# Patient Record
Sex: FEMALE | Race: WHITE | NOT HISPANIC OR LATINO | Employment: OTHER | URBAN - METROPOLITAN AREA
[De-identification: names, ages, dates, MRNs, and addresses within clinical notes are randomized per-mention and may not be internally consistent; named-entity substitution may affect disease eponyms.]

---

## 2017-01-12 DIAGNOSIS — F41.1 GAD (GENERALIZED ANXIETY DISORDER): Primary | ICD-10-CM

## 2017-01-12 NOTE — TELEPHONE ENCOUNTER
Xanax 1mg      Last Written Prescription Date:  07/22/16  Last Fill Quantity: 20,   # refills: 5  Last Office Visit with [facility] prescribing provider: 11/02/16  Future Office visit:       Routing refill request to provider for review/approval because:  Drug not on the [facility] refill protocol or controlled substance    Thank you -  Jairo Magallanes, Pharmacy Technician  [facility]  On Behalf Of [facility]

## 2017-01-13 RX ORDER — ALPRAZOLAM 1 MG
1 TABLET ORAL PRN
Qty: 20 TABLET | Refills: 5 | Status: SHIPPED | OUTPATIENT
Start: 2017-01-13 | End: 2017-06-29

## 2017-06-29 DIAGNOSIS — F41.1 GAD (GENERALIZED ANXIETY DISORDER): ICD-10-CM

## 2017-06-30 RX ORDER — ALPRAZOLAM 1 MG
1 TABLET ORAL PRN
Qty: 20 TABLET | Refills: 5 | Status: SHIPPED | OUTPATIENT
Start: 2017-06-30 | End: 2017-11-21

## 2017-06-30 NOTE — TELEPHONE ENCOUNTER
Script walked over to the [facility].    Christianne Kline, [facility]

## 2017-07-21 ENCOUNTER — OFFICE VISIT (OUTPATIENT)
Dept: FAMILY MEDICINE | Facility: CLINIC | Age: 47
End: 2017-07-21
Payer: COMMERCIAL

## 2017-07-21 VITALS
WEIGHT: 159.6 LBS | HEART RATE: 89 BPM | HEIGHT: 64 IN | SYSTOLIC BLOOD PRESSURE: 155 MMHG | DIASTOLIC BLOOD PRESSURE: 95 MMHG | TEMPERATURE: 98.6 F | BODY MASS INDEX: 27.25 KG/M2

## 2017-07-21 DIAGNOSIS — D50.0 IRON DEFICIENCY ANEMIA DUE TO CHRONIC BLOOD LOSS: ICD-10-CM

## 2017-07-21 DIAGNOSIS — F41.1 GENERALIZED ANXIETY DISORDER: Primary | ICD-10-CM

## 2017-07-21 DIAGNOSIS — R03.0 ELEVATED BLOOD PRESSURE READING WITHOUT DIAGNOSIS OF HYPERTENSION: ICD-10-CM

## 2017-07-21 DIAGNOSIS — L68.0 HIRSUTISM: ICD-10-CM

## 2017-07-21 LAB — HGB BLD-MCNC: 13.3 G/DL (ref 11.7–15.7)

## 2017-07-21 PROCEDURE — 85018 HEMOGLOBIN: CPT | Performed by: FAMILY MEDICINE

## 2017-07-21 PROCEDURE — 84443 ASSAY THYROID STIM HORMONE: CPT | Performed by: FAMILY MEDICINE

## 2017-07-21 PROCEDURE — 84403 ASSAY OF TOTAL TESTOSTERONE: CPT | Performed by: FAMILY MEDICINE

## 2017-07-21 PROCEDURE — 83498 ASY HYDROXYPROGESTERONE 17-D: CPT | Performed by: FAMILY MEDICINE

## 2017-07-21 PROCEDURE — 99214 OFFICE O/P EST MOD 30 MIN: CPT | Performed by: FAMILY MEDICINE

## 2017-07-21 PROCEDURE — 83001 ASSAY OF GONADOTROPIN (FSH): CPT | Performed by: FAMILY MEDICINE

## 2017-07-21 PROCEDURE — 36415 COLL VENOUS BLD VENIPUNCTURE: CPT | Performed by: FAMILY MEDICINE

## 2017-07-21 PROCEDURE — 82627 DEHYDROEPIANDROSTERONE: CPT | Performed by: FAMILY MEDICINE

## 2017-07-21 RX ORDER — DESVENLAFAXINE 25 MG/1
25 TABLET, EXTENDED RELEASE ORAL DAILY
Qty: 30 TABLET | Refills: 1 | Status: SHIPPED | OUTPATIENT
Start: 2017-07-21 | End: 2017-09-19

## 2017-07-21 ASSESSMENT — ANXIETY QUESTIONNAIRES
7. FEELING AFRAID AS IF SOMETHING AWFUL MIGHT HAPPEN: SEVERAL DAYS
1. FEELING NERVOUS, ANXIOUS, OR ON EDGE: SEVERAL DAYS
6. BECOMING EASILY ANNOYED OR IRRITABLE: SEVERAL DAYS
5. BEING SO RESTLESS THAT IT IS HARD TO SIT STILL: NOT AT ALL
3. WORRYING TOO MUCH ABOUT DIFFERENT THINGS: SEVERAL DAYS
GAD7 TOTAL SCORE: 6
2. NOT BEING ABLE TO STOP OR CONTROL WORRYING: SEVERAL DAYS

## 2017-07-21 ASSESSMENT — PATIENT HEALTH QUESTIONNAIRE - PHQ9: 5. POOR APPETITE OR OVEREATING: SEVERAL DAYS

## 2017-07-21 NOTE — NURSING NOTE
"Initial BP (!) 156/95  Pulse 97  Temp 98.6  F (37  C) (Tympanic)  Ht 5' 4.25\" (1.632 m)  Wt 159 lb 9.6 oz (72.4 kg)  Breastfeeding? No  BMI 27.18 kg/m2 Estimated body mass index is 27.18 kg/(m^2) as calculated from the following:    Height as of this encounter: 5' 4.25\" (1.632 m).    Weight as of this encounter: 159 lb 9.6 oz (72.4 kg). .      "

## 2017-07-21 NOTE — PROGRESS NOTES
SUBJECTIVE:                                                    Tracee Cooper is a 47 year old female who presents to clinic today for the following health issues:        Depression and Anxiety Follow-Up    Status since last visit: Worsened for the last 6 months, stopped effexor 1 year ago due to elevated BP    Other associated symptoms:None    Complicating factors:     Significant life event: No     Current substance abuse: None    Dizzy Spells    Lately occurs for a few minutes.  Comes out of the blue.  No syncope but will see spots. Not when stressed    Working out fine without issues.     1. Generalized anxiety disorder    2. Elevated blood pressure reading without diagnosis of hypertension - was better off of venlafaxine...she thought. No symptoms.    3. Iron deficiency anemia due to chronic blood loss - thinks she is better but never rechecked.    4. Hirsutism - ongoing. Frustrating.  Has had hormone testing in past.  Gets electrolysis regularlyy and therapist doesn't think this is normal.         PHQ-9 SCORE 8/21/2014 2/3/2015 7/21/2017   Total Score 2 2 -   Total Score MyChart 2 - -   Total Score - - 4     ORVILLE-7 SCORE 2/3/2015 3/1/2016 7/21/2017   Total Score 3 - -   Total Score - - -   Total Score - 4 6       PHQ-9  English  PHQ-9   Any Language  GAD7        Problem list and histories reviewed & adjusted, as indicated.  Additional history: as documented        Reviewed and updated as needed this visit by clinical staffTobacco  Allergies  Meds  Med Hx  Surg Hx  Fam Hx  Soc Hx      Reviewed and updated as needed this visit by Provider         1. Generalized anxiety disorder    2. Elevated blood pressure reading without diagnosis of hypertension    3. Iron deficiency anemia due to chronic blood loss    4. Hirsutism        PMH: Updated and/or reviewed in chart.    PSH: Updated and/or reviewed in chart.    Family History: Updated and/or reviewed in chart.     ROS:  Constitutional, neuro, endocrine,  "gastrointestinal, genitourinary and psychiatric systems are otherwise negative.       OBJECTIVE:                                                    BP (!) 156/95  Pulse 97  Temp 98.6  F (37  C) (Tympanic)  Ht 5' 4.25\" (1.632 m)  Wt 159 lb 9.6 oz (72.4 kg)  Breastfeeding? No  BMI 27.18 kg/m2  GENERAL: Pleasant and interactive.  Alert and oriented x 3.  No acute distress.  PSYCH: Alert and oriented times 3; coherent speech, normal rate and volume, able to articulate logical thoughts, able to abstract reason, no tangential thoughts, no hallucinations or delusions  Her affect is normal.      Results for orders placed or performed during the hospital encounter of 11/03/16   Hemoglobin   Result Value Ref Range    Hemoglobin 11.2 (L) 11.7 - 15.7 g/dL      Lab Results   Component Value Date    TSH 2.20 03/01/2016          ASSESSMENT/PLAN:                                                        ICD-10-CM    1. Generalized anxiety disorder F41.1 desvenlafaxine succinate (PRISTIQ) 25 MG 24 hr tablet   2. Elevated blood pressure reading without diagnosis of hypertension R03.0 TSH   3. Iron deficiency anemia due to chronic blood loss D50.0 Hemoglobin   4. Hirsutism L68.0 DHEA sulfate     Testosterone, total     TSH     Follicle stimulating hormone     17 OH progesterone       Care plan updated in chart for chronic problems.    Patient Instructions     Follow-up with me in 3-4 weeks after starting the Pristiq.    Labs today.    Consider oral contraceptive for hair growth if all labs normal but may not help and could be associated with slight increase cancer and clotting issues.      No orders of the defined types were placed in this encounter.         See Patient Instructions    Mukesh Regalado MD        "

## 2017-07-21 NOTE — MR AVS SNAPSHOT
After Visit Summary   7/21/2017    Tracee Cooper    MRN: [id number]           Patient Information     Date Of Birth          1970        Visit Information        Provider Department      7/21/2017 1:30 PM Mukesh Regalado MD [facility]        Today's Diagnoses     Generalized anxiety disorder    -  1    Elevated blood pressure reading without diagnosis of hypertension        Iron deficiency anemia due to chronic blood loss        Hirsutism          Care Instructions      Follow-up with me in 3-4 weeks after starting the Pristiq.    Labs today.    Consider oral contraceptive for hair growth if all labs normal but may not help and could be associated with slight increase cancer and clotting issues.           Follow-ups after your visit        Who to contact     Normal or non-critical lab and imaging results will be communicated to you by [employer], letter or phone within 4 business days after the clinic has received the results. If you do not hear from us within 7 days, please contact the clinic through [employer] or phone. If you have a critical or abnormal lab result, we will notify you by phone as soon as possible.  Submit refill requests through [employer] or call your pharmacy and they will forward the refill request to us. Please allow 3 business days for your refill to be completed.          If you need to speak with a  for additional information , please call: [phone]           Additional Information About Your Visit        [employer] Information     [employer] gives you secure access to your electronic health record. If you see a primary care provider, you can also send messages to your care team and make appointments. If you have questions, please call your primary care clinic.  If you do not have a primary care provider, please call [phone] and they will assist you.        Care EveryWhere ID     This is your Care EveryWhere ID. This could be used by  "other organizations to access your [place] medical records  [id number]        Your Vitals Were     Pulse Temperature Height Breastfeeding? BMI (Body Mass Index)       97 98.6  F (37  C) (Tympanic) 5' 4.25\" (1.632 m) No 27.18 kg/m2        Blood Pressure from Last 3 Encounters:   07/21/17 (!) 156/95   11/03/16 128/87   11/02/16 124/80    Weight from Last 3 Encounters:   07/21/17 159 lb 9.6 oz (72.4 kg)   11/03/16 160 lb (72.6 kg)   11/02/16 162 lb (73.5 kg)              We Performed the Following     17 OH progesterone     DHEA sulfate     Follicle stimulating hormone     Hemoglobin     Testosterone, total     TSH          Today's Medication Changes          These changes are accurate as of: 7/21/17  2:10 PM.  If you have any questions, ask your nurse or doctor.               Start taking these medicines.        Dose/Directions    desvenlafaxine succinate 25 MG 24 hr tablet   Commonly known as:  PRISTIQ   Used for:  Generalized anxiety disorder   Started by:  Mukesh Regalado MD        Dose:  25 mg   Take 1 tablet (25 mg) by mouth daily   Quantity:  30 tablet   Refills:  1            Where to get your medicines      These medications were sent to [facility] [address]  [address] [address]     Phone:  [phone]     desvenlafaxine succinate 25 MG 24 hr tablet                Primary Care Provider Office Phone # Fax #    Mukesh Regalado [phone]       [facility] [address] [facility] [address]        Equal Access to Services     [facility] AH: Hadii aad ku hadasho Soomaali, waaxda luqadaha, qaybta kaalmada adeegyada, waxay renitain hayaan moustapha steward. So [facility] [phone].    ATENCIÓN: Si habla español, tiene a sexton disposición servicios gratuitos de asistencia lingüística. Llame al [phone].    We comply with applicable federal civil rights laws and [place] laws. We do not discriminate on the " basis of race, color, national origin, age, disability sex, sexual orientation or gender identity.            Thank you!     Thank you for choosing [facility]  for your care. Our goal is always to provide you with excellent care. Hearing back from our patients is one way we can continue to improve our services. Please take a few minutes to complete the written survey that you may receive in the mail after your visit with us. Thank you!             Your Updated Medication List - Protect others around you: Learn how to safely use, store and throw away your medicines at www.disposemymeds.org.          This list is accurate as of: 7/21/17  2:10 PM.  Always use your most recent med list.                   Brand Name Dispense Instructions for use Diagnosis    ALPRAZolam 1 MG tablet    XANAX    20 tablet    Take 1 tablet (1 mg) by mouth as needed    ORVILLE (generalized anxiety disorder)       desvenlafaxine succinate 25 MG 24 hr tablet    PRISTIQ    30 tablet    Take 1 tablet (25 mg) by mouth daily    Generalized anxiety disorder

## 2017-07-21 NOTE — PATIENT INSTRUCTIONS
Follow-up with me in 3-4 weeks after starting the Pristiq.    Labs today.    Consider oral contraceptive for hair growth if all labs normal but may not help and could be associated with slight increase cancer and clotting issues.

## 2017-07-22 LAB
FSH SERPL-ACNC: 21.9 IU/L
TSH SERPL DL<=0.05 MIU/L-ACNC: 1.55 MU/L (ref 0.4–4)

## 2017-07-22 ASSESSMENT — ANXIETY QUESTIONNAIRES: GAD7 TOTAL SCORE: 6

## 2017-07-22 ASSESSMENT — PATIENT HEALTH QUESTIONNAIRE - PHQ9: SUM OF ALL RESPONSES TO PHQ QUESTIONS 1-9: 4

## 2017-07-24 LAB — DHEA-S SERPL-MCNC: 281 UG/DL (ref 35–430)

## 2017-07-25 LAB
17OHP SERPL-MCNC: 115 NG/DL
TESTOST SERPL-MCNC: 33 NG/DL (ref 8–60)

## 2017-07-31 NOTE — PROGRESS NOTES
MsRosi Cooper,    All of your results look great.  I can't come up with any specific medical causes for the hair growth. We may have to blame your parents?    Please contact the clinic if you have additional questions.  Thank you.    Sincerely,    Juve Regalado MD

## 2017-09-21 DIAGNOSIS — F41.1 GENERALIZED ANXIETY DISORDER: ICD-10-CM

## 2017-09-21 NOTE — TELEPHONE ENCOUNTER
MD sent RX for venlafaxine, but patient usually gets EFFEXOR (GERARDO 1) ..  Please okay new updated RX (to update EPIC) and pharmacy will override previous RX.

## 2017-11-21 ENCOUNTER — MYC MEDICAL ADVICE (OUTPATIENT)
Dept: FAMILY MEDICINE | Facility: CLINIC | Age: 47
End: 2017-11-21

## 2017-11-21 DIAGNOSIS — F41.1 GAD (GENERALIZED ANXIETY DISORDER): ICD-10-CM

## 2017-11-21 DIAGNOSIS — F41.1 GENERALIZED ANXIETY DISORDER: ICD-10-CM

## 2017-11-21 NOTE — TELEPHONE ENCOUNTER
Dr. Regalado:    Please see pt message below.  Refills pended.    PHQ-9 SCORE 8/21/2014 2/3/2015 7/21/2017   Total Score 2 2 -   Total Score MyChart 2 - -   Total Score - - 4     Sent new PHQ9 to pt via [employer].    Genevieve RIOS RN

## 2017-11-22 RX ORDER — ALPRAZOLAM 1 MG
1 TABLET ORAL PRN
Qty: 20 TABLET | Refills: 5 | Status: SHIPPED | OUTPATIENT
Start: 2017-11-22 | End: 2018-10-30

## 2017-11-22 NOTE — TELEPHONE ENCOUNTER
Script walked to [facility]. Patient notified via mychart.    Autumn [place]   [facility] [place]

## 2017-12-26 DIAGNOSIS — F41.1 GENERALIZED ANXIETY DISORDER: ICD-10-CM

## 2017-12-26 NOTE — TELEPHONE ENCOUNTER
Please send approval if filling a generic is okay, it will save patient a significant amount of money.       Venlafaxine      Last office visit:  07/21/17

## 2018-05-22 DIAGNOSIS — F41.1 GENERALIZED ANXIETY DISORDER: ICD-10-CM

## 2018-05-22 NOTE — TELEPHONE ENCOUNTER
"Requested Prescriptions   Pending Prescriptions Disp Refills     EFFEXOR XR 75 MG 24 hr capsule 90 capsule 1    Last Written Prescription Date:  01/09/2018 #90 x 1  Last filled 04/24/2018  Last office visit: 7/21/2017 OMAR Regalado   Future Office Visit: None    Note: Rx GERARDO, Requested GEQ     Sig: Take 1 capsule (75 mg) by mouth daily    Serotonin-Norepinephrine Reuptake Inhibitors  Failed    5/22/2018 10:58 AM       Failed - Blood pressure under 140/90 in past 12 months    BP Readings from Last 3 Encounters:   07/21/17 (!) 155/95   11/03/16 128/87   11/02/16 124/80                Failed - Normal serum creatinine on file in past 12 months    Recent Labs   Lab Test  06/29/16   1347   CR  0.67            Passed - Recent (12 mo) or future (30 days) visit within the authorizing provider's specialty    Patient had office visit in the last 12 months or has a visit in the next 30 days with authorizing provider or within the authorizing provider's specialty.  See \"Patient Info\" tab in inbasket, or \"Choose Columns\" in Meds & Orders section of the refill encounter.           Passed - Patient is age 18 or older       Passed - No active pregnancy on record       Passed - No positive pregnancy test in past 12 months          "

## 2018-05-23 RX ORDER — VENLAFAXINE HYDROCHLORIDE 75 MG/1
75 TABLET, EXTENDED RELEASE ORAL DAILY
Qty: 30 TABLET | Refills: 0 | Status: SHIPPED | OUTPATIENT
Start: 2018-05-23 | End: 2018-10-30

## 2018-05-23 NOTE — TELEPHONE ENCOUNTER
Routing refill request to provider for review/approval because:  Failed protocol also needs to get established with new provider. Candace Bernstein RN

## 2018-10-30 ENCOUNTER — OFFICE VISIT (OUTPATIENT)
Dept: FAMILY MEDICINE | Facility: CLINIC | Age: 48
End: 2018-10-30
Payer: COMMERCIAL

## 2018-10-30 VITALS
SYSTOLIC BLOOD PRESSURE: 160 MMHG | WEIGHT: 167.8 LBS | HEART RATE: 100 BPM | BODY MASS INDEX: 27.96 KG/M2 | TEMPERATURE: 98.5 F | DIASTOLIC BLOOD PRESSURE: 100 MMHG | HEIGHT: 65 IN

## 2018-10-30 DIAGNOSIS — F41.1 GENERALIZED ANXIETY DISORDER: ICD-10-CM

## 2018-10-30 DIAGNOSIS — I10 ESSENTIAL HYPERTENSION WITH GOAL BLOOD PRESSURE LESS THAN 140/90: Primary | ICD-10-CM

## 2018-10-30 DIAGNOSIS — Z12.31 ENCOUNTER FOR SCREENING MAMMOGRAM FOR BREAST CANCER: ICD-10-CM

## 2018-10-30 LAB
ANION GAP SERPL CALCULATED.3IONS-SCNC: 6 MMOL/L (ref 3–14)
BUN SERPL-MCNC: 11 MG/DL (ref 7–30)
CALCIUM SERPL-MCNC: 8.8 MG/DL (ref 8.5–10.1)
CHLORIDE SERPL-SCNC: 104 MMOL/L (ref 94–109)
CO2 SERPL-SCNC: 26 MMOL/L (ref 20–32)
CREAT SERPL-MCNC: 0.67 MG/DL (ref 0.52–1.04)
GFR SERPL CREATININE-BSD FRML MDRD: >90 ML/MIN/1.7M2
GLUCOSE SERPL-MCNC: 89 MG/DL (ref 70–99)
POTASSIUM SERPL-SCNC: 4.3 MMOL/L (ref 3.4–5.3)
SODIUM SERPL-SCNC: 136 MMOL/L (ref 133–144)

## 2018-10-30 PROCEDURE — 80048 BASIC METABOLIC PNL TOTAL CA: CPT | Performed by: FAMILY MEDICINE

## 2018-10-30 PROCEDURE — 99214 OFFICE O/P EST MOD 30 MIN: CPT | Performed by: FAMILY MEDICINE

## 2018-10-30 PROCEDURE — 36415 COLL VENOUS BLD VENIPUNCTURE: CPT | Performed by: FAMILY MEDICINE

## 2018-10-30 RX ORDER — ALPRAZOLAM 1 MG
1 TABLET ORAL 3 TIMES DAILY PRN
Qty: 20 TABLET | Refills: 0 | Status: SHIPPED | OUTPATIENT
Start: 2018-10-30 | End: 2019-01-03

## 2018-10-30 RX ORDER — HYDROCHLOROTHIAZIDE 12.5 MG/1
12.5 TABLET ORAL DAILY
Qty: 30 TABLET | Refills: 0 | Status: SHIPPED | OUTPATIENT
Start: 2018-10-30 | End: 2018-11-14 | Stop reason: DRUGHIGH

## 2018-10-30 RX ORDER — VENLAFAXINE HYDROCHLORIDE 75 MG/1
75 TABLET, EXTENDED RELEASE ORAL DAILY
Qty: 90 TABLET | Refills: 1 | Status: SHIPPED | OUTPATIENT
Start: 2018-10-30 | End: 2018-11-26

## 2018-10-30 ASSESSMENT — ANXIETY QUESTIONNAIRES
3. WORRYING TOO MUCH ABOUT DIFFERENT THINGS: SEVERAL DAYS
2. NOT BEING ABLE TO STOP OR CONTROL WORRYING: SEVERAL DAYS
5. BEING SO RESTLESS THAT IT IS HARD TO SIT STILL: NOT AT ALL
1. FEELING NERVOUS, ANXIOUS, OR ON EDGE: MORE THAN HALF THE DAYS
7. FEELING AFRAID AS IF SOMETHING AWFUL MIGHT HAPPEN: NOT AT ALL
6. BECOMING EASILY ANNOYED OR IRRITABLE: NOT AT ALL
GAD7 TOTAL SCORE: 5

## 2018-10-30 ASSESSMENT — PATIENT HEALTH QUESTIONNAIRE - PHQ9
5. POOR APPETITE OR OVEREATING: SEVERAL DAYS
SUM OF ALL RESPONSES TO PHQ QUESTIONS 1-9: 4

## 2018-10-30 NOTE — NURSING NOTE
"Initial BP (!) 160/100  Pulse 100  Temp 98.5  F (36.9  C) (Tympanic)  Ht 5' 4.75\" (1.645 m)  Wt 167 lb 12.8 oz (76.1 kg)  Breastfeeding? No  BMI 28.14 kg/m2 Estimated body mass index is 28.14 kg/(m^2) as calculated from the following:    Height as of this encounter: 5' 4.75\" (1.645 m).    Weight as of this encounter: 167 lb 12.8 oz (76.1 kg). .      "

## 2018-10-30 NOTE — PATIENT INSTRUCTIONS
I'll let you know the lab results from today when available.  We'll have you begin the hydrochlorothiazide for your blood pressure. This will be taken once daily in the morning.  We'll see you back for the blood pressure check and nonfasting blood work as scheduled    I refilled the venlafaxine for 6 months, we'll plan to update your questionnaires at that time.    I also refilled the alprazolam for 20 tablets.  Just let me know when you are due for a refill.    You can call [phone] to schedule your mammogram when you are ready.

## 2018-10-30 NOTE — PROGRESS NOTES
SUBJECTIVE:   Tracee Cooper is a 48 year old female who presents to clinic today for the following health issues:    Anxiety Follow-Up    Status since last visit: Worsened for the last 6 months    Other associated symptoms:None    Complicating factors:   Significant life event: Yes-  Work, family stress   Current substance abuse: None  Depression symptoms: Yes-    ORVILLE-7 SCORE 3/1/2016 7/21/2017 10/30/2018   Total Score - - -   Total Score - - -   Total Score 4 6 5       ORVILLE-7    Amount of exercise or physical activity: None    Problems taking medications regularly: No    Medication side effects: none    Diet: regular (no restrictions)    * bilateral ear pain, feels like there is fluid in ears    History of Present Illness:   Tracee has a long-standing history of generalized anxiety, managed with venlafaxine, alprazolam and psychotherapy in the past. She is here for medication refill and a 6 month history of worsening anxiety.    She has a long family history of anxiety. Recently, her daughter moved away for college. She feels a bit down about aging. Her anxiety makes it harder to accomplish daily tasks than it would be otherwise. She thinks she is handling it all well and that the medications allow her to cope.    She states that she would like to exercise more. She says exercise is 10/10 important to her because she knows it would help her health and anxiety. She says she is about 5/10 confident that she could begin exercising right away. The only obstacle she says is the motivation. Together, we set a goal of going to the gym 1 time per week to get started. She will use a girlfriend of hers for accountability.    She is concerned that she will develop hypertension again that will require anti-hypertensives. When she was off off of venlafaxine several years ago, her blood pressure was normal. She says staying on venlafaxine is important to her because she tried several other anxiety meds in the past and  it was the only one that worked well for her.    She uses alprazolam sparingly, perhaps a few times per month for increased anxiety.  Has now been out of that med for a number of weeks.    Problem list and histories reviewed & adjusted, as indicated.  Additional history: none    Patient Active Problem List   Diagnosis     Melanoma (H)     Anemia     Hirsutism     CARDIOVASCULAR SCREENING; LDL GOAL LESS THAN 160     Menorrhagia     Generalized anxiety disorder     Elevated blood pressure reading without diagnosis of hypertension     Chronic diarrhea     Excessive bleeding in premenopausal period     Past Surgical History:   Procedure Laterality Date     C/SECTION, LOW TRANSVERSE      , Low Transverse     D & C  3/8/06    Missed AB at 7 weeks     DILATION AND CURETTAGE, HYSTEROSCOPY, ABLATE ENDOMETRIUM, COMBINED N/A 11/3/2016    Procedure: COMBINED DILATION AND CURETTAGE, HYSTEROSCOPY, ABLATE ENDOMETRIUM;  Surgeon: Jane Marquez MD;  Location: [facility]     HYSTEROSCOPY  2006    F/U US Abnormalit s/p d&c     SURGICAL HISTORY OF -   2002    Malignant melanoma of the right arm.       Social History   Substance Use Topics     Smoking status: Former Smoker     Types: Cigarettes     Quit date: 2006     Smokeless tobacco: Never Used     Alcohol use Yes      Comment: occasionally     Family History   Problem Relation Age of Onset     Prostate Cancer Father      Thyroid Disease Mother      Hypothyroid     Hypertension Mother      C.A.D. Maternal Grandfather      Cerebrovascular Disease Maternal Grandmother      Breast Cancer Maternal Aunt      Diabetes Paternal Grandmother      adult onset     Cancer Paternal Grandfather      stomach     Cancer - colorectal No family hx of          Current Outpatient Prescriptions   Medication Sig Dispense Refill     ALPRAZolam (XANAX) 1 MG tablet Take 1 tablet (1 mg) by mouth 3 times daily as needed for anxiety 20 tablet 0     hydrochlorothiazide 12.5 MG  "TABS tablet Take 1 tablet (12.5 mg) by mouth daily 30 tablet 0     venlafaxine (EFFEXOR-ER) 75 MG TB24 24 hr tablet Take 1 tablet (75 mg) by mouth daily 90 tablet 1     No Known Allergies  Labs reviewed in EPIC    Reviewed and updated as needed this visit by clinical staff  Tobacco  Allergies  Meds  Med Hx  Surg Hx  Fam Hx  Soc Hx      Reviewed and updated as needed this visit by Provider  Tobacco  Med Hx  Surg Hx  Fam Hx  Soc Hx      ROS:  Constitutional, HEENT, cardiovascular, pulmonary, gi and gu systems are negative, except as otherwise noted.  Diarrhea 4x per week; intermittent bloating and cramping resolved by defecation    OBJECTIVE:     BP (!) 160/100  Pulse 100  Temp 98.5  F (36.9  C) (Tympanic)  Ht 5' 4.75\" (1.645 m)  Wt 167 lb 12.8 oz (76.1 kg)  Breastfeeding? No  BMI 28.14 kg/m2  Body mass index is 28.14 kg/(m^2).  GENERAL: healthy, alert and no distress  RESP: lungs clear to auscultation - no rales, rhonchi or wheezes  CV: regular rate and rhythm, normal S1 S2, no S3 or S4, no murmur, click or rub, no peripheral edema and peripheral pulses strong  MS: no gross musculoskeletal defects noted, no edema  Psych: Alert and oriented times 3; coherent speech, normal   rate and volume, able to articulate logical thoughts, able   to abstract reason, no tangential thoughts, no hallucinations   or delusions  Her affect is mildly anxious      Diagnostic Test Results:  none     ASSESSMENT/PLAN:     Tracee has hypertension, possibly related to venlafaxine or superimposed on essential hypertension.  Counseling was provided on the risks of alprazolam and to keep to less than 20 tablets every few months. Will check on use at next visit. Would also like to begin psychotherapy, but not open at this time    Reviewed options to switch to serotonin specific reuptake inhibitor but pt reports having failed all those meds in the past and is not interested in making a change.      ICD-10-CM    1. Essential " hypertension with goal blood pressure less than 140/90 I10 hydrochlorothiazide 12.5 MG TABS tablet     Basic metabolic panel     Basic metabolic panel   2. Generalized anxiety disorder F41.1 venlafaxine (EFFEXOR-ER) 75 MG TB24 24 hr tablet   3. Encounter for screening mammogram for breast cancer Z12.31 MA Screening Digital Bilateral       # Anxiety  - Continue venlafaxine ER 75mg PO q day  - Continue alprazolam 1 mg PRN  - Encouraged exercise    # Hypertension  - begin hydrochlorothiazide 12.5mg PO q day  - recheck 2 weeks    # Health maintenance  - Influenza vaccine declined this visit  - Ordered routine mammography, will schedule when able    Patient Instructions   I'll let you know the lab results from today when available.  We'll have you begin the hydrochlorothiazide for your blood pressure. This will be taken once daily in the morning.  We'll see you back for the blood pressure check and nonfasting blood work as scheduled    I refilled the venlafaxine for 6 months, we'll plan to update your questionnaires at that time.    I also refilled the alprazolam for 20 tablets.  Just let me know when you are due for a refill.    You can call [phone] to schedule your mammogram when you are ready.        Louise Agrawal, DO  [facility]

## 2018-10-30 NOTE — MR AVS SNAPSHOT
After Visit Summary   10/30/2018    Tracee Cooper    MRN: [id number]           Patient Information     Date Of Birth          1970        Visit Information        Provider Department      10/30/2018 11:40 AM Louise Agrawal DO [facility]        Today's Diagnoses     Essential hypertension with goal blood pressure less than 140/90    -  1    ORVILLE (generalized anxiety disorder)        Generalized anxiety disorder        Encounter for screening mammogram for breast cancer          Care Instructions    I'll let you know the lab results from today when available.  We'll have you begin the hydrochlorothiazide for your blood pressure. This will be taken once daily in the morning.  We'll see you back for the blood pressure check and nonfasting blood work as scheduled    I refilled the venlafaxine for 6 months, we'll plan to update your questionnaires at that time.    I also refilled the alprazolam for 20 tablets.  Just let me know when you are due for a refill.    You can call [phone] to schedule your mammogram when you are ready.          Follow-ups after your visit        Your next 10 appointments already scheduled     Nov 13, 2018 10:30 AM CST   Nurse Only with Fl Ll Cma/Lpn   [facility]    [address] [facility] [address] [phone]            Nov 13, 2018 10:45 AM CST   LAB with  LAB   [facility]    [address] [address] [phone]           Please do not eat 10-12 hours before your appointment if you are coming in fasting for labs on lipids, cholesterol, or glucose (sugar). This does not apply to pregnant women. Water, hot tea and black coffee (with nothing added) are okay. Do not drink other fluids, diet soda or chew gum.              Future tests that were ordered for you today     Open Future Orders        Priority Expected Expires  "Ordered    MA Screening Digital Bilateral Routine  10/30/2019 10/30/2018    Basic metabolic panel Routine 11/13/2018 10/30/2019 10/30/2018            Who to contact     Normal or non-critical lab and imaging results will be communicated to you by Chicoryhart, letter or phone within 4 business days after the clinic has received the results. If you do not hear from us within 7 days, please contact the clinic through SPD Control Systemst or phone. If you have a critical or abnormal lab result, we will notify you by phone as soon as possible.  Submit refill requests through Pockethernet or call your pharmacy and they will forward the refill request to us. Please allow 3 business days for your refill to be completed.          If you need to speak with a  for additional information , please call: [phone]           Additional Information About Your Visit        Pockethernet Information     Pockethernet gives you secure access to your electronic health record. If you see a primary care provider, you can also send messages to your care team and make appointments. If you have questions, please call your primary care clinic.  If you do not have a primary care provider, please call [phone] and they will assist you.        Care EveryWhere ID     This is your Care EveryWhere ID. This could be used by other organizations to access your [facility] medical records  [id number]        Your Vitals Were     Pulse Temperature Height Breastfeeding? BMI (Body Mass Index)       100 98.5  F (36.9  C) (Tympanic) 5' 4.75\" (1.645 m) No 28.14 kg/m2        Blood Pressure from Last 3 Encounters:   10/30/18 (!) 160/100   07/21/17 (!) 155/95   11/03/16 128/87    Weight from Last 3 Encounters:   10/30/18 167 lb 12.8 oz (76.1 kg)   07/21/17 159 lb 9.6 oz (72.4 kg)   11/03/16 160 lb (72.6 kg)              We Performed the Following     Basic metabolic panel          Today's Medication Changes          These changes are accurate as of 10/30/18 12:56 PM.  If " you have any questions, ask your nurse or doctor.               Start taking these medicines.        Dose/Directions    hydrochlorothiazide 12.5 MG Tabs tablet   Used for:  Essential hypertension with goal blood pressure less than 140/90   Started by:  Louise Agrawal DO        Dose:  12.5 mg   Take 1 tablet (12.5 mg) by mouth daily   Quantity:  30 tablet   Refills:  0         These medicines have changed or have updated prescriptions.        Dose/Directions    ALPRAZolam 1 MG tablet   Commonly known as:  XANAX   This may have changed:    - when to take this  - reasons to take this   Used for:  ORVILLE (generalized anxiety disorder)   Changed by:  Louise Agrawal DO        Dose:  1 mg   Take 1 tablet (1 mg) by mouth 3 times daily as needed for anxiety   Quantity:  20 tablet   Refills:  0            Where to get your medicines      These medications were sent to [facility] [address] [address]  AT [address], [facility] [address]     Phone:  [phone]     hydrochlorothiazide 12.5 MG Tabs tablet    venlafaxine 75 MG Tb24 24 hr tablet         Some of these will need a paper prescription and others can be bought over the counter.  Ask your nurse if you have questions.     Bring a paper prescription for each of these medications     ALPRAZolam 1 MG tablet                Primary Care Provider Office Phone # Fax #    [facility] [phone] [address] [address]        Equal Access to Services     SANDRA SALGADO AH: Hadii terrie mccarthyo Soleelee, waaxda luqadaha, qaybta kaalmada adeegyada, maria d steward. So [facility] [phone].    ATENCIÓN: Si habla español, tiene a sexton disposición servicios gratuitos de asistencia lingüística. Llame al [phone].    We comply with applicable federal civil rights laws and [place] laws. We do not discriminate on the basis of race, color,  national origin, age, disability, sex, sexual orientation, or gender identity.            Thank you!     Thank you for choosing [facility]  for your care. Our goal is always to provide you with excellent care. Hearing back from our patients is one way we can continue to improve our services. Please take a few minutes to complete the written survey that you may receive in the mail after your visit with us. Thank you!             Your Updated Medication List - Protect others around you: Learn how to safely use, store and throw away your medicines at www.disposemymeds.org.          This list is accurate as of 10/30/18 12:56 PM.  Always use your most recent med list.                   Brand Name Dispense Instructions for use Diagnosis    ALPRAZolam 1 MG tablet    XANAX    20 tablet    Take 1 tablet (1 mg) by mouth 3 times daily as needed for anxiety    ORVILLE (generalized anxiety disorder)       hydrochlorothiazide 12.5 MG Tabs tablet     30 tablet    Take 1 tablet (12.5 mg) by mouth daily    Essential hypertension with goal blood pressure less than 140/90       venlafaxine 75 MG Tb24 24 hr tablet    EFFEXOR-ER    90 tablet    Take 1 tablet (75 mg) by mouth daily    Generalized anxiety disorder

## 2018-10-31 ASSESSMENT — ANXIETY QUESTIONNAIRES: GAD7 TOTAL SCORE: 5

## 2018-11-13 ENCOUNTER — ALLIED HEALTH/NURSE VISIT (OUTPATIENT)
Dept: FAMILY MEDICINE | Facility: CLINIC | Age: 48
End: 2018-11-13
Payer: COMMERCIAL

## 2018-11-13 VITALS — DIASTOLIC BLOOD PRESSURE: 92 MMHG | HEART RATE: 80 BPM | SYSTOLIC BLOOD PRESSURE: 162 MMHG

## 2018-11-13 DIAGNOSIS — Z01.30 BP CHECK: Primary | ICD-10-CM

## 2018-11-13 DIAGNOSIS — I10 ESSENTIAL HYPERTENSION WITH GOAL BLOOD PRESSURE LESS THAN 140/90: ICD-10-CM

## 2018-11-13 LAB
ANION GAP SERPL CALCULATED.3IONS-SCNC: 6 MMOL/L (ref 3–14)
BUN SERPL-MCNC: 12 MG/DL (ref 7–30)
CALCIUM SERPL-MCNC: 9.2 MG/DL (ref 8.5–10.1)
CHLORIDE SERPL-SCNC: 100 MMOL/L (ref 94–109)
CO2 SERPL-SCNC: 28 MMOL/L (ref 20–32)
CREAT SERPL-MCNC: 0.73 MG/DL (ref 0.52–1.04)
GFR SERPL CREATININE-BSD FRML MDRD: 84 ML/MIN/1.7M2
GLUCOSE SERPL-MCNC: 94 MG/DL (ref 70–99)
POTASSIUM SERPL-SCNC: 3.8 MMOL/L (ref 3.4–5.3)
SODIUM SERPL-SCNC: 134 MMOL/L (ref 133–144)

## 2018-11-13 PROCEDURE — 99207 ZZC NO CHARGE NURSE ONLY: CPT

## 2018-11-13 PROCEDURE — 36415 COLL VENOUS BLD VENIPUNCTURE: CPT | Performed by: FAMILY MEDICINE

## 2018-11-13 PROCEDURE — 80048 BASIC METABOLIC PNL TOTAL CA: CPT | Performed by: FAMILY MEDICINE

## 2018-11-13 NOTE — PROGRESS NOTES
SUBJECTIVE:  Tracee Cooper is a 48 year old female who presents for a follow up evaluation of her hypertension.    The reason for the visit is:  a recent medication change    Patient is taking medication as prescribed  Patient is tolerating medications well.  Patient is not monitoring Blood Pressure at home.      Current complaints: none      Current Outpatient Prescriptions   Medication     ALPRAZolam (XANAX) 1 MG tablet     hydrochlorothiazide 12.5 MG TABS tablet     venlafaxine (EFFEXOR-ER) 75 MG TB24 24 hr tablet     No current facility-administered medications for this visit.        No Known Allergies      OBJECTIVE:  Please get a blood pressure AND a pulse.  A height is also needed if has not been done in the past year.    BP (!) 162/92 (BP Location: Left arm, Patient Position: Sitting, Cuff Size: Adult Regular)  Pulse 80     Initial blood pressure before resting approximately 10 minutes was 166/100.    Vitals as recorded, a regular cuff was used.    ASSESSMENT:    Is the HYPERTENSION goal on the problem list? No  Patient Active Problem List   Diagnosis     Melanoma (H)     Anemia     Hirsutism     CARDIOVASCULAR SCREENING; LDL GOAL LESS THAN 160     Menorrhagia     Generalized anxiety disorder     Elevated blood pressure reading without diagnosis of hypertension     Chronic diarrhea     Excessive bleeding in premenopausal period       Plan:  The patient's blood pressure is less than documented goal. The patient will be discharged home. CC: this note to the patient's primary provider.     The patient s blood pressure is higher than goal but is less than 180 systolically AND less that 110 diastolically. The patient will be discharged home.  A telephone encounter will be created with this note and sent to the patient's primary provider for action.     The patient's blood pressure is above 180 systolically OR is above 110 diastolically. The primary provider, RN, or an available provider will be consulted for  immediate action. Keep the patient here for appropriate urgent action.

## 2018-11-13 NOTE — MR AVS SNAPSHOT
After Visit Summary   11/13/2018    Tracee Cooper    MRN: [id number]           Patient Information     Date Of Birth          1970        Visit Information        Provider Department      11/13/2018 10:30 AM Mayra/Lpn, Fl [facility]        Today's Diagnoses     BP check    -  1       Follow-ups after your visit        Your next 10 appointments already scheduled     Nov 13, 2018 10:45 AM CST   LAB with LL LAB   [facility]    [address] [address]   [phone]           Please do not eat 10-12 hours before your appointment if you are coming in fasting for labs on lipids, cholesterol, or glucose (sugar). This does not apply to pregnant women. Water, hot tea and black coffee (with nothing added) are okay. Do not drink other fluids, diet soda or chew gum.            Nov 15, 2018 10:30 AM CST   MA SCREENING DIGITAL BILATERAL with LLMA1   [facility]    [address] [address]   [phone]           How do I prepare for my exam? (Food and drink instructions) No Food and Drink Restrictions.  How do I prepare for my exam? (Other instructions) Do not use any powder, lotion or deodorant under your arms or on your breast. If you do, we will ask you to remove it before your exam.  What should I wear: Wear comfortable, two-piece clothing.  How long does the exam take: Most scans will take 15 minutes.  What should I bring: Bring any previous mammograms from other facilities or have them mailed to the breast center.  Do I need a :  No  is needed.  What do I need to tell my doctor: If you have any allergies, tell your care team.  What should I do after the exam: No restrictions, You may resume normal activities.  What is this test: This test is an x-ray of the breast to look for breast disease. The breast is pressed between two plates to  "flatten and spread the tissue. An X-ray is taken of the breast from different angles.  Who should I call with questions: If you have any questions, please call the Imaging Department where you will have your exam. Directions, parking instructions, and other information is available on our website, [email or web address].  Other information about my exam Three-dimensional (3D) mammograms are available at [place] locations in [facility], [place], [place], [facility], [place], [facility] and [place]. [facility] locations include [facility] and [facility] in [place].  Benefits of 3D mammograms include * Improved rate of cancer detection * Decreases your chance of having to go back for more tests, which means fewer: * \"False-positive\" results (This means that there is an abnormal area but it isn't cancer.) * Invasive testing procedures, such as a biopsy or surgery * Can provide clearer images of the breast if you have dense breast tissue.  *3D mammography is an optional exam that anyone can have with a 2D mammogram. It doesn't replace or take the place of a 2D mammogram. 2D mammograms remain an effective screening test for all women.  Not all insurance companies cover the cost of a 3D mammogram. Check with your insurance.              Who to contact     Normal or non-critical lab and imaging results will be communicated to you by MyChart, letter or phone within 4 business days after the clinic has received the results. If you do not hear from us within 7 days, please contact the clinic through MyChart or phone. If you have a critical or abnormal lab result, we will notify you by phone as soon as possible.  Submit refill requests through Plastyc or call your pharmacy and they will forward the refill request to us. Please allow 3 business days for your refill to be completed.          If you need to speak with a  for additional information , please call: " [phone]           Additional Information About Your Visit        ThetaRayhart Information     [employer] gives you secure access to your electronic health record. If you see a primary care provider, you can also send messages to your care team and make appointments. If you have questions, please call your primary care clinic.  If you do not have a primary care provider, please call [phone] and they will assist you.        Care EveryWhere ID     This is your Care EveryWhere ID. This could be used by other organizations to access your [facility] medical records  [id number]        Your Vitals Were     Pulse                   80            Blood Pressure from Last 3 Encounters:   11/13/18 (!) 162/92   10/30/18 (!) 160/100   07/21/17 (!) 155/95    Weight from Last 3 Encounters:   10/30/18 167 lb 12.8 oz (76.1 kg)   07/21/17 159 lb 9.6 oz (72.4 kg)   11/03/16 160 lb (72.6 kg)              Today, you had the following     No orders found for display       Primary Care Provider Office Phone # Fax #    [facility] at [phone] [address] [address]        Equal Access to Services     SANDRA SALGADO AH: Hadii terrie grijalva hadjacko Soomaali, waaxda luqadaha, qaybta kaalmada adeegyada, maria d steward. So [facility] [phone].    ATENCIÓN: Si habla español, tiene a sexton disposición servicios gratuitos de asistencia lingüística. Llame al [phone].    We comply with applicable federal civil rights laws and [place] laws. We do not discriminate on the basis of race, color, national origin, age, disability, sex, sexual orientation, or gender identity.            Thank you!     Thank you for choosing [facility]  for your care. Our goal is always to provide you with excellent care. Hearing back from our patients is one way we can continue to improve our services. Please take a few minutes to complete the written survey that you may receive in the  mail after your visit with us. Thank you!             Your Updated Medication List - Protect others around you: Learn how to safely use, store and throw away your medicines at www.disposemymeds.org.          This list is accurate as of 11/13/18 10:43 AM.  Always use your most recent med list.                   Brand Name Dispense Instructions for use Diagnosis    ALPRAZolam 1 MG tablet    XANAX    20 tablet    Take 1 tablet (1 mg) by mouth 3 times daily as needed for anxiety        hydrochlorothiazide 12.5 MG Tabs tablet     30 tablet    Take 1 tablet (12.5 mg) by mouth daily    Essential hypertension with goal blood pressure less than 140/90       venlafaxine 75 MG Tb24 24 hr tablet    EFFEXOR-ER    90 tablet    Take 1 tablet (75 mg) by mouth daily    Generalized anxiety disorder

## 2018-11-14 ENCOUNTER — MYC MEDICAL ADVICE (OUTPATIENT)
Dept: FAMILY MEDICINE | Facility: CLINIC | Age: 48
End: 2018-11-14

## 2018-11-14 DIAGNOSIS — I10 ESSENTIAL HYPERTENSION WITH GOAL BLOOD PRESSURE LESS THAN 140/90: Primary | ICD-10-CM

## 2018-11-14 RX ORDER — HYDROCHLOROTHIAZIDE 25 MG/1
25 TABLET ORAL DAILY
Qty: 30 TABLET | Refills: 0 | Status: SHIPPED | OUTPATIENT
Start: 2018-11-14 | End: 2018-12-14

## 2018-11-15 ENCOUNTER — RADIANT APPOINTMENT (OUTPATIENT)
Dept: MAMMOGRAPHY | Facility: CLINIC | Age: 48
End: 2018-11-15
Attending: FAMILY MEDICINE
Payer: COMMERCIAL

## 2018-11-15 DIAGNOSIS — Z12.31 ENCOUNTER FOR SCREENING MAMMOGRAM FOR BREAST CANCER: ICD-10-CM

## 2018-11-15 PROCEDURE — 77067 SCR MAMMO BI INCL CAD: CPT | Mod: TC

## 2018-11-26 DIAGNOSIS — F41.1 GENERALIZED ANXIETY DISORDER: ICD-10-CM

## 2018-11-26 NOTE — TELEPHONE ENCOUNTER
"Requested Prescriptions   Pending Prescriptions Disp Refills     venlafaxine (EFFEXOR-ER) 75 MG 24 hr tablet 90 tablet 1    Last Written Prescription Date:  10/30/2018 #90 x 1  Last filled 08/24/2018  Last office visit: 10/30/2018 OMAR Agrawal   Future Office Visit: None     Sig: Take 1 tablet (75 mg) by mouth daily    Serotonin-Norepinephrine Reuptake Inhibitors  Failed    11/26/2018  3:23 PM       Failed - Blood pressure under 140/90 in past 12 months    BP Readings from Last 3 Encounters:   11/13/18 (!) 162/92   10/30/18 (!) 160/100   07/21/17 (!) 155/95                Passed - Recent (12 mo) or future (30 days) visit within the authorizing provider's specialty    Patient had office visit in the last 12 months or has a visit in the next 30 days with authorizing provider or within the authorizing provider's specialty.  See \"Patient Info\" tab in inbasket, or \"Choose Columns\" in Meds & Orders section of the refill encounter.             Passed - Patient is age 18 or older       Passed - No active pregnancy on record       Passed - Normal serum creatinine on file in past 12 months    Recent Labs   Lab Test  11/13/18   1027   CR  0.73            Passed - No positive pregnancy test in past 12 months          "

## 2018-11-27 RX ORDER — VENLAFAXINE HYDROCHLORIDE 75 MG/1
75 TABLET, EXTENDED RELEASE ORAL DAILY
Qty: 90 TABLET | Refills: 1 | Status: SHIPPED | OUTPATIENT
Start: 2018-11-27 | End: 2019-01-14 | Stop reason: DRUGHIGH

## 2018-11-27 NOTE — TELEPHONE ENCOUNTER
Routing refill request to provider for review/approval because:  Blood pressures out of protocol. Candace Bernstein RN

## 2018-12-13 ENCOUNTER — TELEPHONE (OUTPATIENT)
Dept: FAMILY MEDICINE | Facility: CLINIC | Age: 48
End: 2018-12-13

## 2018-12-13 ENCOUNTER — ALLIED HEALTH/NURSE VISIT (OUTPATIENT)
Dept: FAMILY MEDICINE | Facility: CLINIC | Age: 48
End: 2018-12-13
Payer: COMMERCIAL

## 2018-12-13 VITALS — HEART RATE: 98 BPM | DIASTOLIC BLOOD PRESSURE: 100 MMHG | SYSTOLIC BLOOD PRESSURE: 154 MMHG

## 2018-12-13 DIAGNOSIS — Z01.30 BP CHECK: Primary | ICD-10-CM

## 2018-12-13 DIAGNOSIS — I10 ESSENTIAL HYPERTENSION WITH GOAL BLOOD PRESSURE LESS THAN 140/90: ICD-10-CM

## 2018-12-13 LAB
ANION GAP SERPL CALCULATED.3IONS-SCNC: 8 MMOL/L (ref 3–14)
BUN SERPL-MCNC: 14 MG/DL (ref 7–30)
CALCIUM SERPL-MCNC: 9.8 MG/DL (ref 8.5–10.1)
CHLORIDE SERPL-SCNC: 101 MMOL/L (ref 94–109)
CO2 SERPL-SCNC: 28 MMOL/L (ref 20–32)
CREAT SERPL-MCNC: 0.73 MG/DL (ref 0.52–1.04)
GFR SERPL CREATININE-BSD FRML MDRD: 84 ML/MIN/1.7M2
GLUCOSE SERPL-MCNC: 97 MG/DL (ref 70–99)
POTASSIUM SERPL-SCNC: 3.8 MMOL/L (ref 3.4–5.3)
SODIUM SERPL-SCNC: 137 MMOL/L (ref 133–144)

## 2018-12-13 PROCEDURE — 36415 COLL VENOUS BLD VENIPUNCTURE: CPT | Performed by: FAMILY MEDICINE

## 2018-12-13 PROCEDURE — 80048 BASIC METABOLIC PNL TOTAL CA: CPT | Performed by: FAMILY MEDICINE

## 2018-12-13 PROCEDURE — 99207 ZZC NO CHARGE NURSE ONLY: CPT

## 2018-12-13 NOTE — NURSING NOTE
"Initial BP (!) 154/100 (BP Location: Left arm, Cuff Size: Adult Regular)   Pulse 98  Estimated body mass index is 28.14 kg/m  as calculated from the following:    Height as of 10/30/18: 1.645 m (5' 4.75\").    Weight as of 10/30/18: 76.1 kg (167 lb 12.8 oz). .    "

## 2018-12-13 NOTE — TELEPHONE ENCOUNTER
Patient in clinic for BP check.154/100.  requesting a refill of Hydrochlorothiazide 25mg.  She wanted to inform you that since starting the new dose she has not had any diarrhea.  She would like to continue with this dosage.  She also states that she is willing to stop Effexor and try a new medication if this is causing her hypertension.

## 2018-12-13 NOTE — PROGRESS NOTES
SUBJECTIVE:  Tracee Cooper is a 48 year old female who presents for a follow up evaluation of her hypertension.    The reason for the visit is:  a recent medication change    Patient is taking medication as prescribed  Patient is tolerating medications well.  Patient is monitoring Blood Pressure at home.  Average readings if yes are 140's-150's/90's    Current complaints: none      Current Outpatient Medications   Medication     ALPRAZolam (XANAX) 1 MG tablet     hydrochlorothiazide (HYDRODIURIL) 25 MG tablet     venlafaxine (EFFEXOR-ER) 75 MG 24 hr tablet     No current facility-administered medications for this visit.        No Known Allergies      OBJECTIVE:  Please get a blood pressure AND a pulse.  A height is also needed if has not been done in the past year.    There were no vitals taken for this visit.    Vitals as recorded, a regular cuff was used.    ASSESSMENT:    Is the HYPERTENSION goal on the problem list? no  Patient Active Problem List   Diagnosis     Melanoma (H)     Anemia     Hirsutism     CARDIOVASCULAR SCREENING; LDL GOAL LESS THAN 160     Menorrhagia     Generalized anxiety disorder     Elevated blood pressure reading without diagnosis of hypertension     Chronic diarrhea     Excessive bleeding in premenopausal period       Plan:      The patient s blood pressure is higher than goal but is less than 180 systolically AND less that 110 diastolically. The patient will be discharged home.  A telephone encounter will be created with this note and sent to the patient's primary provider for action. Gloria Solomon CMA on 12/13/2018 at 10:23 AM

## 2018-12-14 ENCOUNTER — MYC MEDICAL ADVICE (OUTPATIENT)
Dept: FAMILY MEDICINE | Facility: CLINIC | Age: 48
End: 2018-12-14

## 2018-12-14 DIAGNOSIS — I10 ESSENTIAL HYPERTENSION WITH GOAL BLOOD PRESSURE LESS THAN 140/90: Primary | ICD-10-CM

## 2018-12-14 RX ORDER — HYDROCHLOROTHIAZIDE 25 MG/1
25 TABLET ORAL DAILY
Qty: 30 TABLET | Refills: 0 | Status: SHIPPED | OUTPATIENT
Start: 2018-12-14 | End: 2019-01-14

## 2018-12-14 NOTE — TELEPHONE ENCOUNTER
Med filled.  Attempted to reach pt to discuss.  Left message to call back.  Will send MyChart with options.    Louise Agrawal

## 2018-12-19 PROBLEM — I10 ESSENTIAL HYPERTENSION WITH GOAL BLOOD PRESSURE LESS THAN 140/90: Status: ACTIVE | Noted: 2018-12-19

## 2018-12-19 RX ORDER — LOSARTAN POTASSIUM 25 MG/1
25 TABLET ORAL DAILY
Qty: 30 TABLET | Refills: 0 | Status: SHIPPED | OUTPATIENT
Start: 2018-12-19 | End: 2019-01-14 | Stop reason: SINTOL

## 2019-01-03 ENCOUNTER — MYC REFILL (OUTPATIENT)
Dept: FAMILY MEDICINE | Facility: CLINIC | Age: 49
End: 2019-01-03

## 2019-01-03 DIAGNOSIS — F41.1 GENERALIZED ANXIETY DISORDER: Primary | ICD-10-CM

## 2019-01-07 ENCOUNTER — MYC REFILL (OUTPATIENT)
Dept: FAMILY MEDICINE | Facility: CLINIC | Age: 49
End: 2019-01-07

## 2019-01-07 RX ORDER — ALPRAZOLAM 1 MG
1 TABLET ORAL 3 TIMES DAILY PRN
Qty: 20 TABLET | Refills: 0 | Status: SHIPPED | OUTPATIENT
Start: 2019-01-07 | End: 2019-04-08

## 2019-01-09 RX ORDER — ALPRAZOLAM 1 MG
1 TABLET ORAL 3 TIMES DAILY PRN
Qty: 20 TABLET | Refills: 0 | OUTPATIENT
Start: 2019-01-09

## 2019-01-14 ENCOUNTER — OFFICE VISIT (OUTPATIENT)
Dept: FAMILY MEDICINE | Facility: CLINIC | Age: 49
End: 2019-01-14
Payer: COMMERCIAL

## 2019-01-14 VITALS
WEIGHT: 167.8 LBS | HEIGHT: 65 IN | BODY MASS INDEX: 27.96 KG/M2 | SYSTOLIC BLOOD PRESSURE: 148 MMHG | TEMPERATURE: 97 F | HEART RATE: 96 BPM | DIASTOLIC BLOOD PRESSURE: 100 MMHG

## 2019-01-14 DIAGNOSIS — F41.1 GENERALIZED ANXIETY DISORDER: Primary | ICD-10-CM

## 2019-01-14 DIAGNOSIS — I10 ESSENTIAL HYPERTENSION WITH GOAL BLOOD PRESSURE LESS THAN 140/90: ICD-10-CM

## 2019-01-14 DIAGNOSIS — Z23 NEED FOR PROPHYLACTIC VACCINATION AND INOCULATION AGAINST INFLUENZA: ICD-10-CM

## 2019-01-14 PROCEDURE — 90682 RIV4 VACC RECOMBINANT DNA IM: CPT | Performed by: FAMILY MEDICINE

## 2019-01-14 PROCEDURE — 99214 OFFICE O/P EST MOD 30 MIN: CPT | Mod: 25 | Performed by: FAMILY MEDICINE

## 2019-01-14 PROCEDURE — 90471 IMMUNIZATION ADMIN: CPT | Performed by: FAMILY MEDICINE

## 2019-01-14 RX ORDER — HYDROCHLOROTHIAZIDE 25 MG/1
25 TABLET ORAL DAILY
Qty: 90 TABLET | Refills: 0 | Status: SHIPPED | OUTPATIENT
Start: 2019-01-14 | End: 2019-04-12

## 2019-01-14 RX ORDER — ESCITALOPRAM OXALATE 10 MG/1
5 TABLET ORAL DAILY
Qty: 30 TABLET | Refills: 1 | Status: SHIPPED | OUTPATIENT
Start: 2019-01-14 | End: 2019-04-08

## 2019-01-14 RX ORDER — VENLAFAXINE HYDROCHLORIDE 37.5 MG/1
37.5 TABLET, EXTENDED RELEASE ORAL DAILY
Qty: 9 TABLET | Refills: 0 | Status: SHIPPED | OUTPATIENT
Start: 2019-01-14 | End: 2019-05-02 | Stop reason: ALTCHOICE

## 2019-01-14 ASSESSMENT — ANXIETY QUESTIONNAIRES
2. NOT BEING ABLE TO STOP OR CONTROL WORRYING: SEVERAL DAYS
1. FEELING NERVOUS, ANXIOUS, OR ON EDGE: SEVERAL DAYS
6. BECOMING EASILY ANNOYED OR IRRITABLE: SEVERAL DAYS
5. BEING SO RESTLESS THAT IT IS HARD TO SIT STILL: NOT AT ALL
GAD7 TOTAL SCORE: 4
7. FEELING AFRAID AS IF SOMETHING AWFUL MIGHT HAPPEN: NOT AT ALL
3. WORRYING TOO MUCH ABOUT DIFFERENT THINGS: SEVERAL DAYS

## 2019-01-14 ASSESSMENT — PATIENT HEALTH QUESTIONNAIRE - PHQ9
SUM OF ALL RESPONSES TO PHQ QUESTIONS 1-9: 2
5. POOR APPETITE OR OVEREATING: NOT AT ALL

## 2019-01-14 ASSESSMENT — MIFFLIN-ST. JEOR: SCORE: 1378.84

## 2019-01-14 NOTE — NURSING NOTE
"Initial BP (!) 148/100   Pulse 96   Temp 97  F (36.1  C) (Tympanic)   Ht 1.646 m (5' 4.8\")   Wt 76.1 kg (167 lb 12.8 oz)   Breastfeeding? No   BMI 28.10 kg/m   Estimated body mass index is 28.1 kg/m  as calculated from the following:    Height as of this encounter: 1.646 m (5' 4.8\").    Weight as of this encounter: 76.1 kg (167 lb 12.8 oz). .      "

## 2019-01-14 NOTE — PROGRESS NOTES
"  SUBJECTIVE:   Tracee Cooper is a 48 year old female who presents to clinic today for the following health issues:    Anxiety Follow-Up    Status since last visit: No change    Other associated symptoms:None    Complicating factors:   Significant life event: No   Current substance abuse: None  Depression symptoms: No  ORVILLE-7 SCORE 7/21/2017 10/30/2018 1/14/2019   Total Score - - -   Total Score - - -   Total Score 6 5 4       ORVILLE-7    Amount of exercise or physical activity: 2-3 days/week for an average of 30-45 minutes    Problems taking medications regularly: No    Medication side effects: none    Diet: regular (no restrictions)    States she has tried a number of medicines in the past.  Recalls she did not like zoloft, felt \"weird\" on it.  Tried others she did not feel were effective.  Has a aunt on lexapro for anxiety which is helpful for her.    Pt states that when she was off of venlafaxine in the past BP returned to normal.  She is hoping a change in medicine will improve her BP.    Feels anxiety has improved somewhat since last visit.  Less stress with her children.        * stopped losartan after 3 days due to diarrhea.  Has only been on HCTZ        Problem list and histories reviewed & adjusted, as indicated.  Additional history: as documented      Reviewed and updated as needed this visit by clinical staff  Tobacco  Allergies  Meds  Problems  Med Hx  Surg Hx  Fam Hx  Soc Hx        Reviewed and updated as needed this visit by Provider  Tobacco  Allergies  Meds  Problems  Med Hx  Surg Hx  Fam Hx         ROS: Remainder of Constitutional, CV, Respiratory, GI,  negative with exception of that mentioned above    PE:  VS as above   Gen:  WN/WD/WH female in NAD   Heart:  RRR without murmur, nl S1, S2, no rubs or gallops   Lungs CTA irish without rales/ronchi/wheezes   Psych: Alert and oriented times 3; coherent speech, normal   rate and volume, able to articulate logical thoughts, able   to " abstract reason, no tangential thoughts, no hallucinations   or delusions  Her affect is bright and appropriate    A/P:      ICD-10-CM    1. Generalized anxiety disorder F41.1 venlafaxine (EFFEXOR-ER) 37.5 MG 24 hr tablet     escitalopram (LEXAPRO) 10 MG tablet   2. Essential hypertension with goal blood pressure less than 140/90 I10 hydrochlorothiazide (HYDRODIURIL) 25 MG tablet   3. Need for prophylactic vaccination and inoculation against influenza Z23 FLU VACCINE, (RIV4) RECOMBINANT HA  , IM (FluBlok, egg free) [05871]- >18 YRS ([facility] recommended  50-64 YRS)     Vaccine Administration, Initial [19430]     Patient Instructions   We'll work on weaning the venlafaxine and beginning the lexapro.  I sent a new prescription for venlafaxine 37.5mg to your pharmacy.  You'll take 1 tablet daily for 1 week.  After that you can begin 1 tablet every other day for 2 doses.    When you cut back to every other day on the venlafaxine you can begin lexapro 5mg daily.  Once you have stopped the venlafaxine you will increase the lexapro to 10mg daily.    We should follow up in about 1 month to see how your blood pressures are doing off the venlafaxine.

## 2019-01-14 NOTE — PATIENT INSTRUCTIONS
We'll work on weaning the venlafaxine and beginning the lexapro.  I sent a new prescription for venlafaxine 37.5mg to your pharmacy.  You'll take 1 tablet daily for 1 week.  After that you can begin 1 tablet every other day for 2 doses.    When you cut back to every other day on the venlafaxine you can begin lexapro 5mg daily.  Once you have stopped the venlafaxine you will increase the lexapro to 10mg daily.    We should follow up in about 1 month to see how your blood pressures are doing off the venlafaxine.

## 2019-01-14 NOTE — PROGRESS NOTES

## 2019-01-15 ASSESSMENT — ANXIETY QUESTIONNAIRES: GAD7 TOTAL SCORE: 4

## 2019-01-25 ENCOUNTER — TELEPHONE (OUTPATIENT)
Dept: FAMILY MEDICINE | Facility: CLINIC | Age: 49
End: 2019-01-25

## 2019-01-25 NOTE — TELEPHONE ENCOUNTER
Panel Management Review      Patient has the following on her problem list:     Hypertension   Last three blood pressure readings:  BP Readings from Last 3 Encounters:   01/14/19 (!) 148/100   12/13/18 (!) 154/100   11/13/18 (!) 162/92     Blood pressure: FAILED    HTN Guidelines:  Age 18-59 BP range:  Less than 140/90  Age 60-85 with Diabetes:  Less than 140/90  Age 60-85 without Diabetes:  less than 150/90      Composite cancer screening  Chart review shows that this patient is due/due soon for the following None  Summary:    Patient is due/failing the following:   BP CHECK    Action needed:   Patient needs office visit for blood pressure in February.    Type of outreach:    reminder set for mid Feb    Questions for provider review:    None                                                                                                                                    Lin Grossman CMA       Chart routed to none .

## 2019-04-08 ENCOUNTER — MYC REFILL (OUTPATIENT)
Dept: FAMILY MEDICINE | Facility: CLINIC | Age: 49
End: 2019-04-08

## 2019-04-08 DIAGNOSIS — F41.1 GENERALIZED ANXIETY DISORDER: ICD-10-CM

## 2019-04-09 RX ORDER — ESCITALOPRAM OXALATE 10 MG/1
5 TABLET ORAL DAILY
Qty: 30 TABLET | Refills: 1 | Status: SHIPPED | OUTPATIENT
Start: 2019-04-09 | End: 2019-06-06 | Stop reason: SINTOL

## 2019-04-09 RX ORDER — ALPRAZOLAM 1 MG
1 TABLET ORAL 3 TIMES DAILY PRN
Qty: 20 TABLET | Refills: 0 | Status: SHIPPED | OUTPATIENT
Start: 2019-04-09 | End: 2019-05-21

## 2019-04-09 NOTE — TELEPHONE ENCOUNTER
Routing refill request to provider for review/approval because:  Drug not on the [facility] refill protocol     Rula Rodrigues RN

## 2019-04-17 DIAGNOSIS — F41.1 GENERALIZED ANXIETY DISORDER: ICD-10-CM

## 2019-04-17 RX ORDER — ESCITALOPRAM OXALATE 10 MG/1
TABLET ORAL
Qty: 30 TABLET | Refills: 0 | OUTPATIENT
Start: 2019-04-17

## 2019-04-17 NOTE — TELEPHONE ENCOUNTER
Medication was already approved on 4/9/2019 by Dr Agrawal with one additional refill. Candace Bernstein RN

## 2019-04-17 NOTE — TELEPHONE ENCOUNTER
"Requested Prescriptions   Pending Prescriptions Disp Refills     escitalopram (LEXAPRO) 10 MG tablet [Pharmacy Med Name: ESCITALOPRAM 10MG TABLETS]  Last Written Prescription Date:  4/9/9  Last Fill Quantity: 30,  # refills: 1   Last office visit: 1/14/2019 with prescribing provider:  holley   Future Office Visit:     30 tablet 0     Sig: TAKE 1/2 TABLET(5 MG) BY MOUTH DAILY FOR 4 DAYS. INCREASE TO 1 TABLET DAILY       SSRIs Protocol Passed - 4/17/2019  8:06 AM        Passed - Recent (12 mo) or future (30 days) visit within the authorizing provider's specialty     Patient had office visit in the last 12 months or has a visit in the next 30 days with authorizing provider or within the authorizing provider's specialty.  See \"Patient Info\" tab in inbasket, or \"Choose Columns\" in Meds & Orders section of the refill encounter.              Passed - Medication is active on med list        Passed - Patient is age 18 or older        Passed - No active pregnancy on record        Passed - No positive pregnancy test in last 12 months          "

## 2019-04-22 ENCOUNTER — MYC REFILL (OUTPATIENT)
Dept: FAMILY MEDICINE | Facility: CLINIC | Age: 49
End: 2019-04-22

## 2019-04-22 DIAGNOSIS — F41.1 GENERALIZED ANXIETY DISORDER: ICD-10-CM

## 2019-04-23 RX ORDER — ESCITALOPRAM OXALATE 10 MG/1
5 TABLET ORAL DAILY
Qty: 30 TABLET | Refills: 0 | OUTPATIENT
Start: 2019-04-23

## 2019-04-23 NOTE — TELEPHONE ENCOUNTER
"Pt should not be out. #30 with 1 refill authorized 4/9/19. Called [facility] in [place], spoke with pharmacy who said pt picked up this Rx yesterday.    Pt Jerilynt comment: Patient comment: I have no pills left I need ASAP please. I made appointment to see Dr. Agrawal  Requested Prescriptions   Pending Prescriptions Disp Refills     escitalopram (LEXAPRO) 10 MG tablet 30 tablet 1     Sig: Take 0.5 tablets (5 mg) by mouth daily For 4 days then increase to 1 tablet daily       SSRIs Protocol Passed - 4/22/2019 11:31 AM        Passed - Recent (12 mo) or future (30 days) visit within the authorizing provider's specialty     Patient had office visit in the last 12 months or has a visit in the next 30 days with authorizing provider or within the authorizing provider's specialty.  See \"Patient Info\" tab in inbasket, or \"Choose Columns\" in Meds & Orders section of the refill encounter.              Passed - Medication is active on med list        Passed - Patient is age 18 or older        Passed - No active pregnancy on record        Passed - No positive pregnancy test in last 12 months        Last Written Prescription Date:  4/9/19  Last Fill Quantity: 30,  # refills: 1   Last office visit: 1/14/2019 with prescribing provider:  Dr. Agrawal   Future Office Visit:   Next 5 appointments (look out 90 days)    May 02, 2019 10:40 AM CDT  Rigo Millan with Louise Agrawal,   [facility] ([facility]) [address] [address] [phone]         ORVILLE-7 SCORE 7/21/2017 10/30/2018 1/14/2019   Total Score - - -   Total Score - - -   Total Score 6 5 4       PHQ-9 SCORE 7/21/2017 10/30/2018 1/14/2019   PHQ-9 Total Score - - -   PHQ-9 Total Score MyChart - - -   PHQ-9 Total Score 4 4 2       Stacie ARAIZA RN      "

## 2019-05-02 ENCOUNTER — OFFICE VISIT (OUTPATIENT)
Dept: FAMILY MEDICINE | Facility: CLINIC | Age: 49
End: 2019-05-02
Payer: COMMERCIAL

## 2019-05-02 VITALS
BODY MASS INDEX: 27.99 KG/M2 | WEIGHT: 168 LBS | DIASTOLIC BLOOD PRESSURE: 108 MMHG | SYSTOLIC BLOOD PRESSURE: 150 MMHG | HEIGHT: 65 IN | HEART RATE: 88 BPM | TEMPERATURE: 97.9 F

## 2019-05-02 DIAGNOSIS — Z85.820 H/O MALIGNANT MELANOMA: ICD-10-CM

## 2019-05-02 DIAGNOSIS — M54.2 NECK PAIN: ICD-10-CM

## 2019-05-02 DIAGNOSIS — Z13.6 CARDIOVASCULAR SCREENING; LDL GOAL LESS THAN 160: ICD-10-CM

## 2019-05-02 DIAGNOSIS — I10 ESSENTIAL HYPERTENSION WITH GOAL BLOOD PRESSURE LESS THAN 140/90: ICD-10-CM

## 2019-05-02 DIAGNOSIS — E04.1 THYROID NODULE: ICD-10-CM

## 2019-05-02 DIAGNOSIS — F41.1 GENERALIZED ANXIETY DISORDER: Primary | ICD-10-CM

## 2019-05-02 PROCEDURE — 99214 OFFICE O/P EST MOD 30 MIN: CPT | Performed by: FAMILY MEDICINE

## 2019-05-02 RX ORDER — ESCITALOPRAM OXALATE 20 MG/1
20 TABLET ORAL DAILY
Qty: 30 TABLET | Refills: 0 | Status: SHIPPED | OUTPATIENT
Start: 2019-05-02 | End: 2019-06-06 | Stop reason: SINTOL

## 2019-05-02 RX ORDER — LISINOPRIL 10 MG/1
10 TABLET ORAL DAILY
Qty: 30 TABLET | Refills: 0 | Status: SHIPPED | OUTPATIENT
Start: 2019-05-02 | End: 2019-05-21 | Stop reason: SINTOL

## 2019-05-02 ASSESSMENT — PATIENT HEALTH QUESTIONNAIRE - PHQ9: 5. POOR APPETITE OR OVEREATING: NOT AT ALL

## 2019-05-02 ASSESSMENT — ANXIETY QUESTIONNAIRES
3. WORRYING TOO MUCH ABOUT DIFFERENT THINGS: SEVERAL DAYS
GAD7 TOTAL SCORE: 3
1. FEELING NERVOUS, ANXIOUS, OR ON EDGE: SEVERAL DAYS
2. NOT BEING ABLE TO STOP OR CONTROL WORRYING: NOT AT ALL
7. FEELING AFRAID AS IF SOMETHING AWFUL MIGHT HAPPEN: NOT AT ALL
6. BECOMING EASILY ANNOYED OR IRRITABLE: SEVERAL DAYS
5. BEING SO RESTLESS THAT IT IS HARD TO SIT STILL: NOT AT ALL

## 2019-05-02 ASSESSMENT — MIFFLIN-ST. JEOR: SCORE: 1374.98

## 2019-05-02 NOTE — PROGRESS NOTES
"  SUBJECTIVE:   Tracee Cooper is a 48 year old female who presents to clinic today for the following   health issues:    Hypertension Follow-up      Outpatient blood pressures are being checked at home.  Results are 140's/100.    Low Salt Diet: no added salt        Anxiety Follow-Up    Status since last visit: Worsened for the last month    Other associated symptoms:None    Complicating factors:   Significant life event: No   Current substance abuse: None  Depression symptoms: No  ORVILLE-7 SCORE 10/30/2018 1/14/2019 5/2/2019   Total Score - - -   Total Score - - -   Total Score 5 4 3     Had a \"bad\" panic attack while driving about 9 months ago.  Has trouble when driving, feels \"scared\".  Feels most of her anxiety currently is centered around driving.  Does happen at other times as well. Has done therapy in the past but not in many years.   Would be interested in restarting.      ORVILLE-7    Amount of exercise or physical activity: 2-3 days/week for an average of 15-30 minutes    Problems taking medications regularly: No    Medication side effects: none    Diet: regular (no restrictions)    * left sided neck pain     Has a pain in the anterior L neck.  Happens maybe a couple of times per month for the last few months.  Has a very tender spot.  Lasts a few days then resolves.  Has not noted a mass.  No pain with swallowing.    Additional history: as documented    Reviewed  and updated as needed this visit by clinical staff  Tobacco  Allergies  Meds  Med Hx  Surg Hx  Fam Hx  Soc Hx        Reviewed and updated as needed this visit by Provider  Tobacco  Meds  Med Hx  Surg Hx  Fam Hx  Soc Hx      ROS: Remainder of Constitutional, CV, Respiratory, GI,  negative with exception of that mentioned above    PE:  VS as above   Gen:  WN/WD/WH female in NAD   Neck:  No adenopathy noted.  ? Very small L thyroid nodule at area of tenderness   Heart:  RRR without murmur, nl S1, S2, no rubs or gallops   Lungs CTA irish " without rales/ronchi/wheezes   Ext:  No pedal edema   Psych: Alert and oriented times 3; coherent speech, normal   rate and volume, able to articulate logical thoughts, able   to abstract reason, no tangential thoughts, no hallucinations   or delusions  Her affect is mildly anxious    A/p:      ICD-10-CM    1. Generalized anxiety disorder F41.1 escitalopram (LEXAPRO) 20 MG tablet     MENTAL HEALTH REFERRAL  - Adult; Outpatient Treatment; Individual/Couples/Family/Group Therapy/Health Psychology; [facility]: [facility] [phone]; We will contact you to schedule the appointment or please call with any questions   2. Essential hypertension with goal blood pressure less than 140/90 I10 lisinopril (PRINIVIL/ZESTRIL) 10 MG tablet     Basic metabolic panel   3. Thyroid nodule E04.1 TSH with free T4 reflex     US Thyroid     Thyroid peroxidase antibody   4. Neck pain M54.2 Thyroid peroxidase antibody   5. H/O Malignant melanoma Z85.820 DERMATOLOGY REFERRAL   6. CARDIOVASCULAR SCREENING; LDL GOAL LESS THAN 160 Z13.6 Lipid panel reflex to direct LDL Fasting     Patient Instructions   Anxiety:  Let's increase the lexapro to 20mg daily.  You can take 2 of your 10mg tablets until you run out.  The new prescription is for 20mg.    Please also schedule to see the counselor when you are able.  You should get a call to schedule in 1-2 days.    We should follow up on this medicine in 4-6 weeks.    Blood pressure:  Please continue your hydrochlorothiazide.  We'll add lisinopril 10mg daily.  Please return in 2 weeks for a blood pressure check with the medical assistant and fasting blood work.  You will need to be fasting for 12 hours (nothing but water) prior to your blood work.    Neck pain:  Perhaps related to your thyroid.  We'll do the blood work in 2 weeks when you return.  Please call [phone] to schedule the ultrasound when you are able.

## 2019-05-02 NOTE — PATIENT INSTRUCTIONS
Anxiety:  Let's increase the lexapro to 20mg daily.  You can take 2 of your 10mg tablets until you run out.  The new prescription is for 20mg.    Please also schedule to see the counselor when you are able.  You should get a call to schedule in 1-2 days.    We should follow up on this medicine in 4-6 weeks.    Blood pressure:  Please continue your hydrochlorothiazide.  We'll add lisinopril 10mg daily.  Please return in 2 weeks for a blood pressure check with the medical assistant and fasting blood work.  You will need to be fasting for 12 hours (nothing but water) prior to your blood work.    Neck pain:  Perhaps related to your thyroid.  We'll do the blood work in 2 weeks when you return.  Please call [phone] to schedule the ultrasound when you are able.

## 2019-05-03 ASSESSMENT — ANXIETY QUESTIONNAIRES: GAD7 TOTAL SCORE: 3

## 2019-05-06 ENCOUNTER — ANCILLARY PROCEDURE (OUTPATIENT)
Dept: ULTRASOUND IMAGING | Facility: CLINIC | Age: 49
End: 2019-05-06
Attending: FAMILY MEDICINE
Payer: COMMERCIAL

## 2019-05-06 DIAGNOSIS — E04.1 THYROID NODULE: ICD-10-CM

## 2019-05-06 PROCEDURE — 76536 US EXAM OF HEAD AND NECK: CPT

## 2019-05-08 ENCOUNTER — TELEPHONE (OUTPATIENT)
Dept: FAMILY MEDICINE | Facility: CLINIC | Age: 49
End: 2019-05-08

## 2019-05-08 DIAGNOSIS — E04.1 THYROID NODULE: Primary | ICD-10-CM

## 2019-05-08 NOTE — TELEPHONE ENCOUNTER
Please call pt.  Thyroid ultrasound did not show any masses in the area of her pain.  It did show 3 nodules on the R side of the thyroid.  Thyroid nodules are very common and usually benign.  We recommend fine needle biopsy when the nodule is 1cm or greater just to make sure.  She has 1 nodule of this size.   I would recommend she schedule an FNA of that nodule    Order is placed.  Would be done in [place].    Louise Agrawal

## 2019-05-15 DIAGNOSIS — I10 ESSENTIAL HYPERTENSION WITH GOAL BLOOD PRESSURE LESS THAN 140/90: ICD-10-CM

## 2019-05-15 RX ORDER — HYDROCHLOROTHIAZIDE 25 MG/1
TABLET ORAL
Qty: 30 TABLET | Refills: 0 | Status: SHIPPED | OUTPATIENT
Start: 2019-05-15 | End: 2019-06-04 | Stop reason: SINTOL

## 2019-05-15 NOTE — TELEPHONE ENCOUNTER
"Requested Prescriptions   Pending Prescriptions Disp Refills     hydrochlorothiazide (HYDRODIURIL) 25 MG tablet [Pharmacy Med Name: HYDROCHLOROTHIAZIDE 25MG TABLETS]  Last Written Prescription Date:  4/12/19  Last Fill Quantity: 30,  # refills: 0   Last office visit: 5/2/2019 with prescribing provider:  holley   Future Office Visit:   Next 5 appointments (look out 90 days)    Jul 22, 2019  5:00 PM CDT  Return Visit with Eunice Mixon [facility] ([facility]) [address] [address] [phone]          30 tablet 0     Sig: TAKE 1 TABLET(25 MG) BY MOUTH DAILY       Diuretics (Including Combos) Protocol Failed - 5/15/2019 10:06 AM        Failed - Blood pressure under 140/90 in past 12 months     BP Readings from Last 3 Encounters:   05/02/19 (!) 150/108   01/14/19 (!) 148/100   12/13/18 (!) 154/100                 Passed - Recent (12 mo) or future (30 days) visit within the authorizing provider's specialty     Patient had office visit in the last 12 months or has a visit in the next 30 days with authorizing provider or within the authorizing provider's specialty.  See \"Patient Info\" tab in inbasket, or \"Choose Columns\" in Meds & Orders section of the refill encounter.              Passed - Medication is active on med list        Passed - Patient is age 18 or older        Passed - No active pregancy on record        Passed - Normal serum creatinine on file in past 12 months     Recent Labs   Lab Test 12/13/18  1003   CR 0.73              Passed - Normal serum potassium on file in past 12 months     Recent Labs   Lab Test 12/13/18  1003   POTASSIUM 3.8                    Passed - Normal serum sodium on file in past 12 months     Recent Labs   Lab Test 12/13/18  1003                 Passed - No positive pregnancy test in past 12 months          "

## 2019-05-15 NOTE — TELEPHONE ENCOUNTER
Routing refill request to provider for review/approval because:  Blood pressure not in range.    Rula Rodrigues RN

## 2019-05-15 NOTE — TELEPHONE ENCOUNTER
Filled for 1 additional month.  Pt needs f/u BP and nonfasting blood work prior to additional refills.  Please call    Louise Agrawal

## 2019-05-16 ENCOUNTER — HOSPITAL ENCOUNTER (OUTPATIENT)
Dept: ULTRASOUND IMAGING | Facility: CLINIC | Age: 49
Discharge: HOME | End: 2019-05-16
Attending: FAMILY MEDICINE | Admitting: FAMILY MEDICINE
Payer: COMMERCIAL

## 2019-05-16 VITALS — SYSTOLIC BLOOD PRESSURE: 142 MMHG | DIASTOLIC BLOOD PRESSURE: 97 MMHG

## 2019-05-16 DIAGNOSIS — E04.1 THYROID NODULE: ICD-10-CM

## 2019-05-16 PROCEDURE — 88173 CYTOPATH EVAL FNA REPORT: CPT | Performed by: RADIOLOGY

## 2019-05-16 PROCEDURE — 88173 CYTOPATH EVAL FNA REPORT: CPT | Mod: 26 | Performed by: RADIOLOGY

## 2019-05-16 PROCEDURE — 10005 FNA BX W/US GDN 1ST LES: CPT

## 2019-05-16 PROCEDURE — 25000125 ZZHC RX 250: Performed by: RADIOLOGY

## 2019-05-16 RX ADMIN — LIDOCAINE HYDROCHLORIDE 3 ML: 10 INJECTION, SOLUTION EPIDURAL; INFILTRATION; INTRACAUDAL; PERINEURAL at 14:00

## 2019-05-16 NOTE — PROGRESS NOTES
RADIOLOGY PROCEDURE NOTE  Patient name: Tracee Cooper  MRN: [id number]  : 1970    Pre-procedure diagnosis: Dominant  Right thyroid nodule.  Post-procedure diagnosis: Same    Procedure Date/Time: May 16, 2019  2:16 PM  Procedure: US guided FNA.  Estimated blood loss: None  Specimen(s) collected with description: Eight FNA samples.  The patient tolerated the procedure well with no immediate complications.    See imaging dictation for procedural details.    Provider name: Teto Riggs  Assistant(s):None

## 2019-05-16 NOTE — IP AVS SNAPSHOT
[facility]  [address] [place]  [place] [address]  Phone:  [phone]                                    After Visit Summary   5/16/2019    Tracee Cooper    MRN: [id number]           After Visit Summary Signature Page    I have received my discharge instructions, and my questions have been answered. I have discussed any challenges I see with this plan with the nurse or doctor.    ..........................................................................................................................................  Patient/Patient Representative Signature      ..........................................................................................................................................  Patient Representative Print Name and Relationship to Patient    ..................................................               ................................................  Date                                   Time    ..........................................................................................................................................  Reviewed by Signature/Title    ...................................................              ..............................................  Date                                               Time          22EPIC Rev 08/18

## 2019-05-16 NOTE — LETTER
May 21, 2019      Daniela Barillas  [address] [address]          Jennie,       The biopsy of your thyroid nodule was benign.  This is great news!  We should simply repeat the ultrasound in about 1 year.      Resulted Orders   Fine needle aspiration   Result Value Ref Range    Copath Report       Patient Name: DANIELA BARILLAS  MR#: [id number]  Specimen #: [id number]  Collected: 5/16/2019  Received: 5/17/2019  Reported: 5/17/2019 19:04  Ordering Phy(s): RAMILA PINK  Additional Phy(s): ARRON LUND    For improved result formatting, select 'View Enhanced Report Format' under   Linked Documents section.    SPECIMEN/STAIN PROCESS:  Thyroid, right , ultrasound guided fine needle aspiration       Pap-Cyto x 16    ----------------------------------------------------------------  CYTOLOGIC INTERPRETATION:    Thyroid gland, right lobe nodule, ultrasound guided fine needle   aspiration:   Benign  Consistent with a benign nodule (includes adenomatoid nodule, colloid   nodule, etc.)    The [place] implied risk of malignancy and recommended clinical   management:  Benign has a 0-3% risk of malignancy, recommended management is clinical   follow-up    Specimen Adequacy: Satisfactory for evaluation.    I have personally reviewed all specimens and/or slides, including the   listed  special stains, and used them  with my medical judgement to determine or confirm the final diagnosis.    Electronically signed out by:    Alexa Roger M.D.    CLINICAL HISTORY:  Right thyroid nodule.    ,    GROSS:  Neck, right thyroid , ultrasound guided fine needle aspiration:  Received   are 16 fixed slides, all Pap  stained.    MICROSCOPIC:  Microscopic examination is performed.    CPT Codes:  A: 00942-AVPI    COLLECTION SITE:  Client:  [facility]  Location:  [facility] ([facility])    The technical component of this testing was completed at the [facility]  [facility], with the professional component performed   at the [facility], [address] [place] [phone] ([phone])           If you have any questions or concerns, please call the clinic at the number listed above.       Sincerely,        Louise Agrawal DO/ag

## 2019-05-16 NOTE — DISCHARGE INSTRUCTIONS
Thyroid Biopsy Discharge Instructions  _____________________________________    Patient Name: Tracee Cooper  Today's Date: May 16, 2019    If you have not received your results after 5 days, please call the doctor who ordered your test.    Diet and medicines    You may go back to your regular diet and medicines.    You may take pain relievers such as Advil (ibuprofen) or Tylenol (acetaminophen).    Activity    You may go back to your normal routine.    No heavy exercise for 24 hours.    Site care    The needle site may have mild bruising, soreness and swelling. This will go away in a few days.     For swelling and bruising, place an ice pack on the site. Never use ice directly on your skin. Use the pack for 20 minutes. Remove it for at least 30 minutes before re-using.    Call your doctor if you have:    Severe pain at the needle site.     Fever over 101  F (38.3  C), taken under the tongue.    Increased redness or swelling.    Fluid oozing or draining from the site.    Go to the emergency room or call 911 if:     You have bleeding that cannot be stopped with direct pressure.     You have trouble breathing.    Your neck swells.    If you have questions, call your hospital:      [facility] at [phone]

## 2019-05-17 LAB — COPATH REPORT: NORMAL

## 2019-05-21 ENCOUNTER — MYC REFILL (OUTPATIENT)
Dept: FAMILY MEDICINE | Facility: CLINIC | Age: 49
End: 2019-05-21

## 2019-05-21 ENCOUNTER — MYC MEDICAL ADVICE (OUTPATIENT)
Dept: FAMILY MEDICINE | Facility: CLINIC | Age: 49
End: 2019-05-21

## 2019-05-21 DIAGNOSIS — I10 ESSENTIAL HYPERTENSION WITH GOAL BLOOD PRESSURE LESS THAN 140/90: Primary | ICD-10-CM

## 2019-05-21 DIAGNOSIS — F41.1 GENERALIZED ANXIETY DISORDER: ICD-10-CM

## 2019-05-22 RX ORDER — ALPRAZOLAM 1 MG
1 TABLET ORAL 3 TIMES DAILY PRN
Qty: 20 TABLET | Refills: 0 | Status: SHIPPED | OUTPATIENT
Start: 2019-05-22 | End: 2019-06-21

## 2019-05-22 RX ORDER — LOSARTAN POTASSIUM 25 MG/1
25 TABLET ORAL DAILY
Qty: 30 TABLET | Refills: 0 | Status: SHIPPED | OUTPATIENT
Start: 2019-05-22 | End: 2019-06-04 | Stop reason: DRUGHIGH

## 2019-06-03 ENCOUNTER — ALLIED HEALTH/NURSE VISIT (OUTPATIENT)
Dept: FAMILY MEDICINE | Facility: CLINIC | Age: 49
End: 2019-06-03
Payer: COMMERCIAL

## 2019-06-03 ENCOUNTER — E-VISIT (OUTPATIENT)
Dept: FAMILY MEDICINE | Facility: CLINIC | Age: 49
End: 2019-06-03
Payer: COMMERCIAL

## 2019-06-03 ENCOUNTER — MYC MEDICAL ADVICE (OUTPATIENT)
Dept: FAMILY MEDICINE | Facility: CLINIC | Age: 49
End: 2019-06-03

## 2019-06-03 VITALS — SYSTOLIC BLOOD PRESSURE: 140 MMHG | DIASTOLIC BLOOD PRESSURE: 89 MMHG | HEART RATE: 116 BPM

## 2019-06-03 DIAGNOSIS — E04.1 THYROID NODULE: ICD-10-CM

## 2019-06-03 DIAGNOSIS — I10 ESSENTIAL HYPERTENSION WITH GOAL BLOOD PRESSURE LESS THAN 140/90: Primary | ICD-10-CM

## 2019-06-03 DIAGNOSIS — F41.1 GENERALIZED ANXIETY DISORDER: Primary | ICD-10-CM

## 2019-06-03 DIAGNOSIS — I10 ESSENTIAL HYPERTENSION WITH GOAL BLOOD PRESSURE LESS THAN 140/90: ICD-10-CM

## 2019-06-03 DIAGNOSIS — M54.2 NECK PAIN: ICD-10-CM

## 2019-06-03 LAB
ANION GAP SERPL CALCULATED.3IONS-SCNC: 4 MMOL/L (ref 3–14)
BUN SERPL-MCNC: 16 MG/DL (ref 7–30)
CALCIUM SERPL-MCNC: 10.6 MG/DL (ref 8.5–10.1)
CHLORIDE SERPL-SCNC: 98 MMOL/L (ref 94–109)
CO2 SERPL-SCNC: 30 MMOL/L (ref 20–32)
CREAT SERPL-MCNC: 0.68 MG/DL (ref 0.52–1.04)
GFR SERPL CREATININE-BSD FRML MDRD: >90 ML/MIN/{1.73_M2}
GLUCOSE SERPL-MCNC: 123 MG/DL (ref 70–99)
POTASSIUM SERPL-SCNC: 3.4 MMOL/L (ref 3.4–5.3)
SODIUM SERPL-SCNC: 132 MMOL/L (ref 133–144)
TSH SERPL DL<=0.005 MIU/L-ACNC: 1.79 MU/L (ref 0.4–4)

## 2019-06-03 PROCEDURE — 84443 ASSAY THYROID STIM HORMONE: CPT | Performed by: FAMILY MEDICINE

## 2019-06-03 PROCEDURE — 36415 COLL VENOUS BLD VENIPUNCTURE: CPT | Performed by: FAMILY MEDICINE

## 2019-06-03 PROCEDURE — 80048 BASIC METABOLIC PNL TOTAL CA: CPT | Performed by: FAMILY MEDICINE

## 2019-06-03 PROCEDURE — 99207 ZZC NO CHARGE NURSE ONLY: CPT

## 2019-06-03 PROCEDURE — 86376 MICROSOMAL ANTIBODY EACH: CPT | Performed by: FAMILY MEDICINE

## 2019-06-03 PROCEDURE — 99444 ZZC PHYSICIAN ONLINE EVALUATION & MANAGEMENT SERVICE: CPT | Performed by: FAMILY MEDICINE

## 2019-06-03 ASSESSMENT — ANXIETY QUESTIONNAIRES
1. FEELING NERVOUS, ANXIOUS, OR ON EDGE: MORE THAN HALF THE DAYS
GAD7 TOTAL SCORE: 8
GAD7 TOTAL SCORE: 8
7. FEELING AFRAID AS IF SOMETHING AWFUL MIGHT HAPPEN: NOT AT ALL
GAD7 TOTAL SCORE: 8
3. WORRYING TOO MUCH ABOUT DIFFERENT THINGS: MORE THAN HALF THE DAYS
4. TROUBLE RELAXING: SEVERAL DAYS
2. NOT BEING ABLE TO STOP OR CONTROL WORRYING: MORE THAN HALF THE DAYS
7. FEELING AFRAID AS IF SOMETHING AWFUL MIGHT HAPPEN: NOT AT ALL
5. BEING SO RESTLESS THAT IT IS HARD TO SIT STILL: NOT AT ALL
6. BECOMING EASILY ANNOYED OR IRRITABLE: SEVERAL DAYS

## 2019-06-03 ASSESSMENT — PATIENT HEALTH QUESTIONNAIRE - PHQ9
10. IF YOU CHECKED OFF ANY PROBLEMS, HOW DIFFICULT HAVE THESE PROBLEMS MADE IT FOR YOU TO DO YOUR WORK, TAKE CARE OF THINGS AT HOME, OR GET ALONG WITH OTHER PEOPLE: SOMEWHAT DIFFICULT
SUM OF ALL RESPONSES TO PHQ QUESTIONS 1-9: 4
SUM OF ALL RESPONSES TO PHQ QUESTIONS 1-9: 4

## 2019-06-03 NOTE — PROGRESS NOTES
SUBJECTIVE:  Tracee Cooper is a 48 year old female who presents for a follow up evaluation of her hypertension.    The reason for the visit is:  a recent medication change    Patient is taking medication as prescribed  Patient is tolerating medications well.  Patient is not monitoring Blood Pressure at home.  Average readings if yes are 120's/90's    Current complaints: been having high pulse, feeling like hyper      Current Outpatient Medications   Medication     ALPRAZolam (XANAX) 1 MG tablet     escitalopram (LEXAPRO) 10 MG tablet     escitalopram (LEXAPRO) 20 MG tablet     hydrochlorothiazide (HYDRODIURIL) 25 MG tablet     losartan (COZAAR) 25 MG tablet     No current facility-administered medications for this visit.        No Known Allergies      OBJECTIVE:  Please get a blood pressure AND a pulse.  A height is also needed if has not been done in the past year.    /89 (BP Location: Left arm, Patient Position: Chair, Cuff Size: Adult Regular)   Pulse 116     Vitals as recorded, a regular cuff was used.    ASSESSMENT:    Is the HYPERTENSION goal on the problem list? Yes  Patient Active Problem List   Diagnosis     H/O Malignant melanoma     Anemia     Hirsutism     CARDIOVASCULAR SCREENING; LDL GOAL LESS THAN 160     Menorrhagia     Generalized anxiety disorder     Chronic diarrhea     Excessive bleeding in premenopausal period     Essential hypertension with goal blood pressure less than 140/90       Plan:  The patient's blood pressure is less than documented goal. The patient will be discharged home. CC: this note to the patient's primary provider.     The patient s blood pressure is higher than goal but is less than 180 systolically AND less that 110 diastolically. The patient will be discharged home.  A telephone encounter will be created with this note and sent to the patient's primary provider for action. yes    The patient's blood pressure is above 180 systolically OR is above 110 diastolically.  The primary provider, RN, or an available provider will be consulted for immediate action. Keep the patient here for appropriate urgent action.

## 2019-06-03 NOTE — TELEPHONE ENCOUNTER
She is due for a blood pressure check in clinic and nonfasting blood work.  She should schedule that now.    We can address the anxiety through an Evisit.    Louise Agrawal

## 2019-06-04 ENCOUNTER — TELEPHONE (OUTPATIENT)
Dept: FAMILY MEDICINE | Facility: CLINIC | Age: 49
End: 2019-06-04

## 2019-06-04 DIAGNOSIS — I10 ESSENTIAL HYPERTENSION WITH GOAL BLOOD PRESSURE LESS THAN 140/90: Primary | ICD-10-CM

## 2019-06-04 LAB — THYROPEROXIDASE AB SERPL-ACNC: <10 IU/ML

## 2019-06-04 RX ORDER — LOSARTAN POTASSIUM 50 MG/1
50 TABLET ORAL DAILY
Qty: 30 TABLET | Refills: 0 | Status: SHIPPED | OUTPATIENT
Start: 2019-06-04 | End: 2019-08-21

## 2019-06-04 ASSESSMENT — ANXIETY QUESTIONNAIRES: GAD7 TOTAL SCORE: 8

## 2019-06-04 ASSESSMENT — PATIENT HEALTH QUESTIONNAIRE - PHQ9: SUM OF ALL RESPONSES TO PHQ QUESTIONS 1-9: 4

## 2019-06-05 ENCOUNTER — TELEPHONE (OUTPATIENT)
Dept: FAMILY MEDICINE | Facility: CLINIC | Age: 49
End: 2019-06-05

## 2019-06-05 NOTE — TELEPHONE ENCOUNTER
Responded to pt regarding this issue in her Evisit.  Awaiting response back there.    Louise Agrawal

## 2019-06-05 NOTE — TELEPHONE ENCOUNTER
Please call pt.  She is currently in the middle of an Evisit and might have another change made to her blood pressure medicines.  I wish to make sure that she stopped the hydrochlorothiazide and knows she needs to come back to recheck the sodium as ordered.    Louise Agrawal

## 2019-06-06 RX ORDER — FLUOXETINE 20 MG/1
20 TABLET, FILM COATED ORAL DAILY
Qty: 30 TABLET | Refills: 0 | Status: SHIPPED | OUTPATIENT
Start: 2019-06-06 | End: 2019-08-21

## 2019-06-06 NOTE — TELEPHONE ENCOUNTER
"I spoke with Jennie. She said she stopped the hydrochlorothiazide today and will come back in for labs and a B/P check in 2 weeks.     Also said the Zoloft just made her feel \"weird\" \"Like I just felt strange on it.\"    She is going to repsond back to the evisit. Candace Bernstein RN    "

## 2019-06-17 ENCOUNTER — TELEPHONE (OUTPATIENT)
Dept: FAMILY MEDICINE | Facility: CLINIC | Age: 49
End: 2019-06-17

## 2019-06-17 NOTE — TELEPHONE ENCOUNTER
Panel Management Review      Patient has the following on her problem list:     Hypertension   Last three blood pressure readings:  BP Readings from Last 3 Encounters:   06/03/19 140/89   05/16/19 (!) 142/97   05/02/19 (!) 150/108     Blood pressure: FAILED    HTN Guidelines:  Less than 140/90      Composite cancer screening  Chart review shows that this patient is due/due soon for the following None  Summary:    Patient is due/failing the following:   shingrix and BP CHECK    Action needed:   Patient needs office visit for f/u on blood pressure and anxiety in one month.    Type of outreach:    reminder set for one month    Questions for provider review:    None                                                                                                                                    Lin Grossman CMA       Chart routed to none.

## 2019-06-17 NOTE — TELEPHONE ENCOUNTER
Received a Prior Authorization (PA) request from [facility] for Venlafaxine ER 75mg    Routed to the [employer] epa team.        Steven Marsh RT (r)  [facility]

## 2019-06-18 ENCOUNTER — TELEPHONE (OUTPATIENT)
Dept: FAMILY MEDICINE | Facility: CLINIC | Age: 49
End: 2019-06-18

## 2019-06-18 DIAGNOSIS — F41.1 GENERALIZED ANXIETY DISORDER: Primary | ICD-10-CM

## 2019-06-18 RX ORDER — VENLAFAXINE HYDROCHLORIDE 150 MG/1
150 TABLET, EXTENDED RELEASE ORAL DAILY
Qty: 30 TABLET | Refills: 0 | Status: SHIPPED | OUTPATIENT
Start: 2019-06-18 | End: 2019-08-21

## 2019-06-18 NOTE — TELEPHONE ENCOUNTER
Received a fax from [facility] Re: Venlafaxine ER 75mg        Steven Marsh RT (r)  [facility]

## 2019-06-19 NOTE — TELEPHONE ENCOUNTER
Prior Authorization Not Needed per Insurance    Medication: venlafaxine- NOT NEEDED  Insurance Company: [employer] - Phone [phone] Fax [phone]  Expected CoPay:      Pharmacy Filling the Rx: [facility] [address] [place] [address] [address] [facility]  Pharmacy Notified: Yes  Patient Notified: Yes    See telephone encounter from 6/18/19.

## 2019-06-21 ENCOUNTER — MYC REFILL (OUTPATIENT)
Dept: FAMILY MEDICINE | Facility: CLINIC | Age: 49
End: 2019-06-21

## 2019-06-21 DIAGNOSIS — F41.1 GENERALIZED ANXIETY DISORDER: ICD-10-CM

## 2019-06-21 RX ORDER — ALPRAZOLAM 1 MG
1 TABLET ORAL 3 TIMES DAILY PRN
Qty: 20 TABLET | Refills: 0 | Status: SHIPPED | OUTPATIENT
Start: 2019-06-21 | End: 2019-08-05

## 2019-06-21 NOTE — TELEPHONE ENCOUNTER
Routing refill request to provider for review/approval because:  Drug not on the [facility] refill protocol     Requested Prescriptions   Pending Prescriptions Disp Refills     ALPRAZolam (XANAX) 1 MG tablet 20 tablet 0     Sig: Take 1 tablet (1 mg) by mouth 3 times daily as needed for anxiety       There is no refill protocol information for this order

## 2019-07-15 ENCOUNTER — OFFICE VISIT (OUTPATIENT)
Dept: PSYCHOLOGY | Facility: CLINIC | Age: 49
End: 2019-07-15
Attending: FAMILY MEDICINE
Payer: COMMERCIAL

## 2019-07-15 DIAGNOSIS — F41.0 PANIC DISORDER WITHOUT AGORAPHOBIA: Primary | ICD-10-CM

## 2019-07-15 PROCEDURE — 90834 PSYTX W PT 45 MINUTES: CPT | Performed by: COUNSELOR

## 2019-07-15 ASSESSMENT — ANXIETY QUESTIONNAIRES
IF YOU CHECKED OFF ANY PROBLEMS ON THIS QUESTIONNAIRE, HOW DIFFICULT HAVE THESE PROBLEMS MADE IT FOR YOU TO DO YOUR WORK, TAKE CARE OF THINGS AT HOME, OR GET ALONG WITH OTHER PEOPLE: VERY DIFFICULT
GAD7 TOTAL SCORE: 8
5. BEING SO RESTLESS THAT IT IS HARD TO SIT STILL: NOT AT ALL
3. WORRYING TOO MUCH ABOUT DIFFERENT THINGS: SEVERAL DAYS
1. FEELING NERVOUS, ANXIOUS, OR ON EDGE: MORE THAN HALF THE DAYS
7. FEELING AFRAID AS IF SOMETHING AWFUL MIGHT HAPPEN: NOT AT ALL
6. BECOMING EASILY ANNOYED OR IRRITABLE: SEVERAL DAYS
2. NOT BEING ABLE TO STOP OR CONTROL WORRYING: NEARLY EVERY DAY

## 2019-07-15 ASSESSMENT — PATIENT HEALTH QUESTIONNAIRE - PHQ9: 5. POOR APPETITE OR OVEREATING: SEVERAL DAYS

## 2019-07-15 NOTE — PROGRESS NOTES
Progress Note - Initial Session    Client Name:  Tracee Cooper Date: 7/15/19         Service Type: Individual  Video Visit: No     Session Start Time: 12:00 pm  Session End Time: 12:45     Session Length: 45 min    Session #: 1    Attendees: Client attended alone     DATA:  Diagnostic Assessment in progress.  Unable to complete documentation at the conclusion of the first session due to time dedicated to explaining limits of confidentiality and rapport building.     Client attends session to address panic attacks that have interfered with driving in the past year. Client notes that she has a history of panic attacks since her 20's.      Interactive Complexity: No  Crisis: No    Intervention:  CBT: Coached on grounding techniques to assist with panic.    ASSESSMENT:  Mental Status Assessment:  Appearance:   Appropriate   Eye Contact:   Good   Psychomotor Behavior: Normal   Attitude:   Cooperative   Orientation:   All  Speech   Rate / Production: Normal    Volume:  Normal   Mood:    Anxious   Affect:    Appropriate   Thought Content:  Clear   Thought Form:  Coherent  Logical   Insight:    Good       Safety Issues and Plan for Safety and Risk Management:  Client denies current fears or concerns for personal safety.  Client denies current or recent suicidal ideation or behaviors.  Client denies current or recent homicidal ideation or behaviors.  Client denies current or recent self injurious behavior or ideation.  Client denies other safety concerns.  Recommended that patient call 911 or go to the local ED should there be a change in any of these risk factors.  Client reports there are no firearms in the house.      Diagnostic Criteria:  1. Recurrent unexpected panic attacks and meets criteria 2, 3, and 4 (below)  2. At least one of the attacks has been followed by 1 month (or more) of one (or more) of the following:     (a) persistent concern about having additional attacks     (b) worry about the  implications of the attack or its consequences  3. Absence of agoraphobia  4. The panic attacks are not to the the direct physiological effects of a substance or general medical condition  5. The panic attacks are not better accounted for by another mental disorder, such as social phobia, specific phobia, OCD, PTSD, or separation anxiety disorder    - The aformentioned symptoms began 1 year ago   ago and occurs 1 days per week and is experienced as moderate.      DSM5 Diagnoses: (Sustained by DSM5 Criteria Listed Above)  Diagnoses: 300.01 (F41.0) Panic Disorder  Psychosocial & Contextual Factors: Mental health concerns for children, past trauma  WHODAS 2.0 (12 item)            This questionnaire asks about difficulties due to health conditions. Health conditions  include  disease or illnesses, other health problems that may be short or long lasting,  injuries, mental health or emotional problems, and problems with alcohol or drugs.                     Think back over the past 30 days and answer these questions, thinking about how much  difficulty you had doing the following activities. For each question, please [place] only  one response.  Completed and to be entered in Diagnostic Assessment.      Collateral Reports Completed:  Completed Diagnostic Assessment to be routed to PCP.       PLAN: (Homework, other):  Follow-up appointment scheduled to complete Diagnostic Interview.     Eunice Mixon MA, [facility]

## 2019-07-15 NOTE — PATIENT INSTRUCTIONS
- 5 things you see, 4 things you feel, 3 things you hear, 2 things you smell, 1 thing you taste    -Square breathing    -Pick a color and count it in the room

## 2019-07-22 ENCOUNTER — OFFICE VISIT (OUTPATIENT)
Dept: PSYCHOLOGY | Facility: CLINIC | Age: 49
End: 2019-07-22
Payer: COMMERCIAL

## 2019-07-22 DIAGNOSIS — F41.1 GENERALIZED ANXIETY DISORDER: Primary | ICD-10-CM

## 2019-07-22 PROCEDURE — 90791 PSYCH DIAGNOSTIC EVALUATION: CPT | Performed by: COUNSELOR

## 2019-07-22 ASSESSMENT — COLUMBIA-SUICIDE SEVERITY RATING SCALE - C-SSRS
1. IN THE PAST MONTH, HAVE YOU WISHED YOU WERE DEAD OR WISHED YOU COULD GO TO SLEEP AND NOT WAKE UP?: NO
4. HAVE YOU HAD THESE THOUGHTS AND HAD SOME INTENTION OF ACTING ON THEM?: NO
3. HAVE YOU BEEN THINKING ABOUT HOW YOU MIGHT KILL YOURSELF?: NO
5. HAVE YOU STARTED TO WORK OUT OR WORKED OUT THE DETAILS OF HOW TO KILL YOURSELF? DO YOU INTEND TO CARRY OUT THIS PLAN?: NO
2. HAVE YOU ACTUALLY HAD ANY THOUGHTS OF KILLING YOURSELF LIFETIME?: NO

## 2019-07-22 ASSESSMENT — PATIENT HEALTH QUESTIONNAIRE - PHQ9: SUM OF ALL RESPONSES TO PHQ QUESTIONS 1-9: 11

## 2019-07-22 NOTE — Clinical Note
Hi Dr. Agrawal,Please see attached for Jennie's completed DA. We will be continuing to assess for PTSD diagnosis, as it appears some current anxiety and panic may be related to past events but insight around this is limited.Let me know if you have any questions, concerns, or insights. Thanks!
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
## 2019-07-23 ASSESSMENT — ANXIETY QUESTIONNAIRES: GAD7 TOTAL SCORE: 8

## 2019-07-30 ENCOUNTER — OFFICE VISIT (OUTPATIENT)
Dept: PSYCHOLOGY | Facility: CLINIC | Age: 49
End: 2019-07-30
Payer: COMMERCIAL

## 2019-07-30 DIAGNOSIS — F41.1 GENERALIZED ANXIETY DISORDER: Primary | ICD-10-CM

## 2019-07-30 PROCEDURE — 90834 PSYTX W PT 45 MINUTES: CPT | Performed by: COUNSELOR

## 2019-07-30 NOTE — PROGRESS NOTES
Progress Note    Patient Name: Tracee Cooper  Date: 7/30/19         Service Type: Individual  Video Visit: No     Session Start Time: 2:00 pm  Session End Time: 2:45     Session Length: 45    Session #: 2    Attendees: Client attended alone     Treatment Plan Last Reviewed: 7/30/19 (Review bu 10/30/19)  CGI: Initial completed  PHQ-9 / ORVILLE-7 : 11/8    DATA  Interactive Complexity: No  Crisis: No       Progress Since Last Session (Related to Symptoms / Goals / Homework):   Symptoms: No change      Homework: n/a      Episode of Care Goals: No improvement - PREPARATION (Decided to change - considering how); Intervened by negotiating a change plan and determining options / strategies for behavior change, identifying triggers, exploring social supports, and working towards setting a date to begin behavior change     Current / Ongoing Stressors and Concerns:   Ongoing: Anxiety and panic attacks while driving     Treatment Objective(s) Addressed in This Session:   Treatment goals developed in today's session.     Intervention:   CBT: Assisted with treatment goal development.        ASSESSMENT: Current Emotional / Mental Status (status of significant symptoms):   Risk status (Self / Other harm or suicidal ideation)   Patient denies current fears or concerns for personal safety.   Patient denies current or recent suicidal ideation or behaviors.   Patientdenies current or recent homicidal ideation or behaviors.   Patient denies current or recent self injurious behavior or ideation.   Patient denies other safety concerns.   Patient Patient reports there has been no change in risk factors since their last session.     PatientPatient reports there has been no change in protective factors since their last session.     Recommended that patient call 911 or go to the local ED should there be a change in any of these risk factors.     Appearance:   Appropriate    Eye Contact:   Good     Psychomotor Behavior: Normal    Attitude:   Cooperative    Orientation:   All   Speech    Rate / Production: Normal     Volume:  Normal    Mood:    Anxious  Depressed    Affect:    Appropriate    Thought Content:  Clear    Thought Form:  Coherent  Logical    Insight:    Good      Medication Review:   No changes to current psychiatric medication(s)     Medication Compliance:   Yes     Changes in Health Issues:   None reported     Chemical Use Review:   Substance Use: Chemical use reviewed, no active concerns identified      Tobacco Use: No current tobacco use.      Diagnosis:  1. Generalized anxiety disorder         Collateral Reports Completed:   Not Applicable    PLAN: (Patient Tasks / Therapist Tasks / Other)  HOMEWORK: Make upbeat playlist for driving. Continue practicing grounding techniques and encouragement while driving.        Eunice Mixon MA, [facility]                                                          ______________________________________________________________________    Treatment Plan    Patient's Name: Tracee Cooper  YOB: 1970    Date: 7/30/19    Diagnoses:  300.02 (F41.1) Generalized Anxiety Disorder   Rule out Posttraumatic Stress Disorder  Psychosocial & Contextual Factors: Past trauma, children's mental health concerns, work stressorsWHODAS: Completed    Referral / Collaboration:  Referral to another professional/service is not indicated at this time.    Anticipated number of session or this episode of care: 20      MeasurableTreatment Goal(s) related to diagnosis / functional impairment(s)  Goal 1: Patient will decrease anxiety levels as evidence by ORVILLE-7 scores, and client report.    I will know I've met my goal when I have less panic attacks, when I can drive to a new place without feeling anxious or panicked.      Objective #A (Patient Action)    Patient will identify 3 initial signs or symptoms of anxiety.  Status: New - Date: 7/30/19     Intervention(s)  Therapist  will assign homework    teach emotional recognition/identification.      Objective #B  Patient will use cognitive strategies identified in therapy to challenge anxious thoughts.  Status: New - Date: 7/30/19     Intervention(s)  Therapist will assign homework    teach CBT techniques to identify and challenge cognitive distortions.    Objective #C  Patient will identify three distraction and diversion activities and use those activities to decrease level of anxiety  .  Status: New - Date: 7/30/19     Intervention(s)  Therapist will assign homework    teach DBT techniques including Mindfulness and Distress Tolerance.        Patient has reviewed and agreed to the above plan.      Eunice Mixon MA, [facility]  July 30, 2019

## 2019-08-05 ENCOUNTER — MYC REFILL (OUTPATIENT)
Dept: FAMILY MEDICINE | Facility: CLINIC | Age: 49
End: 2019-08-05

## 2019-08-05 ENCOUNTER — TELEPHONE (OUTPATIENT)
Dept: PSYCHOLOGY | Facility: CLINIC | Age: 49
End: 2019-08-05

## 2019-08-05 DIAGNOSIS — F41.1 GENERALIZED ANXIETY DISORDER: ICD-10-CM

## 2019-08-05 NOTE — TELEPHONE ENCOUNTER
Left voicemail acknowledging today's No Show and requested a return call within 24 hours to hold future appointments.

## 2019-08-05 NOTE — TELEPHONE ENCOUNTER
Client returned call stating she forgot to cancel due to her son being sick. Confirmed follow-up appointments.

## 2019-08-06 RX ORDER — ALPRAZOLAM 1 MG
1 TABLET ORAL 3 TIMES DAILY PRN
Qty: 20 TABLET | Refills: 0 | Status: SHIPPED | OUTPATIENT
Start: 2019-08-06 | End: 2019-09-04

## 2019-08-21 ENCOUNTER — OFFICE VISIT (OUTPATIENT)
Dept: PSYCHOLOGY | Facility: CLINIC | Age: 49
End: 2019-08-21
Payer: COMMERCIAL

## 2019-08-21 ENCOUNTER — ALLIED HEALTH/NURSE VISIT (OUTPATIENT)
Dept: NURSING | Facility: CLINIC | Age: 49
End: 2019-08-21
Payer: COMMERCIAL

## 2019-08-21 VITALS — OXYGEN SATURATION: 97 % | SYSTOLIC BLOOD PRESSURE: 164 MMHG | DIASTOLIC BLOOD PRESSURE: 100 MMHG | HEART RATE: 88 BPM

## 2019-08-21 DIAGNOSIS — F41.1 GENERALIZED ANXIETY DISORDER: Primary | ICD-10-CM

## 2019-08-21 DIAGNOSIS — I10 HTN (HYPERTENSION): Primary | ICD-10-CM

## 2019-08-21 PROCEDURE — 99207 ZZC NO CHARGE NURSE ONLY: CPT

## 2019-08-21 PROCEDURE — 90834 PSYTX W PT 45 MINUTES: CPT | Performed by: COUNSELOR

## 2019-08-21 PROCEDURE — 93000 ELECTROCARDIOGRAM COMPLETE: CPT

## 2019-08-21 ASSESSMENT — ANXIETY QUESTIONNAIRES
6. BECOMING EASILY ANNOYED OR IRRITABLE: MORE THAN HALF THE DAYS
2. NOT BEING ABLE TO STOP OR CONTROL WORRYING: MORE THAN HALF THE DAYS
IF YOU CHECKED OFF ANY PROBLEMS ON THIS QUESTIONNAIRE, HOW DIFFICULT HAVE THESE PROBLEMS MADE IT FOR YOU TO DO YOUR WORK, TAKE CARE OF THINGS AT HOME, OR GET ALONG WITH OTHER PEOPLE: SOMEWHAT DIFFICULT
5. BEING SO RESTLESS THAT IT IS HARD TO SIT STILL: NOT AT ALL
1. FEELING NERVOUS, ANXIOUS, OR ON EDGE: SEVERAL DAYS
3. WORRYING TOO MUCH ABOUT DIFFERENT THINGS: SEVERAL DAYS
GAD7 TOTAL SCORE: 8
7. FEELING AFRAID AS IF SOMETHING AWFUL MIGHT HAPPEN: SEVERAL DAYS

## 2019-08-21 ASSESSMENT — PATIENT HEALTH QUESTIONNAIRE - PHQ9
SUM OF ALL RESPONSES TO PHQ QUESTIONS 1-9: 9
5. POOR APPETITE OR OVEREATING: SEVERAL DAYS

## 2019-08-21 ASSESSMENT — PAIN SCALES - GENERAL: PAINLEVEL: EXTREME PAIN (8)

## 2019-08-21 NOTE — PROGRESS NOTES
Patient was here for Counseling   Today  Upon leaving  Shared with  she was having some shoulder back pain and pain radiating down her right arm pt does NOT appear in distress      Pain between her shoulder blades  And neck   Pain radiated down her right arm  To fingers   Two fingers  tips are numb     Vital signs  /100 pulse 88 O2 sat 97%    Pt has not taken her BP medication since second week of July  , feels it makes her heart race     Pt does  on computer a lot       Onset:started Sunday  Day 3     sudden onset woke up within       Description (location/character/radiation/duration):  pain feels like a pressure between should blades     Pain down n right arm  burning      Intensity:  8/10  Took IBUP last about 8 AM 400mg     Accompanying signs and symptoms:        Shortness of breath: no        Sweating: no        Nausea/vomitting: YES  Before shoulder pain  Eating and drinking ok        Palpitations: no       Pt states LAST Xanax couple days ago          Other (fevers/chills/cough/heartburn/lightheadedness): YES--Has know heart burn taken Zantac 3 x weeks     History (similar episodes/previous evaluation): had shoulder er pain couple  Years a go     Precipitating or alleviating factors:       Worse with exertion: YES       Worse with breathing: no        Related to eating: no        Better with burping: no     Therapies tried and outcome: pt has been taking IBUP 400mg  Every 4-6  Hours since should pain started Sunday    Not helping      Cold made pain worse  Nothing making pain better     RAH Jordan RN/Phil Celeste    EKG done normal sinus rhythm  Result in chart   PRosi Jordan RN/Phil NAVAS    BP Readings from Last 6 Encounters:   06/03/19 140/89   05/16/19 (!) 142/97   05/02/19 (!) 150/108   01/14/19 (!) 148/100   12/13/18 (!) 154/100   11/13/18 (!) 162/92     Consult Kate,  Not able to see pt today pt does have appt tomorrow , to advised pt she may restart her Losartan  if wants ,ok for pt to leave   Pt advised , educated in S&S of cardiac event and to call 911 or seek ED if need prior to appt tomororw   Strongly encouraged to keep schedluled appt tomorrow  Chart routed to [facility]  RN/Phil Celeste

## 2019-08-21 NOTE — PROGRESS NOTES
Progress Note    Patient Name: Tracee Cooper  Date: 8/21/19         Service Type: Individual  Video Visit: No     Session Start Time: 9am  Session End Time: 9:45     Session Length: 45    Session #: 3    Attendees: Client attended alone     Treatment Plan Last Reviewed: 7/30/19 (Review by 10/30/19)  CGI: Initial completed  PHQ-9 / ORVILLE-7 : 9/8    --Addressed attendance and previous No Show.      DATA  Interactive Complexity: No  Crisis: No       Progress Since Last Session (Related to Symptoms / Goals / Homework):   Symptoms: No change      Homework: n/a      Episode of Care Goals: Minimal progress - ACTION (Actively working towards change); Intervened by reinforcing change plan / affirming steps taken     Current / Ongoing Stressors and Concerns:   Ongoing: Anxiety and panic attacks while driving   Current: Discussed stressors with family, including conflict with sister-in-law and worry over her daughter's health. Also notes continued driving anxiety, although feels this has improved somewhat in the past couple days.     Treatment Objective(s) Addressed in This Session:   Patient will use cognitive strategies identified in therapy to challenge anxious thoughts.     Intervention:   CBT: Identified and challenged worse case scenerio thinking. Coached on grounding techniques to assist with anxiety while driving.        ASSESSMENT: Current Emotional / Mental Status (status of significant symptoms):   Risk status (Self / Other harm or suicidal ideation)   Patient denies current fears or concerns for personal safety.   Patient denies current or recent suicidal ideation or behaviors.   Patientdenies current or recent homicidal ideation or behaviors.   Patient denies current or recent self injurious behavior or ideation.   Patient denies other safety concerns.   Patient Patient reports there has been no change in risk factors since their last session.     PatientPatient reports  there has been no change in protective factors since their last session.     Recommended that patient call 911 or go to the local ED should there be a change in any of these risk factors.     Appearance:   Appropriate    Eye Contact:   Good    Psychomotor Behavior: Normal    Attitude:   Cooperative    Orientation:   All   Speech    Rate / Production: Normal     Volume:  Normal    Mood:    Anxious  Depressed    Affect:    Appropriate    Thought Content:  Clear    Thought Form:  Coherent  Logical    Insight:    Good      Medication Review:   No changes to current psychiatric medication(s)     Medication Compliance:   Yes     Changes in Health Issues:   None reported     Chemical Use Review:   Substance Use: Chemical use reviewed, no active concerns identified      Tobacco Use: No current tobacco use.      Diagnosis:  1. Generalized anxiety disorder         Collateral Reports Completed:   Not Applicable    PLAN: (Patient Tasks / Therapist Tasks / Other)  HOMEWORK: Make upbeat playlist for driving. Continue practicing grounding techniques and encouragement while driving.        Eunice Mixon MA, [facility]                                                          ______________________________________________________________________    Treatment Plan    Patient's Name: Tracee Cooper  YOB: 1970    Date: 7/30/19    Diagnoses:  300.02 (F41.1) Generalized Anxiety Disorder   Rule out Posttraumatic Stress Disorder  Psychosocial & Contextual Factors: Past trauma, children's mental health concerns, work stressors  WHODAS: Completed    Referral / Collaboration:  Referral to another professional/service is not indicated at this time.    Anticipated number of session or this episode of care: 20      MeasurableTreatment Goal(s) related to diagnosis / functional impairment(s)  Goal 1: Patient will decrease anxiety levels as evidence by ORVILLE-7 scores, and client report.    I will know I've met my goal when I have less  panic attacks, when I can drive to a new place without feeling anxious or panicked.      Objective #A (Patient Action)    Patient will identify 3 initial signs or symptoms of anxiety.  Status: New - Date: 7/30/19     Intervention(s)  Therapist will assign homework    teach emotional recognition/identification.      Objective #B  Patient will use cognitive strategies identified in therapy to challenge anxious thoughts.  Status: New - Date: 7/30/19     Intervention(s)  Therapist will assign homework    teach CBT techniques to identify and challenge cognitive distortions.    Objective #C  Patient will identify three distraction and diversion activities and use those activities to decrease level of anxiety  .  Status: New - Date: 7/30/19     Intervention(s)  Therapist will assign homework    teach DBT techniques including Mindfulness and Distress Tolerance.        Patient has reviewed and agreed to the above plan.      Eunice Mixon MA, [facility]  8/21/19

## 2019-08-21 NOTE — PATIENT INSTRUCTIONS
Music - pick out a specific instrument or sound  Who to call, or topics to discuss  Breathing  Upbeat playlist  Self-talk, encouragement, give yourself an out

## 2019-08-22 ENCOUNTER — OFFICE VISIT (OUTPATIENT)
Dept: FAMILY MEDICINE | Facility: CLINIC | Age: 49
End: 2019-08-22
Payer: COMMERCIAL

## 2019-08-22 VITALS
TEMPERATURE: 98.3 F | RESPIRATION RATE: 14 BRPM | DIASTOLIC BLOOD PRESSURE: 92 MMHG | SYSTOLIC BLOOD PRESSURE: 164 MMHG | HEART RATE: 88 BPM | BODY MASS INDEX: 28.51 KG/M2 | WEIGHT: 171.13 LBS | HEIGHT: 65 IN

## 2019-08-22 DIAGNOSIS — F41.1 GENERALIZED ANXIETY DISORDER: ICD-10-CM

## 2019-08-22 DIAGNOSIS — I10 ESSENTIAL HYPERTENSION WITH GOAL BLOOD PRESSURE LESS THAN 140/90: ICD-10-CM

## 2019-08-22 DIAGNOSIS — S29.012A MUSCLE STRAIN OF RIGHT UPPER BACK, INITIAL ENCOUNTER: ICD-10-CM

## 2019-08-22 DIAGNOSIS — M75.41 IMPINGEMENT SYNDROME, SHOULDER, RIGHT: Primary | ICD-10-CM

## 2019-08-22 PROCEDURE — 99214 OFFICE O/P EST MOD 30 MIN: CPT | Performed by: NURSE PRACTITIONER

## 2019-08-22 RX ORDER — CYCLOBENZAPRINE HCL 10 MG
10 TABLET ORAL 3 TIMES DAILY PRN
Qty: 30 TABLET | Refills: 0 | Status: SHIPPED | OUTPATIENT
Start: 2019-08-22 | End: 2020-01-16

## 2019-08-22 RX ORDER — HYDROCODONE BITARTRATE AND ACETAMINOPHEN 5; 325 MG/1; MG/1
1 TABLET ORAL EVERY 6 HOURS PRN
Qty: 18 TABLET | Refills: 0 | Status: SHIPPED | OUTPATIENT
Start: 2019-08-22 | End: 2020-01-16

## 2019-08-22 RX ORDER — LOSARTAN POTASSIUM 25 MG/1
25 TABLET ORAL DAILY
Qty: 30 TABLET | Refills: 0 | Status: SHIPPED | OUTPATIENT
Start: 2019-08-22 | End: 2019-09-05 | Stop reason: SINTOL

## 2019-08-22 RX ORDER — PREDNISONE 20 MG/1
TABLET ORAL
Qty: 15 TABLET | Refills: 0 | Status: SHIPPED | OUTPATIENT
Start: 2019-08-22 | End: 2020-01-16

## 2019-08-22 ASSESSMENT — ENCOUNTER SYMPTOMS
DIAPHORESIS: 0
BACK PAIN: 1
SHORTNESS OF BREATH: 0
DIARRHEA: 0
CHILLS: 0
RHINORRHEA: 0
HEADACHES: 0
ARTHRALGIAS: 1
EYE ITCHING: 0
SORE THROAT: 0
DIZZINESS: 0
CONSTIPATION: 0
CHEST TIGHTNESS: 0
SINUS PRESSURE: 0
NAUSEA: 0
FEVER: 0
EYE DISCHARGE: 0
LIGHT-HEADEDNESS: 0
FATIGUE: 0
WHEEZING: 0
COUGH: 0

## 2019-08-22 ASSESSMENT — ANXIETY QUESTIONNAIRES: GAD7 TOTAL SCORE: 8

## 2019-08-22 ASSESSMENT — MIFFLIN-ST. JEOR: SCORE: 1384.16

## 2019-08-22 ASSESSMENT — PAIN SCALES - GENERAL: PAINLEVEL: EXTREME PAIN (8)

## 2019-08-22 NOTE — PROGRESS NOTES
Subjective     Tracee Cooper is a 49 year old female who presents to clinic today for the following health issues:    HPI     - See appointment from yesterday.    Below recap from nurse visit on 8/21/19    Pain between her shoulder blades  And neck   Pain radiated down her right arm  To fingers   Two fingers  tips are numb     Vital signs  /100 pulse 88 O2 sat 97%     Pt has not taken her BP medication since second week of July  , feels it makes her heart race      Pt does  on computer a lot        Onset:started Sunday  Day 3     sudden onset woke up within       Description (location/character/radiation/duration):  pain feels like a pressure between should blades     Pain down n right arm  burning      Intensity:  8/10  Took IBUP last about 8 AM 400mg     Accompanying signs and symptoms:        Shortness of breath: no        Sweating: no        Nausea/vomitting: YES  Before shoulder pain  Eating and drinking ok        Palpitations: no        Pt states LAST Xanax couple days ago           Other (fevers/chills/cough/heartburn/lightheadedness): YES--Has know heart burn taken Zantac 3 x weeks     History (similar episodes/previous evaluation): had shoulder er pain couple  Years a go     Precipitating or alleviating factors:       Worse with exertion: YES       Worse with breathing: no        Related to eating: no        Better with burping: no     Therapies tried and outcome: pt has been taking IBUP 400mg  Every 4-6  Hours since should pain started Sunday    Not helping       Cold made pain worse  Nothing making pain better      [facility]  CECE/Phil Celeste     EKG done normal sinus rhythm  Result in chart   [facility]  CECE/Phil NAVAS         BP Readings from Last 6 Encounters:   06/03/19 140/89   05/16/19 (!) 142/97   05/02/19 (!) 150/108   01/14/19 (!) 148/100   12/13/18 (!) 154/100   11/13/18 (!) 162/92          Pain to shoulder and back since Sunday. Is not getting any worse. Right  fingers have been numb the last couple of days. No injury that is aware of. No repetative motions or movements with that arm. Has had problems with this shoulder in the past. [place]. Got a cortisone shot at that time. Is currently ibuprofen 3 of them every 4 hours. Is not eating with them. Cold made it worse, heat helps some. No position that does not hurt. Numbness to fingers all the time. Had EKG yesterday that was normal.       Has been on losartan in the past for blood pressure. Felt like was making heart race. Last time that took it was a couple of months ago. Has been on a couple of other blood pressure meds in the past as well. Does feel swollen at times to fingers. Unsure why stopped taking the hydrochlorothiazide and lisinopril that was taking.     Continues to take Effexor for anxiety and does feel like that is helping a lot to control it. Has not taken alprazolam for the last couple of days       Current Outpatient Medications   Medication Sig Dispense Refill     cyclobenzaprine (FLEXERIL) 10 MG tablet Take 1 tablet (10 mg) by mouth 3 times daily as needed for muscle spasms 30 tablet 0     HYDROcodone-acetaminophen (NORCO) 5-325 MG tablet Take 1 tablet by mouth every 6 hours as needed for pain 18 tablet 0     losartan (COZAAR) 25 MG tablet Take 1 tablet (25 mg) by mouth daily 30 tablet 0     predniSONE (DELTASONE) 20 MG tablet Take 1 tab by mouth daily in the AM and PM for 5 days and then 1 tab daily for 3 days and then 1/2 tab daily for 3 days. 15 tablet 0     venlafaxine (EFFEXOR-XR) 150 MG 24 hr capsule Take 1 capsule (150 mg) by mouth daily 90 capsule 0     ALPRAZolam (XANAX) 1 MG tablet Take 1 tablet (1 mg) by mouth 3 times daily as needed for anxiety 20 tablet 0     No Known Allergies      Reviewed and updated as needed this visit by Provider         Review of Systems   Constitutional: Negative for chills, diaphoresis, fatigue and fever.   HENT: Negative for ear  "discharge, ear pain, hearing loss, rhinorrhea, sinus pressure and sore throat.    Eyes: Negative for discharge and itching.   Respiratory: Negative for cough, chest tightness, shortness of breath and wheezing.    Cardiovascular: Negative for chest pain.   Gastrointestinal: Negative for constipation, diarrhea and nausea.   Musculoskeletal: Positive for arthralgias (right shoulder) and back pain (right upper back).   Skin: Negative for rash.   Neurological: Negative for dizziness, light-headedness and headaches.           Objective    /74   Pulse 88   Temp 98.3  F (36.8  C) (Tympanic)   Resp 14   Ht 1.638 m (5' 4.5\")   Wt 77.6 kg (171 lb 2 oz)   BMI 28.92 kg/m    Body mass index is 28.92 kg/m .  Physical Exam   Constitutional: She appears well-developed and well-nourished.   HENT:   Head: Normocephalic and atraumatic.   Right Ear: Tympanic membrane and external ear normal. No middle ear effusion.   Left Ear: Tympanic membrane and external ear normal.  No middle ear effusion.   Nose: No mucosal edema.   Mouth/Throat: Oropharynx is clear and moist and mucous membranes are normal.   Neck: Carotid bruit is not present. No thyromegaly present.   Cardiovascular: Normal rate, regular rhythm and normal heart sounds.   Pulmonary/Chest: Effort normal and breath sounds normal.   Abdominal: Soft. Normal appearance and bowel sounds are normal.   Musculoskeletal: She exhibits no edema.        Right shoulder: She exhibits decreased range of motion, tenderness, pain and decreased strength. She exhibits no bony tenderness, no effusion, no spasm and normal pulse.        Back:    Neurological: She is alert.   Skin: Skin is warm and dry.   Psychiatric: She has a normal mood and affect. Her behavior is normal.             Assessment & Plan     1. Impingement syndrome, shoulder, right  Educated on use of medication and possible side effects.  Encouraged to alternate ice and heat.  Encourage range of motion.  Notify if no " improvement and may need to refer to physical therapy  - predniSONE (DELTASONE) 20 MG tablet; Take 1 tab by mouth daily in the AM and PM for 5 days and then 1 tab daily for 3 days and then 1/2 tab daily for 3 days.  Dispense: 15 tablet; Refill: 0  - cyclobenzaprine (FLEXERIL) 10 MG tablet; Take 1 tablet (10 mg) by mouth 3 times daily as needed for muscle spasms  Dispense: 30 tablet; Refill: 0  - HYDROcodone-acetaminophen (NORCO) 5-325 MG tablet; Take 1 tablet by mouth every 6 hours as needed for pain  Dispense: 18 tablet; Refill: 0  Plan to follow-up in clinic in 3 weeks.    2. Generalized anxiety disorder  Feels like he is doing well on Effexor    3. Essential hypertension with goal blood pressure less than 140/90  Discussion held with Jennie regarding treatment options for high blood pressure.  We will plan to restart losartan after prescription of prednisone is completed due to possible side effect of palpitations with prednisone use.  Is agreeable to this plan.  We will plan to follow-up in clinic in 3 weeks for recheck of blood pressure and side effects.  - losartan (COZAAR) 25 MG tablet; Take 1 tablet (25 mg) by mouth daily  Dispense: 30 tablet; Refill: 0    4. Muscle strain of right upper back, initial encounter  Educated on use of medication and possible side effects.  Encouraged to alternate ice and heat.  Encourage range of motion.  Notify if no improvement and may need to refer to physical therapy.    Previous EKG from yesterday reviewed.    Return in about 3 weeks (around 9/12/2019).    LEONIDAS Bowie [facility]

## 2019-08-22 NOTE — PATIENT INSTRUCTIONS
Plan to follow up in 3-4 for recheck of blood pressure and shoulder pain.     Plan to restart losartan after prescription for prednisone is completed.

## 2019-08-26 ENCOUNTER — OFFICE VISIT (OUTPATIENT)
Dept: PSYCHOLOGY | Facility: CLINIC | Age: 49
End: 2019-08-26
Payer: COMMERCIAL

## 2019-08-26 DIAGNOSIS — F41.1 GENERALIZED ANXIETY DISORDER: Primary | ICD-10-CM

## 2019-08-26 PROCEDURE — 90834 PSYTX W PT 45 MINUTES: CPT | Performed by: COUNSELOR

## 2019-08-26 NOTE — PROGRESS NOTES
Progress Note    Patient Name: Tracee Cooper  Date: 8/26/19         Service Type: Individual  Video Visit: No     Session Start Time: 11am  Session End Time: 11:45     Session Length: 45    Session #: 4    Attendees: Client attended alone     Treatment Plan Last Reviewed: 7/30/19 (Review by 10/30/19)  CGI: Initial completed  PHQ-9 / ORVILLE-7 : ---        DATA  Interactive Complexity: No  Crisis: No       Progress Since Last Session (Related to Symptoms / Goals / Homework):   Symptoms: No change      Homework: Completed in session      Episode of Care Goals: Minimal progress - ACTION (Actively working towards change); Intervened by reinforcing change plan / affirming steps taken     Current / Ongoing Stressors and Concerns:   Ongoing: Anxiety and panic attacks while driving   Current: Discussed anxiety around driving, and an upcoming appointment for her son that she is nervous about driving to.     Treatment Objective(s) Addressed in This Session:   Patient will use cognitive strategies identified in therapy to challenge anxious thoughts.     Intervention:   CBT: Offered psychoeducation around anxiety cycle. Coached on fear ladder development and technniques for practicing exposure to triggers in small steps.        ASSESSMENT: Current Emotional / Mental Status (status of significant symptoms):   Risk status (Self / Other harm or suicidal ideation)   Patient denies current fears or concerns for personal safety.   Patient denies current or recent suicidal ideation or behaviors.   Patientdenies current or recent homicidal ideation or behaviors.   Patient denies current or recent self injurious behavior or ideation.   Patient denies other safety concerns.   Patient Patient reports there has been no change in risk factors since their last session.     PatientPatient reports there has been no change in protective factors since their last session.     Recommended that patient  call 911 or go to the local ED should there be a change in any of these risk factors.     Appearance:   Appropriate    Eye Contact:   Good    Psychomotor Behavior: Normal    Attitude:   Cooperative    Orientation:   All   Speech    Rate / Production: Normal     Volume:  Normal    Mood:    Anxious  Depressed    Affect:    Appropriate    Thought Content:  Clear    Thought Form:  Coherent  Logical    Insight:    Good      Medication Review:   No changes to current psychiatric medication(s)     Medication Compliance:   Yes     Changes in Health Issues:   None reported     Chemical Use Review:   Substance Use: Chemical use reviewed, no active concerns identified      Tobacco Use: No current tobacco use.      Diagnosis:  1. Generalized anxiety disorder         Collateral Reports Completed:   Not Applicable    PLAN: (Patient Tasks / Therapist Tasks / Other)  HOMEWORK: Begin practicing exposure using fear ladder developed In today's session. Return in next session with tracking info.        Eunice Mixon MA, [facility]                                                          ______________________________________________________________________    Treatment Plan    Patient's Name: Tracee Cooper  YOB: 1970    Date: 7/30/19    Diagnoses:  300.02 (F41.1) Generalized Anxiety Disorder   Rule out Posttraumatic Stress Disorder  Psychosocial & Contextual Factors: Past trauma, children's mental health concerns, work stressors  WHODAS: Completed    Referral / Collaboration:  Referral to another professional/service is not indicated at this time.    Anticipated number of session or this episode of care: 20      MeasurableTreatment Goal(s) related to diagnosis / functional impairment(s)  Goal 1: Patient will decrease anxiety levels as evidence by ORVILLE-7 scores, and client report.    I will know I've met my goal when I have less panic attacks, when I can drive to a new place without feeling anxious or panicked.       Objective #A (Patient Action)    Patient will identify 3 initial signs or symptoms of anxiety.  Status: New - Date: 7/30/19     Intervention(s)  Therapist will assign homework    teach emotional recognition/identification.      Objective #B  Patient will use cognitive strategies identified in therapy to challenge anxious thoughts.  Status: New - Date: 7/30/19     Intervention(s)  Therapist will assign homework    teach CBT techniques to identify and challenge cognitive distortions.    Objective #C  Patient will identify three distraction and diversion activities and use those activities to decrease level of anxiety  .  Status: New - Date: 7/30/19     Intervention(s)  Therapist will assign homework    teach DBT techniques including Mindfulness and Distress Tolerance.        Patient has reviewed and agreed to the above plan.      Eunice Mixon MA, [facility]  8/21/19

## 2019-09-03 ENCOUNTER — MYC REFILL (OUTPATIENT)
Dept: FAMILY MEDICINE | Facility: CLINIC | Age: 49
End: 2019-09-03

## 2019-09-03 ENCOUNTER — MYC MEDICAL ADVICE (OUTPATIENT)
Dept: FAMILY MEDICINE | Facility: CLINIC | Age: 49
End: 2019-09-03

## 2019-09-03 DIAGNOSIS — I10 ESSENTIAL HYPERTENSION WITH GOAL BLOOD PRESSURE LESS THAN 140/90: Primary | ICD-10-CM

## 2019-09-03 DIAGNOSIS — F41.1 GENERALIZED ANXIETY DISORDER: ICD-10-CM

## 2019-09-03 RX ORDER — ALPRAZOLAM 1 MG
1 TABLET ORAL 3 TIMES DAILY PRN
Qty: 20 TABLET | Refills: 0 | Status: CANCELLED | OUTPATIENT
Start: 2019-09-03

## 2019-09-03 RX ORDER — VENLAFAXINE HYDROCHLORIDE 150 MG/1
150 CAPSULE, EXTENDED RELEASE ORAL DAILY
Qty: 90 CAPSULE | Refills: 0 | Status: CANCELLED | OUTPATIENT
Start: 2019-09-03

## 2019-09-05 ENCOUNTER — OFFICE VISIT (OUTPATIENT)
Dept: PSYCHOLOGY | Facility: CLINIC | Age: 49
End: 2019-09-05
Payer: COMMERCIAL

## 2019-09-05 DIAGNOSIS — F41.1 GENERALIZED ANXIETY DISORDER: Primary | ICD-10-CM

## 2019-09-05 PROCEDURE — 90834 PSYTX W PT 45 MINUTES: CPT | Performed by: COUNSELOR

## 2019-09-05 RX ORDER — AMLODIPINE BESYLATE 5 MG/1
5 TABLET ORAL DAILY
Qty: 30 TABLET | Refills: 0 | Status: SHIPPED | OUTPATIENT
Start: 2019-09-05 | End: 2019-10-02

## 2019-09-05 ASSESSMENT — ANXIETY QUESTIONNAIRES
2. NOT BEING ABLE TO STOP OR CONTROL WORRYING: SEVERAL DAYS
IF YOU CHECKED OFF ANY PROBLEMS ON THIS QUESTIONNAIRE, HOW DIFFICULT HAVE THESE PROBLEMS MADE IT FOR YOU TO DO YOUR WORK, TAKE CARE OF THINGS AT HOME, OR GET ALONG WITH OTHER PEOPLE: SOMEWHAT DIFFICULT
GAD7 TOTAL SCORE: 6
5. BEING SO RESTLESS THAT IT IS HARD TO SIT STILL: NOT AT ALL
1. FEELING NERVOUS, ANXIOUS, OR ON EDGE: SEVERAL DAYS
3. WORRYING TOO MUCH ABOUT DIFFERENT THINGS: MORE THAN HALF THE DAYS
6. BECOMING EASILY ANNOYED OR IRRITABLE: SEVERAL DAYS
7. FEELING AFRAID AS IF SOMETHING AWFUL MIGHT HAPPEN: NOT AT ALL

## 2019-09-05 ASSESSMENT — PATIENT HEALTH QUESTIONNAIRE - PHQ9
5. POOR APPETITE OR OVEREATING: SEVERAL DAYS
SUM OF ALL RESPONSES TO PHQ QUESTIONS 1-9: 4

## 2019-09-05 NOTE — PROGRESS NOTES
"                                           Progress Note    Patient Name: Tracee Cooper  Date: 9/5/19         Service Type: Individual  Video Visit: No     Session Start Time: 1:30am  Session End Time: 2:15     Session Length: 45    Session #: 5    Attendees: Client attended alone     Treatment Plan Last Reviewed: 7/30/19 (Review by 10/30/19)  CGI: Initial completed  PHQ-9 / ORVILLE-7 : 4/6        DATA  Interactive Complexity: No  Crisis: No       Progress Since Last Session (Related to Symptoms / Goals / Homework):   Symptoms: No change      Homework: Achieved / completed to satisfaction      Episode of Care Goals: Minimal progress - ACTION (Actively working towards change); Intervened by reinforcing change plan / affirming steps taken     Current / Ongoing Stressors and Concerns:   Ongoing: Anxiety and panic attacks while driving   Current: Discussed \"what if\" thoughts that increase anxiety and shame.     Treatment Objective(s) Addressed in This Session:   Patient will use cognitive strategies identified in therapy to challenge anxious thoughts.     Intervention:   CBT: Offered psychoeducation around anxiety cycle. Discussed steps of increasing awareness around cognitive distortions, validating them, and checking the facts. Practiced this process with recent worry thoughts about her son's disability.        ASSESSMENT: Current Emotional / Mental Status (status of significant symptoms):   Risk status (Self / Other harm or suicidal ideation)   Patient denies current fears or concerns for personal safety.   Patient denies current or recent suicidal ideation or behaviors.   Patientdenies current or recent homicidal ideation or behaviors.   Patient denies current or recent self injurious behavior or ideation.   Patient denies other safety concerns.   Patient Patient reports there has been no change in risk factors since their last session.     PatientPatient reports there has been no change in protective factors since " "their last session.     Recommended that patient call 911 or go to the local ED should there be a change in any of these risk factors.     Appearance:   Appropriate    Eye Contact:   Good    Psychomotor Behavior: Normal    Attitude:   Cooperative    Orientation:   All   Speech    Rate / Production: Normal     Volume:  Normal    Mood:    Anxious  Depressed    Affect:    Appropriate    Thought Content:  Clear    Thought Form:  Coherent  Logical    Insight:    Good      Medication Review:   No changes to current psychiatric medication(s)     Medication Compliance:   Yes     Changes in Health Issues:   None reported     Chemical Use Review:   Substance Use: Chemical use reviewed, no active concerns identified      Tobacco Use: No current tobacco use.      Diagnosis:  1. Generalized anxiety disorder         Collateral Reports Completed:   Not Applicable    PLAN: (Patient Tasks / Therapist Tasks / Other)  HOMEWORK: Practice mindfulness around when engaging in \"What if\" worry thoughts.        Eunice Mixon MA, [facility]                                                          ______________________________________________________________________    Treatment Plan    Patient's Name: Tracee Cooper  YOB: 1970    Date: 7/30/19    Diagnoses:  300.02 (F41.1) Generalized Anxiety Disorder   Rule out Posttraumatic Stress Disorder  Psychosocial & Contextual Factors: Past trauma, children's mental health concerns, work stressors  WHODAS: Completed    Referral / Collaboration:  Referral to another professional/service is not indicated at this time.    Anticipated number of session or this episode of care: 20      MeasurableTreatment Goal(s) related to diagnosis / functional impairment(s)  Goal 1: Patient will decrease anxiety levels as evidence by ORVILLE-7 scores, and client report.    I will know I've met my goal when I have less panic attacks, when I can drive to a new place without feeling anxious or panicked.  "     Objective #A (Patient Action)    Patient will identify 3 initial signs or symptoms of anxiety.  Status: New - Date: 7/30/19     Intervention(s)  Therapist will assign homework    teach emotional recognition/identification.      Objective #B  Patient will use cognitive strategies identified in therapy to challenge anxious thoughts.  Status: New - Date: 7/30/19     Intervention(s)  Therapist will assign homework    teach CBT techniques to identify and challenge cognitive distortions.    Objective #C  Patient will identify three distraction and diversion activities and use those activities to decrease level of anxiety  .  Status: New - Date: 7/30/19     Intervention(s)  Therapist will assign homework    teach DBT techniques including Mindfulness and Distress Tolerance.        Patient has reviewed and agreed to the above plan.      Eunice Mixon MA, [facility]  9/5/19

## 2019-09-06 ENCOUNTER — ANCILLARY PROCEDURE (OUTPATIENT)
Dept: MRI IMAGING | Facility: CLINIC | Age: 49
End: 2019-09-06
Attending: PEDIATRICS
Payer: COMMERCIAL

## 2019-09-06 ENCOUNTER — ANCILLARY PROCEDURE (OUTPATIENT)
Dept: GENERAL RADIOLOGY | Facility: CLINIC | Age: 49
End: 2019-09-06
Attending: PEDIATRICS
Payer: COMMERCIAL

## 2019-09-06 ENCOUNTER — OFFICE VISIT (OUTPATIENT)
Dept: ORTHOPEDICS | Facility: CLINIC | Age: 49
End: 2019-09-06
Payer: COMMERCIAL

## 2019-09-06 VITALS
SYSTOLIC BLOOD PRESSURE: 134 MMHG | DIASTOLIC BLOOD PRESSURE: 82 MMHG | HEIGHT: 65 IN | BODY MASS INDEX: 28.49 KG/M2 | WEIGHT: 171 LBS

## 2019-09-06 DIAGNOSIS — M79.601 RIGHT ARM PAIN: ICD-10-CM

## 2019-09-06 DIAGNOSIS — M54.12 CERVICAL RADICULOPATHY: ICD-10-CM

## 2019-09-06 DIAGNOSIS — M79.601 RIGHT ARM PAIN: Primary | ICD-10-CM

## 2019-09-06 PROCEDURE — 72040 X-RAY EXAM NECK SPINE 2-3 VW: CPT

## 2019-09-06 PROCEDURE — 99244 OFF/OP CNSLTJ NEW/EST MOD 40: CPT | Performed by: PEDIATRICS

## 2019-09-06 PROCEDURE — 72141 MRI NECK SPINE W/O DYE: CPT | Mod: TC

## 2019-09-06 RX ORDER — GABAPENTIN 300 MG/1
CAPSULE ORAL
Qty: 60 CAPSULE | Refills: 0 | Status: SHIPPED | OUTPATIENT
Start: 2019-09-06 | End: 2020-01-16

## 2019-09-06 ASSESSMENT — ANXIETY QUESTIONNAIRES: GAD7 TOTAL SCORE: 6

## 2019-09-06 ASSESSMENT — MIFFLIN-ST. JEOR: SCORE: 1383.59

## 2019-09-06 NOTE — LETTER
9/6/2019         RE: Tracee Cooper  [address]  [place] [address]        Dear Colleague,    Thank you for referring your patient, Tracee Cooper, to the [facility]. Please see a copy of my visit note below.    Sports Medicine Clinic Visit    PCP: Louise Agrawal    Tracee Cooper is a 49 year old female who is seen  in consultation at the request of Rae LAUREN CNP presenting with right arm pain.  Pain has been present for about 2 weeks with no known injury, she feels she may have slept funny.  Pain from neck, across shoulder, down arm and into her hand as well as into her scapula.  .  Consistent numbness in her index finger, with occasional numbness in her thumb and long finger.   She is right hand dominant.    Pain with cervical movement, no pain with shoulder improvement.      **  No apparent onset of pain other than walking a dog with a leash in her right hand. Pain began with waking up one morning 2 weeks ago. Pain is located on the right lateral neck, radiating across posterior right shoulder, down right arm and into her right hand with associated numbness and tingling primarily in the index finger, but occasionally radiates to her thumb and long finger.    Pain with sleeping on sides and with sitting. Has tried muscle relaxants, and ibuprofen for slight relief; narcotic provided more relief.     Injury: gradual onset     Location of Pain: right arm   Duration of Pain: 2 week(s)  Rating of Pain at worst: 10/10  Rating of Pain Currently: 8/10  Symptoms are better with: muscle relaxant and ibuprofen   Symptoms are worse with: positioning, neck rotation, sitting at computer, using mouse   Additional Features:   Positive: paresthesias and numbness   Negative: swelling, bruising, popping, grinding, catching, locking, instability, pain in other joints and systemic symptoms  Other evaluation and/or treatments so far consists of: Other medications:  muscle relaxant   Prior History of related problems: denies     Social History: computer work,      Review of Systems  Musculoskeletal: as above  Remainder of review of systems is negative including constitutional, CV, pulmonary, GI, Skin and Neurologic except as noted in HPI or medical history.    Past Medical History:   Diagnosis Date     INFERTILITY 2006 - Clomid trial at 50mg and increase to 100 if not ovulating by predictor.  Discussed clotting, PMS, risks etc.  Referral to fertility specialist in meantime.     Other malignant neoplasm of skin, site unspecified      Premenstrual tension syndromes 3/29/2005     Past Surgical History:   Procedure Laterality Date     C/SECTION, LOW TRANSVERSE      , Low Transverse     D & C  3/8/06    Missed AB at 7 weeks     DILATION AND CURETTAGE, HYSTEROSCOPY, ABLATE ENDOMETRIUM, COMBINED N/A 11/3/2016    Procedure: COMBINED DILATION AND CURETTAGE, HYSTEROSCOPY, ABLATE ENDOMETRIUM;  Surgeon: Jane Marquez MD;  Location: [facility]     HYSTEROSCOPY  2006    F/U US Abnormalit s/p d&c     SURGICAL HISTORY OF -   2002    Malignant melanoma of the right arm.     Family History   Problem Relation Age of Onset     Prostate Cancer Father      Thyroid Disease Mother         Hypothyroid     Hypertension Mother      C.A.D. Maternal Grandfather      Cerebrovascular Disease Maternal Grandmother      Breast Cancer Maternal Aunt      Diabetes Paternal Grandmother         adult onset     Cancer Paternal Grandfather         stomach     Cancer - colorectal No family hx of      Social History     Socioeconomic History     Marital status:      Spouse name: Not on file     Number of children: 1     Years of education: Not on file     Highest education level: Not on file   Occupational History     Employer: SELF   Social Needs     Financial resource strain: Not on file     Food insecurity:     Worry: Not on file     Inability:  "Not on file     Transportation needs:     Medical: Not on file     Non-medical: Not on file   Tobacco Use     Smoking status: Former Smoker     Types: Cigarettes     Last attempt to quit: 2006     Years since quittin.6     Smokeless tobacco: Never Used   Substance and Sexual Activity     Alcohol use: Yes     Comment: occasionally     Drug use: No     Sexual activity: Yes     Partners: Male     Birth control/protection: Surgical     Comment: vasectomy   Lifestyle     Physical activity:     Days per week: Not on file     Minutes per session: Not on file     Stress: Not on file   Relationships     Social connections:     Talks on phone: Not on file     Gets together: Not on file     Attends Holiness service: Not on file     Active member of club or organization: Not on file     Attends meetings of clubs or organizations: Not on file     Relationship status: Not on file     Intimate partner violence:     Fear of current or ex partner: Not on file     Emotionally abused: Not on file     Physically abused: Not on file     Forced sexual activity: Not on file   Other Topics Concern     Parent/sibling w/ CABG, MI or angioplasty before 65F 55M? No   Social History Narrative     Not on file   This document serves as a record of the services and decisions personally performed and made by DO ALAINA Stein. It was created on his behalf by Holly De La Torre, a trained medical scribe. The creation of this document is based the provider's statements to the medical scribe.    Scrjorge De La Torre 12:03 PM 2019      Objective  /82   Ht 1.638 m (5' 4.5\")   Wt 77.6 kg (171 lb)   BMI 28.90 kg/m       GENERAL APPEARANCE: healthy, alert and no distress   GAIT: NORMAL  SKIN: no suspicious lesions or rashes  NEURO: Normal tone, mentation intact and speech normal  PSYCH:  mentation appears normal and affect normal/bright  HEENT: no scleral icterus  CV: distal perfusion intact  RESP: nonlabored breathing    Cervical " Spine Exam    Range of Motion:         mild to moderate limitated forward flexion        mildly limited extension with pain when returning to upright        lateral bending to left with pain        Moderately limited lateral bending to right with pain when returning upright.   Right neck pain radiating to right shoulder and right UE with the above ROM     Inspection:         Mild loss of lordosis    Strength:       C4 (shoulder shrug)  Grossly symmetric       C5 (shoulder abduction) 4/5 right, 5/5 left       C6 (elbow flexion) asymmetric; giving way on right side       C7 (elbow extension) symmetric 5/5       C8 (finger abduction, thumb flexion) Diminished mildly bilaterally finger abduction;   diminished on right with     Reflexes:        C5 (biceps) symmetric normal       C6 (supinator) symmetric normal       C7 (triceps) symmetric normal    Sensation:       grossly intact througout bilateral upper extremities    Special Tests:       positive (+) right Spurling neck pain radiating down shoulder and right arm    Skin:       well perfused       capillary refill brisk    Lymphatics:        no edema noted in the upper extremities       Bilateral Shoulder exam    ROM:        forward flexion 120 degrees with pain down right arm       abduction 130-140       internal rotation, grossly full with left, up to the back pocket with right       external rotation unable to initiate on right, diffuse pain in arm and shoulder    Skin:       no visible deformities       well perfused       capillary refill brisk    Sensation:        normal sensation over shoulder and upper extremity     Strength:  Abduction above  ER 4/5 right, 4+/5 left  IR 5/5 bilat      Radiology  Visualized radiographs of cervical spine obtained today, and reviewed the images with the patient.  Impression: mild DDD, facet arthrosis.    XR Cervical Spine 2/3 Views    Narrative    CERVICAL SPINE THREE VIEWS  9/6/2019 11:17 AM     HISTORY: Right arm  pain.    COMPARISON: None.      Impression    IMPRESSION: Minimal degenerative disc disease at C5-6 and C6-7. There  is also minimal facet hypertrophy at multiple levels in the mid to  lower cervical spine. No evidence for fracture or any posterior  malalignment.    KYLE PRUETT MD         Assessment:  1. Right arm pain    2. Cervical radiculopathy        Plan:  Discussed the assessment with the patient. She has some limited shoulder ROM, but overall picture fits best with c-spine source, cervical radiculopathy.    Radiologic images reviewed and discussed with patient today  We discussed the following: symptom treatment, activity modification/rest, imaging, rehab, injection therapy and medication. Following discussion, plan:  Imaging: MRI of cervical spine ordered today  Rehab: Discussion of physical therapy, await MRI.  Prescription Medication: Prescribed Gabapentin today.    Discussed trial gabapentin for neuropathic pain; she has not had much relief from other medication so far. She desired rx, placed.   Pertinent potential adverse effect profile of prescribed medication(s) was discussed with the patient, who expressed understanding.  Injection: potential PAT; await MRI   Follow up: Clinic will contact patient with MRI results.   Future Considerations: Rehab, injection  Questions answered. The patient indicates understanding of these issues and agrees with the plan.    Jacek Marroquin, , CAQ    CC: Rae oLve            Disclaimer: This note consists of symbols derived from keyboarding, dictation and/or voice recognition software. As a result, there may be errors in the script that have gone undetected. Please consider this when interpreting information found in this chart.    The information in this document, created by a scribe for me, accurately reflects the services I personally performed and the decisions made by me. I have reviewed and approved this document for accuracy.       Again, thank  you for allowing me to participate in the care of your patient.        Sincerely,        Jacek Marroquin, DO

## 2019-09-06 NOTE — PROGRESS NOTES
Sports Medicine Clinic Visit    PCP: Louise Agrawal Kenneth is a 49 year old female who is seen  in consultation at the request of Rae LAUREN CNP presenting with right arm pain.  Pain has been present for about 2 weeks with no known injury, she feels she may have slept funny.  Pain from neck, across shoulder, down arm and into her hand as well as into her scapula.  .  Consistent numbness in her index finger, with occasional numbness in her thumb and long finger.   She is right hand dominant.    Pain with cervical movement, no pain with shoulder improvement.      **  No apparent onset of pain other than walking a dog with a leash in her right hand. Pain began with waking up one morning 2 weeks ago. Pain is located on the right lateral neck, radiating across posterior right shoulder, down right arm and into her right hand with associated numbness and tingling primarily in the index finger, but occasionally radiates to her thumb and long finger.    Pain with sleeping on sides and with sitting. Has tried muscle relaxants, and ibuprofen for slight relief; narcotic provided more relief.     Injury: gradual onset     Location of Pain: right arm   Duration of Pain: 2 week(s)  Rating of Pain at worst: 10/10  Rating of Pain Currently: 8/10  Symptoms are better with: muscle relaxant and ibuprofen   Symptoms are worse with: positioning, neck rotation, sitting at computer, using mouse   Additional Features:   Positive: paresthesias and numbness   Negative: swelling, bruising, popping, grinding, catching, locking, instability, pain in other joints and systemic symptoms  Other evaluation and/or treatments so far consists of: Other medications: muscle relaxant   Prior History of related problems: denies     Social History: computer work,      Review of Systems  Musculoskeletal: as above  Remainder of review of systems is negative including constitutional, CV, pulmonary, GI, Skin and Neurologic except  as noted in HPI or medical history.    Past Medical History:   Diagnosis Date     INFERTILITY 2006 - Clomid trial at 50mg and increase to 100 if not ovulating by predictor.  Discussed clotting, PMS, risks etc.  Referral to fertility specialist in meantime.     Other malignant neoplasm of skin, site unspecified      Premenstrual tension syndromes 3/29/2005     Past Surgical History:   Procedure Laterality Date     C/SECTION, LOW TRANSVERSE      , Low Transverse     D & C  3/8/06    Missed AB at 7 weeks     DILATION AND CURETTAGE, HYSTEROSCOPY, ABLATE ENDOMETRIUM, COMBINED N/A 11/3/2016    Procedure: COMBINED DILATION AND CURETTAGE, HYSTEROSCOPY, ABLATE ENDOMETRIUM;  Surgeon: Jane Marquez MD;  Location: [facility]     HYSTEROSCOPY  2006    F/U US Abnormalit s/p d&c     SURGICAL HISTORY OF -   2002    Malignant melanoma of the right arm.     Family History   Problem Relation Age of Onset     Prostate Cancer Father      Thyroid Disease Mother         Hypothyroid     Hypertension Mother      C.A.D. Maternal Grandfather      Cerebrovascular Disease Maternal Grandmother      Breast Cancer Maternal Aunt      Diabetes Paternal Grandmother         adult onset     Cancer Paternal Grandfather         stomach     Cancer - colorectal No family hx of      Social History     Socioeconomic History     Marital status:      Spouse name: Not on file     Number of children: 1     Years of education: Not on file     Highest education level: Not on file   Occupational History     Employer: SELF   Social Needs     Financial resource strain: Not on file     Food insecurity:     Worry: Not on file     Inability: Not on file     Transportation needs:     Medical: Not on file     Non-medical: Not on file   Tobacco Use     Smoking status: Former Smoker     Types: Cigarettes     Last attempt to quit: 2006     Years since quittin.6     Smokeless tobacco: Never Used  "  Substance and Sexual Activity     Alcohol use: Yes     Comment: occasionally     Drug use: No     Sexual activity: Yes     Partners: Male     Birth control/protection: Surgical     Comment: vasectomy   Lifestyle     Physical activity:     Days per week: Not on file     Minutes per session: Not on file     Stress: Not on file   Relationships     Social connections:     Talks on phone: Not on file     Gets together: Not on file     Attends Yazidism service: Not on file     Active member of club or organization: Not on file     Attends meetings of clubs or organizations: Not on file     Relationship status: Not on file     Intimate partner violence:     Fear of current or ex partner: Not on file     Emotionally abused: Not on file     Physically abused: Not on file     Forced sexual activity: Not on file   Other Topics Concern     Parent/sibling w/ CABG, MI or angioplasty before 65F 55M? No   Social History Narrative     Not on file   This document serves as a record of the services and decisions personally performed and made by DO ALAINA Stein. It was created on his behalf by Holly De La Torre, a trained medical scribe. The creation of this document is based the provider's statements to the medical scribe.    Scribe Holly De La Torre 12:03 PM 9/6/2019      Objective  /82   Ht 1.638 m (5' 4.5\")   Wt 77.6 kg (171 lb)   BMI 28.90 kg/m      GENERAL APPEARANCE: healthy, alert and no distress   GAIT: NORMAL  SKIN: no suspicious lesions or rashes  NEURO: Normal tone, mentation intact and speech normal  PSYCH:  mentation appears normal and affect normal/bright  HEENT: no scleral icterus  CV: distal perfusion intact  RESP: nonlabored breathing    Cervical Spine Exam    Range of Motion:         mild to moderate limitated forward flexion        mildly limited extension with pain when returning to upright        lateral bending to left with pain        Moderately limited lateral bending to right with pain when " returning upright.   Right neck pain radiating to right shoulder and right UE with the above ROM     Inspection:         Mild loss of lordosis    Strength:       C4 (shoulder shrug)  Grossly symmetric       C5 (shoulder abduction) 4/5 right, 5/5 left       C6 (elbow flexion) asymmetric; giving way on right side       C7 (elbow extension) symmetric 5/5       C8 (finger abduction, thumb flexion) Diminished mildly bilaterally finger abduction;   diminished on right with     Reflexes:        C5 (biceps) symmetric normal       C6 (supinator) symmetric normal       C7 (triceps) symmetric normal    Sensation:       grossly intact througout bilateral upper extremities    Special Tests:       positive (+) right Spurling neck pain radiating down shoulder and right arm    Skin:       well perfused       capillary refill brisk    Lymphatics:        no edema noted in the upper extremities       Bilateral Shoulder exam    ROM:        forward flexion 120 degrees with pain down right arm       abduction 130-140       internal rotation, grossly full with left, up to the back pocket with right       external rotation unable to initiate on right, diffuse pain in arm and shoulder    Skin:       no visible deformities       well perfused       capillary refill brisk    Sensation:        normal sensation over shoulder and upper extremity     Strength:  Abduction above  ER 4/5 right, 4+/5 left  IR 5/5 bilat      Radiology  Visualized radiographs of cervical spine obtained today, and reviewed the images with the patient.  Impression: mild DDD, facet arthrosis.    XR Cervical Spine 2/3 Views    Narrative    CERVICAL SPINE THREE VIEWS  9/6/2019 11:17 AM     HISTORY: Right arm pain.    COMPARISON: None.      Impression    IMPRESSION: Minimal degenerative disc disease at C5-6 and C6-7. There  is also minimal facet hypertrophy at multiple levels in the mid to  lower cervical spine. No evidence for fracture or any  posterior  malalignment.    KYLE PRUETT MD         Assessment:  1. Right arm pain    2. Cervical radiculopathy        Plan:  Discussed the assessment with the patient. She has some limited shoulder ROM, but overall picture fits best with c-spine source, cervical radiculopathy.    Radiologic images reviewed and discussed with patient today  We discussed the following: symptom treatment, activity modification/rest, imaging, rehab, injection therapy and medication. Following discussion, plan:  Imaging: MRI of cervical spine ordered today  Rehab: Discussion of physical therapy, await MRI.  Prescription Medication: Prescribed Gabapentin today.    Discussed trial gabapentin for neuropathic pain; she has not had much relief from other medication so far. She desired rx, placed.   Pertinent potential adverse effect profile of prescribed medication(s) was discussed with the patient, who expressed understanding.  Injection: potential PAT; await MRI   Follow up: Clinic will contact patient with MRI results.   Future Considerations: Rehab, injection  Questions answered. The patient indicates understanding of these issues and agrees with the plan.    Jacek Marroquin DO, CAQ    CC: Rae Love            Disclaimer: This note consists of symbols derived from keyboarding, dictation and/or voice recognition software. As a result, there may be errors in the script that have gone undetected. Please consider this when interpreting information found in this chart.    The information in this document, created by a scribe for me, accurately reflects the services I personally performed and the decisions made by me. I have reviewed and approved this document for accuracy.

## 2019-09-06 NOTE — PATIENT INSTRUCTIONS
May continue with ibuprofen if helpful  Icing, heat for comfort  Will obtain MRI cervical spine next  Gabapentin prescription placed  Depending on MRI results, considerations may be physical therapy and epidural steroid injection       Advanced imaging is done by appointment. Some insurance require a prior authorization to be completed which may delay the time until you are able to schedule your appointment.    Please call [place], [place] and [place]: [phone] to schedule your MRI.  Depending on your availability you can usually schedule within the next 1-2 days.    The clinic will call you with results, if you have not heard from the clinic within 3-4 days following your MRI please contact us at the number listed below.     If you have any further questions for your physician or physician s care team you can call [phone] and use option 3 to leave a voice message. Calls received during business hours will be returned same day.

## 2019-09-09 ENCOUNTER — TELEPHONE (OUTPATIENT)
Dept: ORTHOPEDICS | Facility: CLINIC | Age: 49
End: 2019-09-09

## 2019-09-09 ENCOUNTER — TELEPHONE (OUTPATIENT)
Dept: PALLIATIVE MEDICINE | Facility: CLINIC | Age: 49
End: 2019-09-09

## 2019-09-09 DIAGNOSIS — M54.12 CERVICAL RADICULOPATHY: ICD-10-CM

## 2019-09-09 DIAGNOSIS — M79.601 RIGHT ARM PAIN: Primary | ICD-10-CM

## 2019-09-09 NOTE — TELEPHONE ENCOUNTER
Degenerative changes noted, may be affecting right side.  Would offer PAT if interested. Also PT trial. Other consideration is referral to surgeon, given MRI findings and some right UE weakness, though I think reasonable to try injection first.  If injection, f/u 2-3 weeks after. If PT, f/u 3-4 weeks.  Any benefit from gabapentin?  Thanks.  Jacek Marroquin, DO, CAQ

## 2019-09-09 NOTE — TELEPHONE ENCOUNTER
Pre-screening Questions for Radiology Injections:    Injection to be done at which interventional clinic site? [facility] [place]    Instruct patient to arrive as directed prior to the scheduled appointment time:    [place] minutes before      [place]: 30 minutes before; if IV needed 1 hour before     Procedure ordered by     Procedure ordered? SHANDA         Transforaminal Cervical PAT - Dr. Shanell León ONLY    What insurance would patient like us to bill for this procedure? BCBS       Worker's comp or MVA (motor vehicle accident) -Any injection DO NOT SCHEDULE and route to Jessenia Pearl.      [employer] insurance - For SI joint injections, DO NOT SCHEDULE and route Jessenia Pearl.       [employer] - Any injection besides hip/shoulder/knee joint DO NOT SCHEDULE and route to Jessenia Pearl. She will obtain PA and call pt back to schedule procedure or notify pt of denial.       [employer]-Route to Jessenia for review      IF SCHEDULING IN [place] AND NEEDS A PA, IT IS OKAY TO SCHEDULE. [place] HANDLES THEIR OWN PA'S AFTER THE PATIENT IS SCHEDULED. PLEASE SCHEDULE AT LEAST 1 WEEK OUT SO A PA CAN BE OBTAINED.      Any chance of pregnancy? No  If YES, do NOT schedule and route to RN pool    Is an  needed? No     Patient has a drive home? (mandatory) YES: informed     Is patient taking any blood thinners (plavix, coumadin, jantoven, warfarin, heparin, pradaxa or dabigatran )? No   If hold needed, do NOT schedule, route to RN pool     Is patient taking any aspirin products (includes Excedrin and Fiorinal)? No     If more than 325mg/day do NOT schedule; route to RN pool     For CERVICAL procedures, hold all aspirin products for 6 days.     Tell pt that if aspirin product is not held for 6 days, the procedure WILL BE cancelled.      Does the patient have a bleeding or clotting disorder? No     If YES, okay to schedule AND route to RN nurse pool    For any patients with platelet count <100, must be  forwarded to provider    Is patient diabetic?  No  If YES, have them bring their glucometer.    Does patient have an active infection or treated for one within the past week? No     Is patient currently taking any antibiotics?  No     For patients on chronic, preventative, or prophylactic antibiotics, procedures may be scheduled.     For patients on antibiotics for active or recent infection:antibiotic course must have been completed for 4 days    Is patient currently taking any steroid medications? (i.e. Prednisone, Medrol)  No     For patients on steroid medications, course must have been completed for 4 days    Reviewed with patient:  If you are started on any steroids or antibiotics between now and your appointment, you must contact us because the procedure may need to be cancelled.  Yes    Is patient actively being treated for cancer or immunocompromised? No  If YES, do NOT schedule and route to RN pool     Are you able to get on and off an exam table with minimal or no assistance? Yes  If NO, do NOT schedule and route to RN pool    Are you able to roll over and lay on your stomach with minimal or no assistance? Yes  If NO, do NOT schedule and route to RN pool     Any allergies to contrast dye, iodine, shellfish, or numbing and steroid medications? No  If YES, route to RN pool AND add allergy information to appointment notes    Allergies: Patient has no known allergies.      Has the patient had a flu shot or any other vaccinations within 7 days before or after the procedure.  No     Does patient have an MRI/CT?  YES: 2019  (SI joint, hip injections, lumbar sympathetic blocks, and stellate ganglion blocks do not require an MRI)    Was the MRI done w/in the last 3 years?  Yes    Was MRI done at [place]? Yes      If not, where was it done? N/A       If MRI was not done at [place], [facility] or [facility] do NOT schedule and route to nursing.  If pt has an imaging disc, the injection may be scheduled but pt has  to bring disc to appt. If they show up w/out disc the injection cannot be done    Reminders (please tell patient if applicable):       Instructed pt to arrive 30 minutes early for IV start if this is for a cervical procedure, ALL sympathetic (stellate ganglion, hypogastric, or lumbar sympathetic block) and all sedation procedures (RFA, spinal cord stimulation trials).  Not Applicable   -IVs are not routinely placed for Dr. Perry cervical cases   -Dr. Galo: IVs for cervical ESIs and cervical TBDs (not CMBBs/facet inj)      If NPO for sedation, informed patient that it is okay to take medications with sips of water (except if they are to hold blood thinners).  Not Applicable   *DO take blood pressure medication if it is prescribed*      If this is for a cervical PAT, informed patient that aspirin needs to be held for 6 days.   Not Applicable      For all patients not having spinal cord stimulator (SCS) trials or radiofrequency ablations (RFAs), informed patient:    IV sedation is not provided for this procedure.  If you feel that an oral anti-anxiety medication is needed, you can discuss this further with your referring provider or primary care provider.  The Pain Clinic provider will discuss specifics of what the procedure includes at your appointment.  Most procedures last 10-20 minutes.  We use numbing medications to help with any discomfort during the procedure.  Not Applicable      Do not schedule procedures requiring IV placement in the first appointment of the day or first appointment after lunch. Do NOT schedule at 0745, 0815 or 1245.       For patients 85 or older we recommend having an adult stay w/ them for the remainder of the day.       Does the patient have any questions?  NO  Janell Morales  [facility]

## 2019-09-09 NOTE — TELEPHONE ENCOUNTER
Patient returned call.  Spoke with her about results.  Gave results noted below as well as treatment options.  She would like to try injection and PT first, will contact clinic if she desires referral.  She states she has had relief from the gabapentin.  Discussed continues use of gabapentin, as well as needing to taper up or down off the medication.   She verbalized understanding.  Will follow up 2-3 weeks post injection.    Referrals placed  Alissa Collado MS ATC

## 2019-09-09 NOTE — TELEPHONE ENCOUNTER
Results for orders placed or performed in visit on 09/06/19   MRI Cervical spine w/o contrast    Narrative    MRI OF THE CERVICAL SPINE WITHOUT CONTRAST 9/6/2019 3:20 PM    COMPARISON: None    HISTORY: Radiculopathy. Right cervical radiculopathy with right hand  weakness. Right arm pain. Cervical radiculopathy.    TECHNIQUE: Multiplanar, multisequence MRI images of the cervical spine  were acquired without intravenous contrast.    FINDINGS: There is normal alignment of the cervical vertebrae;  however, there is straightening of normal cervical lordosis. Vertebral  body heights of the cervical spine are normal. Marrow signal  throughout the cervical vertebrae is within normal limits. There is no  evidence for fracture or pathologic bony lesion of the cervical spine.      There are posterior disc herniations at C5-C6 and C6-C7 levels. The  cervical intervertebral discs are otherwise within normal limits in  height, contour and signal intensity.    The cervical spinal cord is mildly deformed by degenerative changes at  the C5-C6 and C6-C7 levels. The cervical spinal cord is otherwise  normal in contour. There is no abnormal signal in the cervical spinal  cord. There is no evidence for intrathecal abnormality.    Level by level:    C2-C3: There is no spinal canal or neural foraminal stenosis at this  level.    C3-C4: There is facet arthropathy and uncinate arthropathy  bilaterally. There is mild left foraminal narrowing but no spinal  canal or right foraminal stenosis.    C4-C5: There is facet arthropathy and uncinate arthropathy bilaterally  resulting in mild left foraminal narrowing. There is no right  foraminal or spinal canal narrowing.    C5-C6: There is facet arthropathy bilaterally, uncinate hypertrophy  bilaterally and a posterior broad-based disc-osteophyte complex with a  superimposed small posterior broad-based disc herniation (protrusion).  These findings result in moderate spinal canal stenosis with  mild  flattening of the left anterior surface of the cervical spinal cord,  moderate left foraminal stenosis and mild right foraminal narrowing.    C6-C7: There is facet arthropathy bilaterally, uncinate hypertrophy  bilaterally and a posterior broad-based disc-osteophyte complex with a  superimposed small posterior broad-based disc herniation (protrusion).  These findings result in mild spinal canal narrowing and mild left  foraminal narrowing but no right foraminal stenosis.    C7-T1: There is no spinal canal or neural foraminal stenosis at this  level.      Impression    IMPRESSION: Degenerative changes of the cervical spine as described  above.    HAILEY ARAGON MD

## 2019-09-11 ENCOUNTER — OFFICE VISIT (OUTPATIENT)
Dept: PSYCHOLOGY | Facility: CLINIC | Age: 49
End: 2019-09-11
Payer: COMMERCIAL

## 2019-09-11 DIAGNOSIS — F41.1 GENERALIZED ANXIETY DISORDER: Primary | ICD-10-CM

## 2019-09-11 PROCEDURE — 90834 PSYTX W PT 45 MINUTES: CPT | Performed by: COUNSELOR

## 2019-09-11 NOTE — PROGRESS NOTES
Progress Note    Patient Name: Tracee Cooper  Date: 9/11/19         Service Type: Individual  Video Visit: No     Session Start Time: 10am  Session End Time: 10:45     Session Length: 45    Session #: 6    Attendees: Client attended alone     Treatment Plan Last Reviewed: 7/30/19 (Review by 10/30/19)  CGI: Initial completed  PHQ-9 / ORVILLE-7 : ---        DATA  Interactive Complexity: No  Crisis: No       Progress Since Last Session (Related to Symptoms / Goals / Homework):   Symptoms: No change      Homework: Achieved / completed to satisfaction      Episode of Care Goals: Satisfactory progress - ACTION (Actively working towards change); Intervened by reinforcing change plan / affirming steps taken     Current / Ongoing Stressors and Concerns:   Ongoing: Anxiety and panic attacks while driving   Current: Discussed pain associated with pinched nerve and upcoming appointment that has triggered worry. Also discussed progress and set backs with anxiety around driving.      Treatment Objective(s) Addressed in This Session:   Patient will use cognitive strategies identified in therapy to challenge anxious thoughts.     Intervention:   DBT: Coached on Distress Tolerance and Emotion Regulation skills. Discussed ways to identify vulnerabilities in order to mindfully increase self-care to compensate. Modeled ways to challenge self-judgment when feeling as if there is a set-back with symptoms.         ASSESSMENT: Current Emotional / Mental Status (status of significant symptoms):   Risk status (Self / Other harm or suicidal ideation)   Patient denies current fears or concerns for personal safety.   Patient denies current or recent suicidal ideation or behaviors.   Patientdenies current or recent homicidal ideation or behaviors.   Patient denies current or recent self injurious behavior or ideation.   Patient denies other safety concerns.   Patient Patient reports there has been no  change in risk factors since their last session.     PatientPatient reports there has been no change in protective factors since their last session.     Recommended that patient call 911 or go to the local ED should there be a change in any of these risk factors.     Appearance:   Appropriate    Eye Contact:   Good    Psychomotor Behavior: Normal    Attitude:   Cooperative    Orientation:   All   Speech    Rate / Production: Normal     Volume:  Normal    Mood:    Anxious  Depressed    Affect:    Appropriate    Thought Content:  Clear    Thought Form:  Coherent  Logical    Insight:    Good      Medication Review:   No changes to current psychiatric medication(s)     Medication Compliance:   Yes     Changes in Health Issues:   None reported     Chemical Use Review:   Substance Use: Chemical use reviewed, no active concerns identified      Tobacco Use: No current tobacco use.      Diagnosis:  1. Generalized anxiety disorder         Collateral Reports Completed:   Not Applicable    PLAN: (Patient Tasks / Therapist Tasks / Other)  HOMEWORK: Practice driving, use of skills to manage anxiety and negative self-talk.        Eunice Mixon MA, [facility]                                                          ______________________________________________________________________    Treatment Plan    Patient's Name: Tracee Cooper  YOB: 1970    Date: 7/30/19    Diagnoses:  300.02 (F41.1) Generalized Anxiety Disorder   Rule out Posttraumatic Stress Disorder  Psychosocial & Contextual Factors: Past trauma, children's mental health concerns, work stressors  WHODAS: Completed    Referral / Collaboration:  Referral to another professional/service is not indicated at this time.    Anticipated number of session or this episode of care: 20      MeasurableTreatment Goal(s) related to diagnosis / functional impairment(s)  Goal 1: Patient will decrease anxiety levels as evidence by ORVILLE-7 scores, and client report.    I will  know I've met my goal when I have less panic attacks, when I can drive to a new place without feeling anxious or panicked.      Objective #A (Patient Action)    Patient will identify 3 initial signs or symptoms of anxiety.  Status: New - Date: 7/30/19     Intervention(s)  Therapist will assign homework    teach emotional recognition/identification.      Objective #B  Patient will use cognitive strategies identified in therapy to challenge anxious thoughts.  Status: New - Date: 7/30/19     Intervention(s)  Therapist will assign homework    teach CBT techniques to identify and challenge cognitive distortions.    Objective #C  Patient will identify three distraction and diversion activities and use those activities to decrease level of anxiety  .  Status: New - Date: 7/30/19     Intervention(s)  Therapist will assign homework    teach DBT techniques including Mindfulness and Distress Tolerance.        Patient has reviewed and agreed to the above plan.      Eunice Mixon MA, [facility]  9/11/19

## 2019-09-16 ENCOUNTER — OFFICE VISIT (OUTPATIENT)
Dept: PSYCHOLOGY | Facility: CLINIC | Age: 49
End: 2019-09-16
Payer: COMMERCIAL

## 2019-09-16 DIAGNOSIS — F41.1 GENERALIZED ANXIETY DISORDER: Primary | ICD-10-CM

## 2019-09-16 PROCEDURE — 90834 PSYTX W PT 45 MINUTES: CPT | Performed by: COUNSELOR

## 2019-09-16 NOTE — PROGRESS NOTES
Progress Note    Patient Name: Tracee Cooper  Date: 9/16/19         Service Type: Individual  Video Visit: No     Session Start Time: 10am  Session End Time: 10:45     Session Length: 45    Session #: 6    Attendees: Client attended alone     Treatment Plan Last Reviewed: 7/30/19 (Review by 10/30/19)  CGI: Initial completed  PHQ-9 / ORVILLE-7 : ---        DATA  Interactive Complexity: No  Crisis: No       Progress Since Last Session (Related to Symptoms / Goals / Homework):   Symptoms: No change      Homework: Achieved / completed to satisfaction      Episode of Care Goals: Satisfactory progress - ACTION (Actively working towards change); Intervened by reinforcing change plan / affirming steps taken     Current / Ongoing Stressors and Concerns:   Ongoing: Anxiety and panic attacks while driving   Current: Discussed difficulty driving last week, and negative self-talk associated.      Treatment Objective(s) Addressed in This Session:   Patient will use cognitive strategies identified in therapy to challenge anxious thoughts.     Intervention:   CBT: Coached on cognitive distortions, identified how these may influence anxiety levels. Discussed altering self-talk to decrease symptom elevations and increase self-validation.          ASSESSMENT: Current Emotional / Mental Status (status of significant symptoms):   Risk status (Self / Other harm or suicidal ideation)   Patient denies current fears or concerns for personal safety.   Patient denies current or recent suicidal ideation or behaviors.   Patientdenies current or recent homicidal ideation or behaviors.   Patient denies current or recent self injurious behavior or ideation.   Patient denies other safety concerns.   Patient Patient reports there has been no change in risk factors since their last session.     PatientPatient reports there has been no change in protective factors since their last session.     Recommended  that patient call 911 or go to the local ED should there be a change in any of these risk factors.     Appearance:   Appropriate    Eye Contact:   Good    Psychomotor Behavior: Normal    Attitude:   Cooperative    Orientation:   All   Speech    Rate / Production: Normal     Volume:  Normal    Mood:    Anxious  Depressed    Affect:    Appropriate    Thought Content:  Clear    Thought Form:  Coherent  Logical    Insight:    Good      Medication Review:   No changes to current psychiatric medication(s)     Medication Compliance:   Yes     Changes in Health Issues:   None reported     Chemical Use Review:   Substance Use: Chemical use reviewed, no active concerns identified      Tobacco Use: No current tobacco use.      Diagnosis:  1. Generalized anxiety disorder         Collateral Reports Completed:   Not Applicable    PLAN: (Patient Tasks / Therapist Tasks / Other)  HOMEWORK: Practice identifying Cognitive Distortions; look over handout.        Eunice Mixon MA, [facility]                                                          ______________________________________________________________________    Treatment Plan    Patient's Name: Tracee Cooper  YOB: 1970    Date: 7/30/19    Diagnoses:  300.02 (F41.1) Generalized Anxiety Disorder   Rule out Posttraumatic Stress Disorder  Psychosocial & Contextual Factors: Past trauma, children's mental health concerns, work stressors  WHODAS: Completed    Referral / Collaboration:  Referral to another professional/service is not indicated at this time.    Anticipated number of session or this episode of care: 20      MeasurableTreatment Goal(s) related to diagnosis / functional impairment(s)  Goal 1: Patient will decrease anxiety levels as evidence by ORVILLE-7 scores, and client report.    I will know I've met my goal when I have less panic attacks, when I can drive to a new place without feeling anxious or panicked.      Objective #A (Patient Action)    Patient will  identify 3 initial signs or symptoms of anxiety.  Status: New - Date: 7/30/19     Intervention(s)  Therapist will assign homework    teach emotional recognition/identification.      Objective #B  Patient will use cognitive strategies identified in therapy to challenge anxious thoughts.  Status: New - Date: 7/30/19     Intervention(s)  Therapist will assign homework    teach CBT techniques to identify and challenge cognitive distortions.    Objective #C  Patient will identify three distraction and diversion activities and use those activities to decrease level of anxiety  .  Status: New - Date: 7/30/19     Intervention(s)  Therapist will assign homework    teach DBT techniques including Mindfulness and Distress Tolerance.        Patient has reviewed and agreed to the above plan.      Eunice Mixon MA, [facility]  9/16/19

## 2019-09-17 ENCOUNTER — ANCILLARY PROCEDURE (OUTPATIENT)
Dept: RADIOLOGY | Facility: CLINIC | Age: 49
End: 2019-09-17
Attending: PAIN MEDICINE
Payer: COMMERCIAL

## 2019-09-17 ENCOUNTER — RADIOLOGY INJECTION OFFICE VISIT (OUTPATIENT)
Dept: PALLIATIVE MEDICINE | Facility: CLINIC | Age: 49
End: 2019-09-17
Payer: COMMERCIAL

## 2019-09-17 VITALS — HEART RATE: 87 BPM | DIASTOLIC BLOOD PRESSURE: 100 MMHG | OXYGEN SATURATION: 98 % | SYSTOLIC BLOOD PRESSURE: 155 MMHG

## 2019-09-17 DIAGNOSIS — M54.12 CERVICAL RADICULOPATHY: ICD-10-CM

## 2019-09-17 DIAGNOSIS — M54.12 CERVICAL RADICULOPATHY: Primary | ICD-10-CM

## 2019-09-17 PROCEDURE — 62321 NJX INTERLAMINAR CRV/THRC: CPT | Performed by: PAIN MEDICINE

## 2019-09-17 ASSESSMENT — PAIN SCALES - GENERAL: PAINLEVEL: MODERATE PAIN (5)

## 2019-09-17 NOTE — NURSING NOTE
Pre-procedure Intake    Have you been fasting? NA    If yes, for how long? No     Are you taking a prescribed blood thinner such as coumadin, Plavix, Xarelto?    No    If yes, when did you take your last dose? No     Do you take aspirin?  No    If cervical procedure, have you held aspirin for 6 days?   No     Do you have any allergies to contrast dye, iodine, steroid and/or numbing medications?  NO    Are you currently taking antibiotics or have an active infection?  NO    Have you had a fever/elevated temperature within the past week? NO    Are you currently taking oral steroids? NO    Do you have a ? Yes       Are you pregnant or breastfeeding?  NO    Are the vital signs normal?  Yes  Jaja Aguila MA

## 2019-09-17 NOTE — PROGRESS NOTES
[facility] - Procedure Note    Date of Visit: 9/17/2019    Procedure performed: C7-T1 interlaminar epidural steroid injection with fluoroscopic guidance  Diagnosis: Cervical spondylosis; Cervical radiculitis/radiculopathy  : Payam Galo MD  Anesthesia: none    Indications: Tracee Cooper is a 49 year old female who is seen  for cervical epidural steroid injection. The patient describes bilateral neck pain right greater than left. The patient has been exhibiting symptoms consistent with cervical intraspinal inflammation and radiculopathy. Symptoms have been persistent, disabling, and intermittently severe. The patient reports minimal improvement with conservative treatment, including meds/PT.    Cervical MRI Level by level:     C2-C3: There is no spinal canal or neural foraminal stenosis at this  level.     C3-C4: There is facet arthropathy and uncinate arthropathy  bilaterally. There is mild left foraminal narrowing but no spinal  canal or right foraminal stenosis.     C4-C5: There is facet arthropathy and uncinate arthropathy bilaterally  resulting in mild left foraminal narrowing. There is no right  foraminal or spinal canal narrowing.     C5-C6: There is facet arthropathy bilaterally, uncinate hypertrophy  bilaterally and a posterior broad-based disc-osteophyte complex with a  superimposed small posterior broad-based disc herniation (protrusion).  These findings result in moderate spinal canal stenosis with mild  flattening of the left anterior surface of the cervical spinal cord,  moderate left foraminal stenosis and mild right foraminal narrowing.     C6-C7: There is facet arthropathy bilaterally, uncinate hypertrophy  bilaterally and a posterior broad-based disc-osteophyte complex with a  superimposed small posterior broad-based disc herniation (protrusion).  These findings result in mild spinal canal narrowing and mild left  foraminal narrowing but no right foraminal  stenosis.     C7-T1: There is no spinal canal or neural foraminal stenosis at this  level.                                                                      IMPRESSION: Degenerative changes of the cervical spine as described  above.Allergies:    No Known Allergies     Vitals:  BP (!) 159/101   Pulse 102     Review of Systems: The patient denies recent fever, chills, illness, use of antibiotics or anticoagulants. All other 10-point review of systems negative.     Procedure: The procedure and risks were explained, and informed written consent was obtained from the patient. Risks include but are not limited to: infection, bleeding, increased pain, and damage to soft tissue, nerve, muscle, and vasculature structures. After getting informed consent, patient was brought into the procedure suite and was placed in a prone position on the procedure table. A Pause for the Cause was performed. Patient was prepped and draped in sterile fashion.     The C7-T1 interspace was identified with use of fluoroscopy in AP view. A 25-gauge, 1.5 inch needle was used to anesthetize the skin and subcutaneous tissue entry site with a total of 2 ml of 1% lidocaine. Under fluoroscopic visualization, a 22-gauge, 3.5 inch Tuohy epidural needle was slowly advanced towards the epidural space a few millimeters right of midline. The latter part of the needle advancement was guided with fluoroscopy in the lateral view. The epidural space was identified using loss of resistance technique. After negative aspiration for heme and cerebrospinal fluid, a total of 1 mL of Omnipaque was injected to confirm needle placement. 9 mL of contrast was wasted. Epidurogram confirmed spread within the posterior epidural space. 2 ml of 10mg/ml of dexamethasone and 1 ml of preservative free 0.25% bupivacaine was injected. The needle was removed.  Images were saved to PACS.    The patient tolerated the procedure well, and there was no evidence of procedural  complications. No new sensory or motor deficits were noted following the procedure. The patient was stable and able to ambulate on discharge home. Post-procedure instructions were provided.     Pre-procedure pain score: 5/10 in the neck, 5/10 in the arm  Post-procedure pain score: 0/10 in the neck, 0/10 in the arm    Assessment/Plan: Tracee Cooper is a 49 year old female s/p cervical interlaminar epidural steroid injection today for cervical spondylosis and radiculitis/radiculopathy.     1. Following today's procedure, the patient was advised to contact the [facility] for any of the following:   Fever, chills, or night sweats   New onset of pain, numbness, or weakness   Any questions/concerns regarding the procedure  If unable to contact the Pain Center, the patient was instructed to go to a local Emergency Room for any complications.   2. The patient will receive a follow-up call in 1 week.   3. Follow-up with referring provider in 2 weeks for post-procedure evaluation.    [facility]

## 2019-09-17 NOTE — PATIENT INSTRUCTIONS
[facility]   Procedure Discharge Instructions    Today you saw: Dr. Payam Galo     You had an:  Epidural steroid injection   -cervical     Medications used:  Lidocaine   Bupivacaine   Dexamethasone  Omnipaque     Normal saline          Be cautious . Numbness and/or weakness in the upper extremities may occur for up to 6-8 hours after the procedure due to effect of the local anesthetic    Do not drive for 6 hours. The effect of the local anesthetic could slow your reflexes.     You may resume your regular activities after 24 hours    Avoid strenuous activity for the first 24 hours    You may shower, however avoid swimming, tub baths or hot tubs for 24 hours following your procedure    You may have a mild to moderate increase in pain for several days following the injection.    It may take up to 14 days for the steroid medication to start working although you may feel the effect as early as a few days after the procedure.       You may use ice packs for 10-15 minutes, 3 to 4 times a day at the injection site for comfort    Do not use heat to painful areas for 6 to 8 hours. This will give the local anesthetic time to wear off and prevent you from accidentally burning your skin.     Unless you have been directed to avoid the use of anti-inflammatory medications (NSAIDS), you may use medications such as ibuprofen, Aleve or Tylenol for pain control if needed.     If you were fasting, you may resume your normal diet and medications after the procedure    If you have diabetes, check your blood sugar more frequently than usual as your blood sugar may be higher than normal for 10-14 days following a steroid injection. Contact your doctor who manages your diabetes if your blood sugar is higher than usual    Possible side effects of steroids that you may experience include flushing, elevated blood pressure, increased appetite, mild headaches and restlessness.  All of these symptoms will get better  with time.    If you experience any of the following, call the Pain Clinic during work hours at [phone] or the Provider Line after hours at [phone]:  -Fever over 100 degree F  -Swelling, bleeding, redness, drainage, warmth at the injection site  -Progressive weakness or numbness in your legs or arms  -Loss of bowel or bladder function  -Unusual headache that is not relieved by Tylenol or other pain reliever  -Unusual new onset of pain that is not improving

## 2019-09-17 NOTE — NURSING NOTE
20 gauge Peripheral IV inserted into right anticubital - attempts: 1    Portia Carpio, RN-BSN  [facility]

## 2019-09-23 ENCOUNTER — OFFICE VISIT (OUTPATIENT)
Dept: PSYCHOLOGY | Facility: CLINIC | Age: 49
End: 2019-09-23
Payer: COMMERCIAL

## 2019-09-23 DIAGNOSIS — F41.1 GENERALIZED ANXIETY DISORDER: Primary | ICD-10-CM

## 2019-09-23 PROCEDURE — 90834 PSYTX W PT 45 MINUTES: CPT | Performed by: COUNSELOR

## 2019-09-23 ASSESSMENT — ANXIETY QUESTIONNAIRES
2. NOT BEING ABLE TO STOP OR CONTROL WORRYING: SEVERAL DAYS
IF YOU CHECKED OFF ANY PROBLEMS ON THIS QUESTIONNAIRE, HOW DIFFICULT HAVE THESE PROBLEMS MADE IT FOR YOU TO DO YOUR WORK, TAKE CARE OF THINGS AT HOME, OR GET ALONG WITH OTHER PEOPLE: SOMEWHAT DIFFICULT
GAD7 TOTAL SCORE: 8
1. FEELING NERVOUS, ANXIOUS, OR ON EDGE: SEVERAL DAYS
7. FEELING AFRAID AS IF SOMETHING AWFUL MIGHT HAPPEN: SEVERAL DAYS
3. WORRYING TOO MUCH ABOUT DIFFERENT THINGS: SEVERAL DAYS
5. BEING SO RESTLESS THAT IT IS HARD TO SIT STILL: SEVERAL DAYS
6. BECOMING EASILY ANNOYED OR IRRITABLE: MORE THAN HALF THE DAYS

## 2019-09-23 ASSESSMENT — PATIENT HEALTH QUESTIONNAIRE - PHQ9
5. POOR APPETITE OR OVEREATING: SEVERAL DAYS
SUM OF ALL RESPONSES TO PHQ QUESTIONS 1-9: 3

## 2019-09-23 NOTE — PATIENT INSTRUCTIONS
Positive journaling, identify successes.  Practice positive self-talk.  Identify emotion, acknowledge without judgment.

## 2019-09-23 NOTE — PROGRESS NOTES
Progress Note    Patient Name: Tracee Cooper  Date: 9/23/19         Service Type: Individual  Video Visit: No     Session Start Time: 11am  Session End Time: 11:45     Session Length: 45    Session #: 7    Attendees: Client attended alone     Treatment Plan Last Reviewed: 7/30/19 (Review by 10/30/19)  CGI: Initial completed  PHQ-9 / ORVILLE-7 : 3/8        DATA  Interactive Complexity: No  Crisis: No       Progress Since Last Session (Related to Symptoms / Goals / Homework):   Symptoms: No change      Homework: Achieved / completed to satisfaction      Episode of Care Goals: Satisfactory progress - ACTION (Actively working towards change); Intervened by reinforcing change plan / affirming steps taken     Current / Ongoing Stressors and Concerns:   Ongoing: Anxiety and panic attacks while driving   Current: Discussed difficulty driving last week, and negative self-talk associated.      Treatment Objective(s) Addressed in This Session:   Patient will use cognitive strategies identified in therapy to challenge anxious thoughts.     Intervention:   CBT: Coached on cognitive distortions, identified how these may influence anxiety levels. Discussed altering self-talk to decrease symptom elevations and increase self-validation.          ASSESSMENT: Current Emotional / Mental Status (status of significant symptoms):   Risk status (Self / Other harm or suicidal ideation)   Patient denies current fears or concerns for personal safety.   Patient denies current or recent suicidal ideation or behaviors.   Patientdenies current or recent homicidal ideation or behaviors.   Patient denies current or recent self injurious behavior or ideation.   Patient denies other safety concerns.   Patient Patient reports there has been no change in risk factors since their last session.     PatientPatient reports there has been no change in protective factors since their last session.     Recommended  that patient call 911 or go to the local ED should there be a change in any of these risk factors.     Appearance:   Appropriate    Eye Contact:   Good    Psychomotor Behavior: Normal    Attitude:   Cooperative    Orientation:   All   Speech    Rate / Production: Normal     Volume:  Normal    Mood:    Anxious  Depressed    Affect:    Appropriate    Thought Content:  Clear    Thought Form:  Coherent  Logical    Insight:    Good      Medication Review:   No changes to current psychiatric medication(s)     Medication Compliance:   Yes     Changes in Health Issues:   None reported     Chemical Use Review:   Substance Use: Chemical use reviewed, no active concerns identified      Tobacco Use: No current tobacco use.      Diagnosis:  1. Generalized anxiety disorder         Collateral Reports Completed:   Not Applicable    PLAN: (Patient Tasks / Therapist Tasks / Other)  HOMEWORK: Positive journaling, identify successes. Practice positive self-talk. Identify emotion, acknowledge without judgment.      Eunice Mixon MA, [facility]                                                          ______________________________________________________________________    Treatment Plan    Patient's Name: Tracee Cooper  YOB: 1970    Date: 7/30/19    Diagnoses:  300.02 (F41.1) Generalized Anxiety Disorder   Rule out Posttraumatic Stress Disorder  Psychosocial & Contextual Factors: Past trauma, children's mental health concerns, work stressors  WHODAS: Completed    Referral / Collaboration:  Referral to another professional/service is not indicated at this time.    Anticipated number of session or this episode of care: 20      MeasurableTreatment Goal(s) related to diagnosis / functional impairment(s)  Goal 1: Patient will decrease anxiety levels as evidence by ORVILLE-7 scores, and client report.    I will know I've met my goal when I have less panic attacks, when I can drive to a new place without feeling anxious or panicked.       Objective #A (Patient Action)    Patient will identify 3 initial signs or symptoms of anxiety.  Status: New - Date: 7/30/19     Intervention(s)  Therapist will assign homework    teach emotional recognition/identification.      Objective #B  Patient will use cognitive strategies identified in therapy to challenge anxious thoughts.  Status: New - Date: 7/30/19     Intervention(s)  Therapist will assign homework    teach CBT techniques to identify and challenge cognitive distortions.    Objective #C  Patient will identify three distraction and diversion activities and use those activities to decrease level of anxiety  .  Status: New - Date: 7/30/19     Intervention(s)  Therapist will assign homework    teach DBT techniques including Mindfulness and Distress Tolerance.        Patient has reviewed and agreed to the above plan.      Eunice Mixon MA, [facility]  9/23/19

## 2019-09-24 ENCOUNTER — THERAPY VISIT (OUTPATIENT)
Dept: PHYSICAL THERAPY | Facility: CLINIC | Age: 49
End: 2019-09-24
Payer: COMMERCIAL

## 2019-09-24 DIAGNOSIS — M54.12 CERVICAL RADICULITIS: ICD-10-CM

## 2019-09-24 DIAGNOSIS — M79.601 RIGHT ARM PAIN: ICD-10-CM

## 2019-09-24 DIAGNOSIS — M54.12 CERVICAL RADICULOPATHY: ICD-10-CM

## 2019-09-24 DIAGNOSIS — M54.2 NECK PAIN: ICD-10-CM

## 2019-09-24 PROCEDURE — 97530 THERAPEUTIC ACTIVITIES: CPT | Mod: GP | Performed by: PHYSICAL THERAPIST

## 2019-09-24 PROCEDURE — 97161 PT EVAL LOW COMPLEX 20 MIN: CPT | Mod: GP | Performed by: PHYSICAL THERAPIST

## 2019-09-24 PROCEDURE — 97110 THERAPEUTIC EXERCISES: CPT | Mod: GP | Performed by: PHYSICAL THERAPIST

## 2019-09-24 ASSESSMENT — ANXIETY QUESTIONNAIRES: GAD7 TOTAL SCORE: 8

## 2019-09-24 NOTE — PROGRESS NOTES
[facility] Initial Evaluation  Subjective:  The history is provided by the patient.   Type of problem:  Cervical spine   Condition occurred with:  Insidious onset. This is a new condition   Problem details: Pt notes 1 month ago that she woke up with symptoms of pain in her neck and symptoms going down RUE. Not sure what precipitated symptoms initially. Currently pt feels neck pain (more so R vs L), and RUE symptoms from shoulder to hand (feels whole arm is painful and numbness/tingling in digits 1-3. Pain worse with walking for long periods of time, working on computer (symptoms worse within 30 min), checking blind spot on R side and keeps pt awake at night, can't sleep on R side. Has had injections recently that have helped a lot, medication, hot/cold packs. Hasn't sought out any other treatment for neck. Pt works in design on a computer..             and reported as 5/10 on pain scale. General health as reported by patient is good. Pertinent medical history includes:  Cancer and high blood pressure.    Surgeries include:  None.  Current medications:  Anti-depressants and high blood pressure medication.   Primary job tasks include:  Computer work.           Patient is Design.                           Objective:  System              Cervical/Thoracic Evaluation    AROM:  AROM Cervical:    Flexion:          50% ROM, 6/10 pain  Extension:       50% ROM, no pain  Rotation:         Left:     Right:  Side Bend:      Left: 100% ROM, no pain     Right:  75% ROM, 8/10 pain at end ROM        Cervical Myotomes:        C4 (shrug):  Left: 5    Right: 5  C5 (Deltoid):  Left: 5-    Right: 5-  C6 (Biceps):  Left: 5-    Right: 5-  C7 (Triceps):  Left: 5-    Right: 4  C8 (Thumb Ext): Left: 5-    Right: 4+  T1 (Intrinsics): Left: 5-    Right: 4    Neural Tension:      Right side:  Radial; Ulnar and Median positive.  Cervical Dermatomes:  normal                                                                       General     ROS    Assessment/Plan:    Patient is a 49 year old female with cervical complaints.    Patient has the following significant findings with corresponding treatment plan.                Diagnosis 1:  Neck pain  Pain -  hot/cold therapy, US, electric stimulation, mechanical traction, manual therapy, splint/taping/bracing/orthotics, self management, education, directional preference exercise and home program  Decreased ROM/flexibility - manual therapy, therapeutic exercise, therapeutic activity and home program  Decreased joint mobility - manual therapy, therapeutic exercise, therapeutic activity and home program  Decreased strength - therapeutic exercise, therapeutic activities and home program  Impaired muscle performance - neuro re-education and home program  Decreased function - therapeutic activities and home program  Impaired posture - neuro re-education, therapeutic activities and home program  Diagnosis 2:  RUE radiculopathy   Pain -  hot/cold therapy, US, electric stimulation, mechanical traction, manual therapy, splint/taping/bracing/orthotics, self management, education, directional preference exercise and home program  Decreased ROM/flexibility - manual therapy, therapeutic exercise, therapeutic activity and home program  Decreased joint mobility - manual therapy, therapeutic exercise, therapeutic activity and home program  Decreased strength - therapeutic exercise, therapeutic activities and home program  Impaired muscle performance - neuro re-education and home program  Decreased function - therapeutic activities and home program  Impaired posture - neuro re-education, therapeutic activities and home program    Therapy Evaluation Codes:   1) History comprised of:   Personal factors that impact the plan of care:      None.    Comorbidity factors that impact the plan of care are:      Cancer and High blood pressure.     Medications impacting care: Anti-depressant and High blood  pressure.  2) Examination of Body Systems comprised of:   Body structures and functions that impact the plan of care:      Cervical spine.   Activity limitations that impact the plan of care are:      Bathing, Cooking, Driving, Dressing, Lifting, Reading/Computer work, Sitting, Working and Sleeping.  3) Clinical presentation characteristics are:   Stable/Uncomplicated.  4) Decision-Making    Low complexity using standardized patient assessment instrument and/or measureable assessment of functional outcome.  Cumulative Therapy Evaluation is: Low complexity.    Previous and current functional limitations:  (See Goal Flow Sheet for this information)    Short term and Long term goals: (See Goal Flow Sheet for this information)     Communication ability:  Patient appears to be able to clearly communicate and understand verbal and written communication and follow directions correctly.  Treatment Explanation - The following has been discussed with the patient:   RX ordered/plan of care  Anticipated outcomes  Possible risks and side effects  This patient would benefit from PT intervention to resume normal activities.   Rehab potential is good.    Frequency:  1 X week, once daily  Duration:  for 6 weeks  Discharge Plan:  Achieve all LTG.  Independent in home treatment program.  Reach maximal therapeutic benefit.    Please refer to the daily flowsheet for treatment today, total treatment time and time spent performing 1:1 timed codes.

## 2019-09-30 ENCOUNTER — OFFICE VISIT (OUTPATIENT)
Dept: PSYCHOLOGY | Facility: CLINIC | Age: 49
End: 2019-09-30
Payer: COMMERCIAL

## 2019-09-30 DIAGNOSIS — F41.1 GENERALIZED ANXIETY DISORDER: Primary | ICD-10-CM

## 2019-09-30 PROCEDURE — 90834 PSYTX W PT 45 MINUTES: CPT | Performed by: COUNSELOR

## 2019-09-30 NOTE — PROGRESS NOTES
Progress Note    Patient Name: Tracee Cooper  Date: 9/30/19         Service Type: Individual  Video Visit: No     Session Start Time: 11am  Session End Time: 11:45     Session Length: 45    Session #: 7    Attendees: Client attended alone     Treatment Plan Last Reviewed: 7/30/19 (Review by 10/30/19)  CGI: Initial completed  PHQ-9 / ORVILLE-7 : ---        DATA  Interactive Complexity: No  Crisis: No       Progress Since Last Session (Related to Symptoms / Goals / Homework):   Symptoms: No change      Homework: Achieved / completed to satisfaction      Episode of Care Goals: Satisfactory progress - ACTION (Actively working towards change); Intervened by reinforcing change plan / affirming steps taken     Current / Ongoing Stressors and Concerns:   Ongoing: Anxiety and panic attacks while driving   Current: Discussed body image and triggers to ineffective eating patterns.       Treatment Objective(s) Addressed in This Session:   Patient will use cognitive strategies identified in therapy to challenge anxious thoughts.     Intervention:   CBT: Offered psychoeducation on judgments and shame cycles that tend to reinforce ineffective eating patterns and negative self-talk. Discussed use of positive affirmations in addition to last session's positive self-talk practice.         ASSESSMENT: Current Emotional / Mental Status (status of significant symptoms):   Risk status (Self / Other harm or suicidal ideation)   Patient denies current fears or concerns for personal safety.   Patient denies current or recent suicidal ideation or behaviors.   Patientdenies current or recent homicidal ideation or behaviors.   Patient denies current or recent self injurious behavior or ideation.   Patient denies other safety concerns.   Patient Patient reports there has been no change in risk factors since their last session.     PatientPatient reports there has been no change in protective factors  "since their last session.     Recommended that patient call 911 or go to the local ED should there be a change in any of these risk factors.     Appearance:   Appropriate    Eye Contact:   Good    Psychomotor Behavior: Normal    Attitude:   Cooperative    Orientation:   All   Speech    Rate / Production: Normal     Volume:  Normal    Mood:    Anxious  Depressed    Affect:    Appropriate    Thought Content:  Clear    Thought Form:  Coherent  Logical    Insight:    Good      Medication Review:   No changes to current psychiatric medication(s)     Medication Compliance:   Yes     Changes in Health Issues:   None reported     Chemical Use Review:   Substance Use: Chemical use reviewed, no active concerns identified      Tobacco Use: No current tobacco use.      Diagnosis:  1. Generalized anxiety disorder         Collateral Reports Completed:   Not Applicable    PLAN: (Patient Tasks / Therapist Tasks / Other)  HOMEWORK: Positive journaling, identify successes. Practice positive self-talk. Identify emotion, acknowledge without judgment. Identify \"old versus new\" thinking versus \"good/bad\" thinking.      Eunice Mixon MA, [facility]                                                          ______________________________________________________________________    Treatment Plan    Patient's Name: Tracee Cooper  YOB: 1970    Date: 7/30/19    Diagnoses:  300.02 (F41.1) Generalized Anxiety Disorder   Rule out Posttraumatic Stress Disorder  Psychosocial & Contextual Factors: Past trauma, children's mental health concerns, work stressors  WHODAS: Completed    Referral / Collaboration:  Referral to another professional/service is not indicated at this time.    Anticipated number of session or this episode of care: 20      MeasurableTreatment Goal(s) related to diagnosis / functional impairment(s)  Goal 1: Patient will decrease anxiety levels as evidence by ORVILLE-7 scores, and client report.    I will know I've met my " goal when I have less panic attacks, when I can drive to a new place without feeling anxious or panicked.      Objective #A (Patient Action)    Patient will identify 3 initial signs or symptoms of anxiety.  Status: New - Date: 7/30/19     Intervention(s)  Therapist will assign homework    teach emotional recognition/identification.      Objective #B  Patient will use cognitive strategies identified in therapy to challenge anxious thoughts.  Status: New - Date: 7/30/19     Intervention(s)  Therapist will assign homework    teach CBT techniques to identify and challenge cognitive distortions.    Objective #C  Patient will identify three distraction and diversion activities and use those activities to decrease level of anxiety  .  Status: New - Date: 7/30/19     Intervention(s)  Therapist will assign homework    teach DBT techniques including Mindfulness and Distress Tolerance.        Patient has reviewed and agreed to the above plan.      Eunice Mixon MA, [facility]  9/30/19

## 2019-10-02 DIAGNOSIS — F41.1 GENERALIZED ANXIETY DISORDER: ICD-10-CM

## 2019-10-02 DIAGNOSIS — I10 ESSENTIAL HYPERTENSION WITH GOAL BLOOD PRESSURE LESS THAN 140/90: ICD-10-CM

## 2019-10-03 NOTE — TELEPHONE ENCOUNTER
Routing refill request to provider for review/approval because:  B/P out of protocol.Candace Bernstein RN

## 2019-10-03 NOTE — TELEPHONE ENCOUNTER
Routing refill request to provider for review/approval because:  Blood pressure elevated. PHQ-9 within protocol. Candace Bernstein RN

## 2019-10-03 NOTE — TELEPHONE ENCOUNTER
"Requested Prescriptions   Pending Prescriptions Disp Refills     amLODIPine (NORVASC) 5 MG tablet [Pharmacy Med Name: AMLODIPINE BESYLATE 5MG TABLETS] 30 tablet 0     Sig: TAKE 1 TABLET(5 MG) BY MOUTH DAILY  Last Written Prescription Date:  09/05/2019 #30 x 0  Last filled 09/05/2019  Last office visit: 8/22/2019 [facility]     Future Office Visit:   Next 5 appointments (look out 90 days)    Oct 08, 2019  1:00 PM CDT  Return Visit with Eunice Mixon [facility] ([facility] [address] [place] [address]  [phone]   Oct 14, 2019 11:00 AM CDT  Return Visit with Eunice Mixon [facility] ([facility] [address] [facility] [address]  [phone]   Oct 30, 2019  1:00 PM CDT  Return Visit with Eunice Mixon [facility] ([facility] [address] [facility] [address]  [phone]                Calcium Channel Blockers Protocol  Failed - 10/2/2019  5:05 PM        Failed - Blood pressure under 140/90 in past 12 months     BP Readings from Last 3 Encounters:   09/17/19 (!) 155/100   09/06/19 134/82   08/22/19 (!) 164/92                 Passed - Recent (12 mo) or future (30 days) visit within the authorizing provider's specialty     Patient has had an office visit with the authorizing provider or a provider within the authorizing providers department within the previous 12 mos or has a future within next 30 days. See \"Patient Info\" tab in inbasket, or \"Choose Columns\" in Meds & Orders section of the refill encounter.              Passed - Medication is active on med list        Passed - Patient is age 18 or older        Passed - No active pregnancy on record        Passed - Normal serum creatinine on file in past 12 months     Recent Labs   Lab Test 06/03/19  1446   CR 0.68             Passed - No positive pregnancy test in past 12 months          "

## 2019-10-03 NOTE — TELEPHONE ENCOUNTER
"Requested Prescriptions   Pending Prescriptions Disp Refills     venlafaxine (EFFEXOR-XR) 150 MG 24 hr capsule [Pharmacy Med Name: VENLAFAXINE ER 150MG CAPSULES] 30 capsule 0     Sig: TAKE 1 CAPSULE(150 MG) BY MOUTH DAILY  Last Written Prescription Date:  09/04/219 #30 x 0  Last filled 09/04/2019 #30 x 0  Last office visit: 8/22/2019 [facility]     Future Office Visit:   Next 5 appointments (look out 90 days)    Oct 08, 2019  1:00 PM CDT  Return Visit with Eunice Mixon [facility] ([facility] [address] [place] [id number]  [phone]   Oct 14, 2019 11:00 AM CDT  Return Visit with Eunice Mixon [facility] ([facility]) [address] [place] [address]  [phone]   Oct 30, 2019  1:00 PM CDT  Return Visit with Eunice Mixon [facility] ([facility]) [address] [address]  [phone]                Serotonin-Norepinephrine Reuptake Inhibitors  Failed - 10/2/2019  5:06 PM        Failed - Blood pressure under 140/90 in past 12 months     BP Readings from Last 3 Encounters:   09/17/19 (!) 155/100   09/06/19 134/82   08/22/19 (!) 164/92                 Passed - Recent (12 mo) or future (30 days) visit within the authorizing provider's specialty     Patient has had an office visit with the authorizing provider or a provider within the authorizing providers department within the previous 12 mos or has a future within next 30 days. See \"Patient Info\" tab in inbasket, or \"Choose Columns\" in Meds & Orders section of the refill encounter.              Passed - Medication is active on med list        Passed - Patient is age 18 or older        Passed - No active pregnancy on record        Passed - Normal serum creatinine on file in past 12 months     Recent Labs   Lab Test 06/03/19  1446   CR 0.68             Passed - No positive pregnancy test in past 12 months    "

## 2019-10-04 ENCOUNTER — HEALTH MAINTENANCE LETTER (OUTPATIENT)
Age: 49
End: 2019-10-04

## 2019-10-04 RX ORDER — VENLAFAXINE HYDROCHLORIDE 150 MG/1
CAPSULE, EXTENDED RELEASE ORAL
Qty: 90 CAPSULE | Refills: 0 | Status: SHIPPED | OUTPATIENT
Start: 2019-10-04 | End: 2020-01-08

## 2019-10-04 RX ORDER — AMLODIPINE BESYLATE 5 MG/1
TABLET ORAL
Qty: 30 TABLET | Refills: 0 | Status: SHIPPED | OUTPATIENT
Start: 2019-10-04 | End: 2019-11-05

## 2019-10-08 ENCOUNTER — OFFICE VISIT (OUTPATIENT)
Dept: PSYCHOLOGY | Facility: CLINIC | Age: 49
End: 2019-10-08
Payer: COMMERCIAL

## 2019-10-08 DIAGNOSIS — F41.1 GENERALIZED ANXIETY DISORDER: Primary | ICD-10-CM

## 2019-10-08 PROCEDURE — 90834 PSYTX W PT 45 MINUTES: CPT | Performed by: COUNSELOR

## 2019-10-08 ASSESSMENT — ANXIETY QUESTIONNAIRES
2. NOT BEING ABLE TO STOP OR CONTROL WORRYING: SEVERAL DAYS
IF YOU CHECKED OFF ANY PROBLEMS ON THIS QUESTIONNAIRE, HOW DIFFICULT HAVE THESE PROBLEMS MADE IT FOR YOU TO DO YOUR WORK, TAKE CARE OF THINGS AT HOME, OR GET ALONG WITH OTHER PEOPLE: SOMEWHAT DIFFICULT
1. FEELING NERVOUS, ANXIOUS, OR ON EDGE: MORE THAN HALF THE DAYS
7. FEELING AFRAID AS IF SOMETHING AWFUL MIGHT HAPPEN: SEVERAL DAYS
5. BEING SO RESTLESS THAT IT IS HARD TO SIT STILL: SEVERAL DAYS
GAD7 TOTAL SCORE: 10
6. BECOMING EASILY ANNOYED OR IRRITABLE: MORE THAN HALF THE DAYS
3. WORRYING TOO MUCH ABOUT DIFFERENT THINGS: MORE THAN HALF THE DAYS

## 2019-10-08 ASSESSMENT — PATIENT HEALTH QUESTIONNAIRE - PHQ9
SUM OF ALL RESPONSES TO PHQ QUESTIONS 1-9: 7
5. POOR APPETITE OR OVEREATING: SEVERAL DAYS

## 2019-10-08 NOTE — PROGRESS NOTES
Progress Note    Patient Name: Tracee Cooper  Date: 10/8/19         Service Type: Individual  Video Visit: No     Session Start Time: 1pm  Session End Time: 1:45     Session Length: 45    Session #: 8    Attendees: Client attended alone     Treatment Plan Last Reviewed: 7/30/19 (Review by 10/30/19)  CGI: Initial completed  PHQ-9 / ORVILLE-7 : 7/        DATA  Interactive Complexity: No  Crisis: No       Progress Since Last Session (Related to Symptoms / Goals / Homework):   Symptoms: No change      Homework: Achieved / completed to satisfaction      Episode of Care Goals: Satisfactory progress - ACTION (Actively working towards change); Intervened by reinforcing change plan / affirming steps taken     Current / Ongoing Stressors and Concerns:   Ongoing: Anxiety and panic attacks while driving   Current: Discussed interpersonal conflict with sister-in-laws that have impacted mood.     Treatment Objective(s) Addressed in This Session:   Patient will use cognitive strategies identified in therapy to challenge anxious thoughts.     Intervention:   CBT: Utilized the Anger [place] to increase insight into secondary emotions to anger. Coached on communication styles, and how to utilize insight into emotions to respond versus react to interpersonal conflict         ASSESSMENT: Current Emotional / Mental Status (status of significant symptoms):   Risk status (Self / Other harm or suicidal ideation)   Patient denies current fears or concerns for personal safety.   Patient denies current or recent suicidal ideation or behaviors.   Patientdenies current or recent homicidal ideation or behaviors.   Patient denies current or recent self injurious behavior or ideation.   Patient denies other safety concerns.   Patient Patient reports there has been no change in risk factors since their last session.     PatientPatient reports there has been no change in protective factors since their last  "session.     Recommended that patient call 911 or go to the local ED should there be a change in any of these risk factors.     Appearance:   Appropriate    Eye Contact:   Good    Psychomotor Behavior: Normal    Attitude:   Cooperative    Orientation:   All   Speech    Rate / Production: Normal     Volume:  Normal    Mood:    Anxious  Depressed    Affect:    Appropriate    Thought Content:  Clear    Thought Form:  Coherent  Logical    Insight:    Good      Medication Review:   No changes to current psychiatric medication(s)     Medication Compliance:   Yes     Changes in Health Issues:   None reported     Chemical Use Review:   Substance Use: Chemical use reviewed, no active concerns identified      Tobacco Use: No current tobacco use.      Diagnosis:  1. Generalized anxiety disorder         Collateral Reports Completed:   Not Applicable    PLAN: (Patient Tasks / Therapist Tasks / Other)  HOMEWORK: Positive journaling, identify successes. Practice positive self-talk. Identify emotion, acknowledge without judgment. Identify \"old versus new\" thinking versus \"good/bad\" thinking.      Eunice Mixon MA, [facility]                                                          ______________________________________________________________________    Treatment Plan    Patient's Name: Tracee Cooper  YOB: 1970    Date: 7/30/19    Diagnoses:  300.02 (F41.1) Generalized Anxiety Disorder   Rule out Posttraumatic Stress Disorder  Psychosocial & Contextual Factors: Past trauma, children's mental health concerns, work stressors  WHODAS: Completed    Referral / Collaboration:  Referral to another professional/service is not indicated at this time.    Anticipated number of session or this episode of care: 20      MeasurableTreatment Goal(s) related to diagnosis / functional impairment(s)  Goal 1: Patient will decrease anxiety levels as evidence by ORVILLE-7 scores, and client report.    I will know I've met my goal when I have " less panic attacks, when I can drive to a new place without feeling anxious or panicked.      Objective #A (Patient Action)    Patient will identify 3 initial signs or symptoms of anxiety.  Status: New - Date: 7/30/19     Intervention(s)  Therapist will assign homework    teach emotional recognition/identification.      Objective #B  Patient will use cognitive strategies identified in therapy to challenge anxious thoughts.  Status: New - Date: 7/30/19     Intervention(s)  Therapist will assign homework    teach CBT techniques to identify and challenge cognitive distortions.    Objective #C  Patient will identify three distraction and diversion activities and use those activities to decrease level of anxiety  .  Status: New - Date: 7/30/19     Intervention(s)  Therapist will assign homework    teach DBT techniques including Mindfulness and Distress Tolerance.        Patient has reviewed and agreed to the above plan.      Eunice Mixon MA, [facility]  10/8/19

## 2019-10-09 ASSESSMENT — ANXIETY QUESTIONNAIRES: GAD7 TOTAL SCORE: 10

## 2019-10-10 DIAGNOSIS — I10 ESSENTIAL HYPERTENSION WITH GOAL BLOOD PRESSURE LESS THAN 140/90: ICD-10-CM

## 2019-10-10 DIAGNOSIS — F41.1 GENERALIZED ANXIETY DISORDER: ICD-10-CM

## 2019-10-14 ENCOUNTER — OFFICE VISIT (OUTPATIENT)
Dept: PSYCHOLOGY | Facility: CLINIC | Age: 49
End: 2019-10-14
Payer: COMMERCIAL

## 2019-10-14 DIAGNOSIS — F41.1 GENERALIZED ANXIETY DISORDER: Primary | ICD-10-CM

## 2019-10-14 PROCEDURE — 90834 PSYTX W PT 45 MINUTES: CPT | Performed by: COUNSELOR

## 2019-10-14 NOTE — TELEPHONE ENCOUNTER
"Requested Prescriptions   Pending Prescriptions Disp Refills     losartan (COZAAR) 25 MG tablet [Pharmacy Med Name: LOSARTAN 25MG TABLETS] 30 tablet 0     Sig: TAKE 1 TABLET(25 MG) BY MOUTH DAILY  Last Written Prescription Date:  08/22/2019 #30 x 0  Last filled 05/22/2019  Last office visit: 8/22/2019 OMAR Kate    Note: Medication is not on the current med list     Future Office Visit:   Next 5 appointments (look out 90 days)    Oct 30, 2019  1:00 PM CDT  Return Visit with Eunice Mixon [facility] ([facility] [address] [place] [id number]  [phone]   Nov 06, 2019  1:00 PM CST  Return Visit with Eunice Mixon [facility] ([facility] [address] [place] [address]  [phone]   Nov 14, 2019  1:30 PM CST  Return Visit with Eunice Mixon [facility] ([facility]) [address] [facility] [address]  [phone]                Angiotensin-II Receptors Failed - 10/10/2019  4:12 AM        Failed - Last blood pressure under 140/90 in past 12 months     BP Readings from Last 3 Encounters:   09/17/19 (!) 155/100   09/06/19 134/82   08/22/19 (!) 164/92                 Failed - Medication is active on med list        Passed - Recent (12 mo) or future (30 days) visit within the authorizing provider's specialty     Patient has had an office visit with the authorizing provider or a provider within the authorizing providers department within the previous 12 mos or has a future within next 30 days. See \"Patient Info\" tab in inbasket, or \"Choose Columns\" in Meds & Orders section of the refill encounter.              Passed - Patient is age 18 or older        Passed - No active pregnancy on record        Passed - Normal serum creatinine on file in past 12 months     Recent Labs   Lab Test 06/03/19  1446   CR 0.68             Passed - Normal serum potassium on file in past 12 " "months     Recent Labs   Lab Test 06/03/19  1446   POTASSIUM 3.4                    Passed - No positive pregnancy test in past 12 months        hydrochlorothiazide (HYDRODIURIL) 25 MG tablet [Pharmacy Med Name: HYDROCHLOROTHIAZIDE 25MG TABLETS] 30 tablet 0     Sig: TAKE 1 TABLET(25 MG) BY MOUTH DAILY  Last Written Prescription Date:  05/15/2019 #30 x 0  Last filled 05/15/2019  Last office visit: 8/22/2019 OMAR Love    Note: Medication is not on the current med list     Future Office Visit:   Next 5 appointments (look out 90 days)    Oct 30, 2019  1:00 PM CDT  Return Visit with [facility]ice [facility] ([facility]) [address] [place] [address]  [place] [address]  [phone]   Nov 06, 2019  1:00 PM CST  [facility] ([facility] [place]) [address] [place] [address]  [phone]   Nov 14, 2019  1:30 PM CST  Return Visit with Eunice Mixon [facility] ([facility]) [address] [place] [address]  [place] [address]  [phone]                Diuretics (Including Combos) Protocol Failed - 10/10/2019  4:12 AM        Failed - Blood pressure under 140/90 in past 12 months     BP Readings from Last 3 Encounters:   09/17/19 (!) 155/100   09/06/19 134/82   08/22/19 (!) 164/92                 Failed - Medication is active on med list        Failed - Normal serum sodium on file in past 12 months     Recent Labs   Lab Test 06/03/19  1446   *              Passed - Recent (12 mo) or future (30 days) visit within the authorizing provider's specialty     Patient has had an office visit with the authorizing provider or a provider within the authorizing providers department within the previous 12 mos or has a future within next 30 days. See \"Patient Info\" tab in inbasket, or \"Choose Columns\" in Meds & Orders section of the refill encounter.              Passed - Patient is age 18 or " "older        Passed - No active pregancy on record        Passed - Normal serum creatinine on file in past 12 months     Recent Labs   Lab Test 06/03/19  1446   CR 0.68              Passed - Normal serum potassium on file in past 12 months     Recent Labs   Lab Test 06/03/19  1446   POTASSIUM 3.4                    Passed - No positive pregnancy test in past 12 months        losartan (COZAAR) 50 MG tablet [Pharmacy Med Name: LOSARTAN 50MG TABLETS] 30 tablet 0     Sig: TAKE 1 TABLET(50 MG) BY MOUTH DAILY  Last Written Prescription Date:  06/04/2019 #30 x 0  Last filled 06/04/2019 #30 x 0  Last office visit: 8/22/2019 OMAR Love    Note: Medication is not on the current med list     Future Office Visit:   Next 5 appointments (look out 90 days)    Oct 30, 2019  1:00 PM CDT  Return Visit with Eunice Mixon [address] [address]  [phone]   Nov 06, 2019  1:00 PM CST  Return Visit with Eunice Mixon [address] [address]  [phone]   Nov 14, 2019  1:30 PM CST  Return Visit with Eunice Mixon [facility] ([address] [address]  [phone]                Angiotensin-II Receptors Failed - 10/10/2019  4:12 AM        Failed - Last blood pressure under 140/90 in past 12 months     BP Readings from Last 3 Encounters:   09/17/19 (!) 155/100   09/06/19 134/82   08/22/19 (!) 164/92                 Failed - Medication is active on med list        Passed - Recent (12 mo) or future (30 days) visit within the authorizing provider's specialty     Patient has had an office visit with the authorizing provider or a provider within the authorizing providers department within the previous 12 mos or has a future within next 30 days. See \"Patient Info\" tab in inbasket, or \"Choose Columns\" in Meds " & Orders section of the refill encounter.              Passed - Patient is age 18 or older        Passed - No active pregnancy on record        Passed - Normal serum creatinine on file in past 12 months     Recent Labs   Lab Test 06/03/19  1446   CR 0.68             Passed - Normal serum potassium on file in past 12 months     Recent Labs   Lab Test 06/03/19  1446   POTASSIUM 3.4                    Passed - No positive pregnancy test in past 12 months

## 2019-10-15 ENCOUNTER — THERAPY VISIT (OUTPATIENT)
Dept: PHYSICAL THERAPY | Facility: CLINIC | Age: 49
End: 2019-10-15
Payer: COMMERCIAL

## 2019-10-15 ENCOUNTER — ALLIED HEALTH/NURSE VISIT (OUTPATIENT)
Dept: FAMILY MEDICINE | Facility: CLINIC | Age: 49
End: 2019-10-15
Payer: COMMERCIAL

## 2019-10-15 VITALS — SYSTOLIC BLOOD PRESSURE: 142 MMHG | DIASTOLIC BLOOD PRESSURE: 90 MMHG

## 2019-10-15 DIAGNOSIS — M54.2 NECK PAIN: ICD-10-CM

## 2019-10-15 DIAGNOSIS — I10 ESSENTIAL HYPERTENSION WITH GOAL BLOOD PRESSURE LESS THAN 140/90: Primary | ICD-10-CM

## 2019-10-15 DIAGNOSIS — M54.12 CERVICAL RADICULITIS: ICD-10-CM

## 2019-10-15 PROCEDURE — 97112 NEUROMUSCULAR REEDUCATION: CPT | Mod: GP | Performed by: PHYSICAL THERAPIST

## 2019-10-15 PROCEDURE — 97530 THERAPEUTIC ACTIVITIES: CPT | Mod: GP | Performed by: PHYSICAL THERAPIST

## 2019-10-15 PROCEDURE — 97110 THERAPEUTIC EXERCISES: CPT | Mod: GP | Performed by: PHYSICAL THERAPIST

## 2019-10-15 PROCEDURE — 99207 ZZC NO CHARGE NURSE ONLY: CPT

## 2019-10-15 RX ORDER — LOSARTAN POTASSIUM 25 MG/1
TABLET ORAL
Qty: 30 TABLET | Refills: 0 | OUTPATIENT
Start: 2019-10-15

## 2019-10-15 RX ORDER — FLUOXETINE 20 MG/1
TABLET, FILM COATED ORAL
Qty: 30 TABLET | Refills: 0 | OUTPATIENT
Start: 2019-10-15

## 2019-10-15 RX ORDER — LOSARTAN POTASSIUM 50 MG/1
TABLET ORAL
Qty: 30 TABLET | Refills: 0 | OUTPATIENT
Start: 2019-10-15

## 2019-10-15 RX ORDER — HYDROCHLOROTHIAZIDE 25 MG/1
TABLET ORAL
Qty: 30 TABLET | Refills: 0 | OUTPATIENT
Start: 2019-10-15

## 2019-10-15 NOTE — PROGRESS NOTES
SUBJECTIVE:  Tracee Cooper is a 49 year old female who presents for a follow up evaluation of her hypertension.    The reason for the visit is:  Has been high     Patient is taking medication as prescribed  Patient is tolerating medications well.  Patient is monitoring Blood Pressure at home.  Average readings if yes are 140/98    Current complaints: none      Current Outpatient Medications   Medication     ALPRAZolam (XANAX) 1 MG tablet     amLODIPine (NORVASC) 5 MG tablet     cyclobenzaprine (FLEXERIL) 10 MG tablet     gabapentin (NEURONTIN) 300 MG capsule     predniSONE (DELTASONE) 20 MG tablet     venlafaxine (EFFEXOR-XR) 150 MG 24 hr capsule     No current facility-administered medications for this visit.        No Known Allergies      OBJECTIVE:  Please get a blood pressure AND a pulse.  A height is also needed if has not been done in the past year.    BP (!) 142/90 (BP Location: Left arm, Patient Position: Chair, Cuff Size: Adult Large)     Vitals as recorded, a large cuff was used.    ASSESSMENT:    Is the HYPERTENSION goal on the problem list? Yes  Patient Active Problem List   Diagnosis     H/O Malignant melanoma     Anemia     Hirsutism     CARDIOVASCULAR SCREENING; LDL GOAL LESS THAN 160     Menorrhagia     Generalized anxiety disorder     Chronic diarrhea     Excessive bleeding in premenopausal period     Essential hypertension with goal blood pressure less than 140/90     Neck pain     Cervical radiculitis       Plan:  The patient's blood pressure is less than documented goal. The patient will be discharged home. CC: this note to the patient's primary provider. no    The patient s blood pressure is higher than goal but is less than 180 systolically AND less that 110 diastolically. The patient will be discharged home.  A telephone encounter will be created with this note and sent to the patient's primary provider for action. yes    The patient's blood pressure is above 180 systolically OR is above  110 diastolically. The primary provider, RN, or an available provider will be consulted for immediate action. Keep the patient here for appropriate urgent action.

## 2019-10-18 ENCOUNTER — MYC REFILL (OUTPATIENT)
Dept: FAMILY MEDICINE | Facility: CLINIC | Age: 49
End: 2019-10-18

## 2019-10-18 DIAGNOSIS — F41.1 GENERALIZED ANXIETY DISORDER: ICD-10-CM

## 2019-10-18 RX ORDER — ALPRAZOLAM 1 MG
1 TABLET ORAL 3 TIMES DAILY PRN
Qty: 20 TABLET | Refills: 0 | Status: SHIPPED | OUTPATIENT
Start: 2019-10-18 | End: 2019-12-03

## 2019-10-23 ENCOUNTER — THERAPY VISIT (OUTPATIENT)
Dept: PHYSICAL THERAPY | Facility: CLINIC | Age: 49
End: 2019-10-23
Payer: COMMERCIAL

## 2019-10-23 DIAGNOSIS — M54.2 NECK PAIN: ICD-10-CM

## 2019-10-23 DIAGNOSIS — M54.12 CERVICAL RADICULITIS: ICD-10-CM

## 2019-10-23 PROCEDURE — 97110 THERAPEUTIC EXERCISES: CPT | Mod: GP | Performed by: PHYSICAL THERAPIST

## 2019-10-23 PROCEDURE — 97112 NEUROMUSCULAR REEDUCATION: CPT | Mod: GP | Performed by: PHYSICAL THERAPIST

## 2019-10-23 PROCEDURE — 97140 MANUAL THERAPY 1/> REGIONS: CPT | Mod: GP | Performed by: PHYSICAL THERAPIST

## 2019-10-30 ENCOUNTER — OFFICE VISIT (OUTPATIENT)
Dept: PSYCHOLOGY | Facility: CLINIC | Age: 49
End: 2019-10-30
Payer: COMMERCIAL

## 2019-10-30 DIAGNOSIS — F41.1 GENERALIZED ANXIETY DISORDER: Primary | ICD-10-CM

## 2019-10-30 PROCEDURE — 90834 PSYTX W PT 45 MINUTES: CPT | Performed by: COUNSELOR

## 2019-10-30 ASSESSMENT — ANXIETY QUESTIONNAIRES
7. FEELING AFRAID AS IF SOMETHING AWFUL MIGHT HAPPEN: SEVERAL DAYS
3. WORRYING TOO MUCH ABOUT DIFFERENT THINGS: SEVERAL DAYS
5. BEING SO RESTLESS THAT IT IS HARD TO SIT STILL: NOT AT ALL
GAD7 TOTAL SCORE: 7
6. BECOMING EASILY ANNOYED OR IRRITABLE: SEVERAL DAYS
2. NOT BEING ABLE TO STOP OR CONTROL WORRYING: MORE THAN HALF THE DAYS
1. FEELING NERVOUS, ANXIOUS, OR ON EDGE: SEVERAL DAYS
IF YOU CHECKED OFF ANY PROBLEMS ON THIS QUESTIONNAIRE, HOW DIFFICULT HAVE THESE PROBLEMS MADE IT FOR YOU TO DO YOUR WORK, TAKE CARE OF THINGS AT HOME, OR GET ALONG WITH OTHER PEOPLE: SOMEWHAT DIFFICULT

## 2019-10-30 ASSESSMENT — PATIENT HEALTH QUESTIONNAIRE - PHQ9
5. POOR APPETITE OR OVEREATING: SEVERAL DAYS
SUM OF ALL RESPONSES TO PHQ QUESTIONS 1-9: 4

## 2019-10-30 NOTE — PROGRESS NOTES
Progress Note    Patient Name: Tracee Cooper  Date: 10/30/19         Service Type: Individual  Video Visit: No     Session Start Time: 1pm  Session End Time: 1:45     Session Length: 45    Session #: 10    Attendees: Client attended alone     Treatment Plan Last Reviewed: 7/30/19 (Review by 10/30/19)  CGI: Initial completed  PHQ-9 / ORVILLE-7 : 4/7        DATA  Interactive Complexity: No  Crisis: No       Progress Since Last Session (Related to Symptoms / Goals / Homework):   Symptoms: No change      Homework: Achieved / completed to satisfaction      Episode of Care Goals: Satisfactory progress - ACTION (Actively working towards change); Intervened by reinforcing change plan / affirming steps taken     Current / Ongoing Stressors and Concerns:   Ongoing: Anxiety and panic attacks while driving   Current: Discussed anxiety around work stressors, and some accomplishments that have boost confidence. Identified negative self-talk and self-esteem that interferes with work and personal life.     Treatment Objective(s) Addressed in This Session:   Patient will use cognitive strategies identified in therapy to challenge anxious thoughts.     Intervention:   CBT: Reinforced vulnerability of trying something new, and how she effectively managed stress in doing so. Identified negative self-talk and modeled reframe to reduce anxiety and improve self-esteem         ASSESSMENT: Current Emotional / Mental Status (status of significant symptoms):   Risk status (Self / Other harm or suicidal ideation)   Patient denies current fears or concerns for personal safety.   Patient denies current or recent suicidal ideation or behaviors.   Patientdenies current or recent homicidal ideation or behaviors.   Patient denies current or recent self injurious behavior or ideation.   Patient denies other safety concerns.   Patient Patient reports there has been no change in risk factors since their last  "session.     PatientPatient reports there has been no change in protective factors since their last session.     Recommended that patient call 911 or go to the local ED should there be a change in any of these risk factors.     Appearance:   Appropriate    Eye Contact:   Good    Psychomotor Behavior: Normal    Attitude:   Cooperative    Orientation:   All   Speech    Rate / Production: Normal     Volume:  Normal    Mood:    Anxious  Depressed    Affect:    Appropriate    Thought Content:  Clear    Thought Form:  Coherent  Logical    Insight:    Good      Medication Review:   No changes to current psychiatric medication(s)     Medication Compliance:   Yes     Changes in Health Issues:   None reported     Chemical Use Review:   Substance Use: Chemical use reviewed, no active concerns identified      Tobacco Use: No current tobacco use.      Diagnosis:  1. Generalized anxiety disorder         Collateral Reports Completed:   Not Applicable    PLAN: (Patient Tasks / Therapist Tasks / Other)  HOMEWORK: Positive journaling, identify successes. Practice positive self-talk. Identify emotion, acknowledge without judgment. Identify \"old versus new\" thinking versus \"good/bad\" thinking.      Eunice Mixon MA, [facility]                                                          ______________________________________________________________________    Treatment Plan    Patient's Name: Tracee Cooper  YOB: 1970    Date: 7/30/19    Diagnoses:  300.02 (F41.1) Generalized Anxiety Disorder   Rule out Posttraumatic Stress Disorder  Psychosocial & Contextual Factors: Past trauma, children's mental health concerns, work stressors  WHODAS: Completed    Referral / Collaboration:  Referral to another professional/service is not indicated at this time.    Anticipated number of session or this episode of care: 20      MeasurableTreatment Goal(s) related to diagnosis / functional impairment(s)  Goal 1: Patient will decrease anxiety " levels as evidence by ORVILLE-7 scores, and client report.    I will know I've met my goal when I have less panic attacks, when I can drive to a new place without feeling anxious or panicked.      Objective #A (Patient Action)    Patient will identify 3 initial signs or symptoms of anxiety.  Status: New - Date: 7/30/19     Intervention(s)  Therapist will assign homework    teach emotional recognition/identification.      Objective #B  Patient will use cognitive strategies identified in therapy to challenge anxious thoughts.  Status: New - Date: 7/30/19     Intervention(s)  Therapist will assign homework    teach CBT techniques to identify and challenge cognitive distortions.    Objective #C  Patient will identify three distraction and diversion activities and use those activities to decrease level of anxiety  .  Status: New - Date: 7/30/19     Intervention(s)  Therapist will assign homework    teach DBT techniques including Mindfulness and Distress Tolerance.        Patient has reviewed and agreed to the above plan.      Eunice Mixon MA, [facility]  10/30/19

## 2019-10-31 ASSESSMENT — ANXIETY QUESTIONNAIRES: GAD7 TOTAL SCORE: 7

## 2019-11-05 DIAGNOSIS — I10 ESSENTIAL HYPERTENSION WITH GOAL BLOOD PRESSURE LESS THAN 140/90: ICD-10-CM

## 2019-11-06 ENCOUNTER — OFFICE VISIT (OUTPATIENT)
Dept: PSYCHOLOGY | Facility: CLINIC | Age: 49
End: 2019-11-06
Payer: COMMERCIAL

## 2019-11-06 DIAGNOSIS — F41.1 GENERALIZED ANXIETY DISORDER: Primary | ICD-10-CM

## 2019-11-06 PROCEDURE — 90834 PSYTX W PT 45 MINUTES: CPT | Performed by: COUNSELOR

## 2019-11-06 RX ORDER — AMLODIPINE BESYLATE 5 MG/1
TABLET ORAL
Qty: 30 TABLET | Refills: 0 | Status: SHIPPED | OUTPATIENT
Start: 2019-11-06 | End: 2019-11-14 | Stop reason: DRUGHIGH

## 2019-11-06 NOTE — TELEPHONE ENCOUNTER
"Requested Prescriptions   Pending Prescriptions Disp Refills     amLODIPine (NORVASC) 5 MG tablet [Pharmacy Med Name: AMLODIPINE BESYLATE 5MG TABLETS]  Last Written Prescription Date:  10/4/19  Last Fill Quantity: 30,  # refills: 0   Last office visit: 10/15/2019 with prescribing provider:  rachel   Future Office Visit:   Next 5 appointments (look out 90 days)    Nov 06, 2019  1:00 PM CST  Return Visit with Eunice Mixon [facility] [address] [place] [address]  [phone]   Nov 14, 2019  1:30 PM CST  Return Visit with Eunice Mixon [facility] [address] [place] [address]  [phone]   Dec 02, 2019  2:00 PM CST  Return Visit with Eunice Mixon [facility] [address] [facility] [address]  [phone]          30 tablet 0     Sig: TAKE 1 TABLET(5 MG) BY MOUTH DAILY       Calcium Channel Blockers Protocol  Failed - 11/5/2019  4:12 AM        Failed - Blood pressure under 140/90 in past 12 months     BP Readings from Last 3 Encounters:   10/15/19 (!) 142/90   09/17/19 (!) 155/100   09/06/19 134/82                 Passed - Recent (12 mo) or future (30 days) visit within the authorizing provider's specialty     Patient has had an office visit with the authorizing provider or a provider within the authorizing providers department within the previous 12 mos or has a future within next 30 days. See \"Patient Info\" tab in inbasket, or \"Choose Columns\" in Meds & Orders section of the refill encounter.              Passed - Medication is active on med list        Passed - Patient is age 18 or older        Passed - No active pregnancy on record        Passed - Normal serum creatinine on file in past 12 months     Recent Labs   Lab Test 06/03/19  1446   CR 0.68             Passed - No positive pregnancy test in past 12 months    "

## 2019-11-06 NOTE — TELEPHONE ENCOUNTER
Medication is being filled for 1 time refill only due to:  Patient needs to be seen because due for clinic visit 3-4 weeks after 8.22.19 office visit.  Merna Daivs RN

## 2019-11-06 NOTE — PROGRESS NOTES
Progress Note    Patient Name: Tracee Cooper  Date: 11/6/19         Service Type: Individual  Video Visit: No     Session Start Time: 1pm  Session End Time: 1:45     Session Length: 45    Session #: 10    Attendees: Client attended alone     Treatment Plan Last Reviewed: 7/30/19 (Review by 10/30/19)  CGI: Initial completed  PHQ-9 / ORVILLE-7 : 4/7        DATA  Interactive Complexity: No  Crisis: No       Progress Since Last Session (Related to Symptoms / Goals / Homework):   Symptoms: No change      Homework: Achieved / completed to satisfaction      Episode of Care Goals: Satisfactory progress - ACTION (Actively working towards change); Intervened by reinforcing change plan / affirming steps taken     Current / Ongoing Stressors and Concerns:   Ongoing: Anxiety and panic attacks while driving   Current: Discussed anxiety around work stressors, and some accomplishments that have boost confidence. Identified negative self-talk and self-esteem that interferes with work and personal life.     Treatment Objective(s) Addressed in This Session:   Patient will use cognitive strategies identified in therapy to challenge anxious thoughts.     Intervention:   CBT: Reinforced effective practice of exposure and One Thing at a Time. Offered psychoeducation on panic and anxiety. Discussed progress and continued areas of focus.         ASSESSMENT: Current Emotional / Mental Status (status of significant symptoms):   Risk status (Self / Other harm or suicidal ideation)   Patient denies current fears or concerns for personal safety.   Patient denies current or recent suicidal ideation or behaviors.   Patientdenies current or recent homicidal ideation or behaviors.   Patient denies current or recent self injurious behavior or ideation.   Patient denies other safety concerns.   Patient Patient reports there has been no change in risk factors since their last session.     PatientPatient  reports there has been no change in protective factors since their last session.     Recommended that patient call 911 or go to the local ED should there be a change in any of these risk factors.     Appearance:   Appropriate    Eye Contact:   Good    Psychomotor Behavior: Normal    Attitude:   Cooperative    Orientation:   All   Speech    Rate / Production: Normal     Volume:  Normal    Mood:    Anxious  Depressed    Affect:    Appropriate    Thought Content:  Clear    Thought Form:  Coherent  Logical    Insight:    Good      Medication Review:   No changes to current psychiatric medication(s)     Medication Compliance:   Yes     Changes in Health Issues:   None reported     Chemical Use Review:   Substance Use: Chemical use reviewed, no active concerns identified      Tobacco Use: No current tobacco use.      Diagnosis:  1. 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     Practice extended driving and night driving to expose to these triggers.       Eunice Mixon MA, [facility]                                                          ______________________________________________________________________    Treatment Plan    Patient's Name: Tracee Cooper  YOB: 1970    Date: 7/30/19    Diagnoses:  300.02 (F41.1) Generalized Anxiety Disorder   Rule out Posttraumatic Stress Disorder  Psychosocial & Contextual Factors: Past trauma, children's mental health concerns, work stressors  WHODAS: Completed    Referral / Collaboration:  Referral to another professional/service is not indicated at this time.    Anticipated number of session or this episode of care: 20      MeasurableTreatment Goal(s) related to diagnosis / functional impairment(s)  Goal 1: Patient will decrease anxiety levels as evidence by  ORVILLE-7 scores, and client report.    I will know I've met my goal when I have less panic attacks, when I can drive to a new place without feeling anxious or panicked.      Objective #A (Patient Action)    Patient will identify 3 initial signs or symptoms of anxiety.  Status: New - Date: 7/30/19     Intervention(s)  Therapist will assign homework    teach emotional recognition/identification.      Objective #B  Patient will use cognitive strategies identified in therapy to challenge anxious thoughts.  Status: New - Date: 7/30/19     Intervention(s)  Therapist will assign homework    teach CBT techniques to identify and challenge cognitive distortions.    Objective #C  Patient will identify three distraction and diversion activities and use those activities to decrease level of anxiety  .  Status: New - Date: 7/30/19     Intervention(s)  Therapist will assign homework    teach DBT techniques including Mindfulness and Distress Tolerance.        Patient has reviewed and agreed to the above plan.      Eunice Mixon MA, [facility]  11/6/19

## 2019-11-13 ENCOUNTER — MYC REFILL (OUTPATIENT)
Dept: FAMILY MEDICINE | Facility: CLINIC | Age: 49
End: 2019-11-13

## 2019-11-13 DIAGNOSIS — I10 ESSENTIAL HYPERTENSION WITH GOAL BLOOD PRESSURE LESS THAN 140/90: ICD-10-CM

## 2019-11-13 RX ORDER — AMLODIPINE BESYLATE 5 MG/1
TABLET ORAL
Qty: 30 TABLET | Refills: 0 | Status: CANCELLED | OUTPATIENT
Start: 2019-11-13

## 2019-11-14 ENCOUNTER — OFFICE VISIT (OUTPATIENT)
Dept: PSYCHOLOGY | Facility: CLINIC | Age: 49
End: 2019-11-14
Payer: COMMERCIAL

## 2019-11-14 DIAGNOSIS — F41.1 GENERALIZED ANXIETY DISORDER: Primary | ICD-10-CM

## 2019-11-14 PROCEDURE — 90834 PSYTX W PT 45 MINUTES: CPT | Performed by: COUNSELOR

## 2019-11-14 RX ORDER — AMLODIPINE BESYLATE 10 MG/1
10 TABLET ORAL DAILY
Qty: 90 TABLET | Refills: 0 | Status: SHIPPED | OUTPATIENT
Start: 2019-11-14 | End: 2020-01-16

## 2019-11-14 ASSESSMENT — ANXIETY QUESTIONNAIRES
5. BEING SO RESTLESS THAT IT IS HARD TO SIT STILL: NOT AT ALL
IF YOU CHECKED OFF ANY PROBLEMS ON THIS QUESTIONNAIRE, HOW DIFFICULT HAVE THESE PROBLEMS MADE IT FOR YOU TO DO YOUR WORK, TAKE CARE OF THINGS AT HOME, OR GET ALONG WITH OTHER PEOPLE: SOMEWHAT DIFFICULT
6. BECOMING EASILY ANNOYED OR IRRITABLE: SEVERAL DAYS
3. WORRYING TOO MUCH ABOUT DIFFERENT THINGS: MORE THAN HALF THE DAYS
7. FEELING AFRAID AS IF SOMETHING AWFUL MIGHT HAPPEN: SEVERAL DAYS
2. NOT BEING ABLE TO STOP OR CONTROL WORRYING: SEVERAL DAYS
GAD7 TOTAL SCORE: 7
1. FEELING NERVOUS, ANXIOUS, OR ON EDGE: SEVERAL DAYS

## 2019-11-14 ASSESSMENT — PATIENT HEALTH QUESTIONNAIRE - PHQ9
SUM OF ALL RESPONSES TO PHQ QUESTIONS 1-9: 2
5. POOR APPETITE OR OVEREATING: SEVERAL DAYS

## 2019-11-14 NOTE — PROGRESS NOTES
Progress Note    Patient Name: Tracee Cooper  Date: 11/14/19         Service Type: Individual  Video Visit: No     Session Start Time: 1:31pm  Session End Time: 2:15     Session Length: 44    Session #: 11    Attendees: Client attended alone     Treatment Plan Last Reviewed: 7/30/19 (Review by 10/30/19)  CGI: Initial completed  PHQ-9 / ORVILLE-7 : 2/7        DATA  Interactive Complexity: No  Crisis: No       Progress Since Last Session (Related to Symptoms / Goals / Homework):   Symptoms: No change      Homework: Partially completed      Episode of Care Goals: Satisfactory progress - ACTION (Actively working towards change); Intervened by reinforcing change plan / affirming steps taken     Current / Ongoing Stressors and Concerns:   Ongoing: Anxiety and panic attacks while driving   Current: Discussed anxiety and panic around driving, including progress and continued challenges.     Treatment Objective(s) Addressed in This Session:   Patient will use cognitive strategies identified in therapy to challenge anxious thoughts.     Intervention:   CBT: Reinforced effective practice of exposure, cognitive challenges, and body relaxation. Discussed continued ways in which to generalize these skills to manage anxiety and panic.         ASSESSMENT: Current Emotional / Mental Status (status of significant symptoms):   Risk status (Self / Other harm or suicidal ideation)   Patient denies current fears or concerns for personal safety.   Patient denies current or recent suicidal ideation or behaviors.   Patientdenies current or recent homicidal ideation or behaviors.   Patient denies current or recent self injurious behavior or ideation.   Patient denies other safety concerns.   Patient Patient reports there has been no change in risk factors since their last session.     PatientPatient reports there has been no change in protective factors since their last session.     Recommended that  patient call 911 or go to the local ED should there be a change in any of these risk factors.     Appearance:   Appropriate    Eye Contact:   Good    Psychomotor Behavior: Normal    Attitude:   Cooperative    Orientation:   All   Speech    Rate / Production: Normal     Volume:  Normal    Mood:    Anxious  Depressed    Affect:    Appropriate    Thought Content:  Clear    Thought Form:  Coherent  Logical    Insight:    Good      Medication Review:   No changes to current psychiatric medication(s)     Medication Compliance:   Yes     Changes in Health Issues:   None reported     Chemical Use Review:   Substance Use: Chemical use reviewed, no active concerns identified      Tobacco Use: No current tobacco use.      Diagnosis:  1. Generalized anxiety disorder         Collateral Reports Completed:   Not Applicable    PLAN: (Patient Tasks / Therapist Tasks / Other)  HOMEWORK: Practice cognitive reframing, encouragement, body relaxation, and distraction.    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     Practice extended driving and night driving to expose to these triggers.       Eunice Mixon MA, [facility]                                                          ______________________________________________________________________    Treatment Plan    Patient's Name: Tracee Cooper  YOB: 1970    Date: 7/30/19    Diagnoses:  300.02 (F41.1) Generalized Anxiety Disorder   Rule out Posttraumatic Stress Disorder  Psychosocial & Contextual Factors: Past trauma, children's mental health concerns, work stressors  WHODAS: Completed    Referral / Collaboration:  Referral to another professional/service is not indicated at this time.    Anticipated number of session or this episode of care: 20      MeasurableTreatment Goal(s) related to diagnosis / functional impairment(s)  Goal 1: Patient will decrease anxiety levels as  evidence by ORVILLE-7 scores, and client report.    I will know I've met my goal when I have less panic attacks, when I can drive to a new place without feeling anxious or panicked.      Objective #A (Patient Action)    Patient will identify 3 initial signs or symptoms of anxiety.  Status: New - Date: 7/30/19     Intervention(s)  Therapist will assign homework    teach emotional recognition/identification.      Objective #B  Patient will use cognitive strategies identified in therapy to challenge anxious thoughts.  Status: New - Date: 7/30/19     Intervention(s)  Therapist will assign homework    teach CBT techniques to identify and challenge cognitive distortions.    Objective #C  Patient will identify three distraction and diversion activities and use those activities to decrease level of anxiety  .  Status: New - Date: 7/30/19     Intervention(s)  Therapist will assign homework    teach DBT techniques including Mindfulness and Distress Tolerance.        Patient has reviewed and agreed to the above plan.      Eunice Mixon MA, [facility]  11/14/19

## 2019-11-15 ASSESSMENT — ANXIETY QUESTIONNAIRES: GAD7 TOTAL SCORE: 7

## 2019-12-02 ENCOUNTER — OFFICE VISIT (OUTPATIENT)
Dept: PSYCHOLOGY | Facility: CLINIC | Age: 49
End: 2019-12-02
Payer: COMMERCIAL

## 2019-12-02 DIAGNOSIS — F41.1 GENERALIZED ANXIETY DISORDER: Primary | ICD-10-CM

## 2019-12-02 PROCEDURE — 90834 PSYTX W PT 45 MINUTES: CPT | Performed by: COUNSELOR

## 2019-12-02 ASSESSMENT — ANXIETY QUESTIONNAIRES
GAD7 TOTAL SCORE: 5
6. BECOMING EASILY ANNOYED OR IRRITABLE: SEVERAL DAYS
7. FEELING AFRAID AS IF SOMETHING AWFUL MIGHT HAPPEN: SEVERAL DAYS
1. FEELING NERVOUS, ANXIOUS, OR ON EDGE: SEVERAL DAYS
3. WORRYING TOO MUCH ABOUT DIFFERENT THINGS: SEVERAL DAYS
5. BEING SO RESTLESS THAT IT IS HARD TO SIT STILL: NOT AT ALL
2. NOT BEING ABLE TO STOP OR CONTROL WORRYING: SEVERAL DAYS
IF YOU CHECKED OFF ANY PROBLEMS ON THIS QUESTIONNAIRE, HOW DIFFICULT HAVE THESE PROBLEMS MADE IT FOR YOU TO DO YOUR WORK, TAKE CARE OF THINGS AT HOME, OR GET ALONG WITH OTHER PEOPLE: SOMEWHAT DIFFICULT

## 2019-12-02 ASSESSMENT — PATIENT HEALTH QUESTIONNAIRE - PHQ9
5. POOR APPETITE OR OVEREATING: NOT AT ALL
SUM OF ALL RESPONSES TO PHQ QUESTIONS 1-9: 3

## 2019-12-02 NOTE — PROGRESS NOTES
Progress Note    Patient Name: Tracee Cooper  Date: 12/2/19         Service Type: Individual  Video Visit: No     Session Start Time: 2pm  Session End Time: 2:45     Session Length: 45    Session #: 12    Attendees: Client attended alone     Treatment Plan Last Reviewed: 7/30/19 (Review by 10/30/19)  CGI: Initial completed  PHQ-9 / ORVILLE-7 : 3/5        DATA  Interactive Complexity: No  Crisis: No       Progress Since Last Session (Related to Symptoms / Goals / Homework):   Symptoms: Improving      Homework: Achieved / completed to satisfaction      Episode of Care Goals: Satisfactory progress - ACTION (Actively working towards change); Intervened by reinforcing change plan / affirming steps taken     Current / Ongoing Stressors and Concerns:   Ongoing: Anxiety and panic attacks while driving   Current: Discussed being able to drive her son solo in the vehicle, which is the highest step in her fear ladder. Notes starting a new eating plan.     Treatment Objective(s) Addressed in This Session:   Patient will use cognitive strategies identified in therapy to challenge anxious thoughts.     Intervention:   CBT: Reinforced effective practice of exposure, cognitive challenges, and body relaxation. Discussed continued ways in which to generalize these skills to manage anxiety and panic. Discussed warning signs to disordered eating acknowledging history of restriction and poor body image.        ASSESSMENT: Current Emotional / Mental Status (status of significant symptoms):   Risk status (Self / Other harm or suicidal ideation)   Patient denies current fears or concerns for personal safety.   Patient denies current or recent suicidal ideation or behaviors.   Patientdenies current or recent homicidal ideation or behaviors.   Patient denies current or recent self injurious behavior or ideation.   Patient denies other safety concerns.   Patient Patient reports there has been no  change in risk factors since their last session.     PatientPatient reports there has been no change in protective factors since their last session.     Recommended that patient call 911 or go to the local ED should there be a change in any of these risk factors.     Appearance:   Appropriate    Eye Contact:   Good    Psychomotor Behavior: Normal    Attitude:   Cooperative    Orientation:   All   Speech    Rate / Production: Normal     Volume:  Normal    Mood:    Anxious  Depressed    Affect:    Appropriate    Thought Content:  Clear    Thought Form:  Coherent  Logical    Insight:    Good      Medication Review:   No changes to current psychiatric medication(s)     Medication Compliance:   Yes     Changes in Health Issues:   None reported     Chemical Use Review:   Substance Use: Chemical use reviewed, no active concerns identified      Tobacco Use: No current tobacco use.      Diagnosis:  1. Generalized anxiety disorder         Collateral Reports Completed:   Not Applicable    PLAN: (Patient Tasks / Therapist Tasks / Other)  HOMEWORK: Practice cognitive reframing, encouragement, body relaxation, and distraction.    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     Practice extended driving and night driving to expose to these triggers.       Eunice Mixon MA, [facility]                                                          ______________________________________________________________________    Treatment Plan    Patient's Name: Tracee Cooper  YOB: 1970    Date: 7/30/19    Diagnoses:  300.02 (F41.1) Generalized Anxiety Disorder   Rule out Posttraumatic Stress Disorder  Psychosocial & Contextual Factors: Past trauma, children's mental health concerns, work stressors  WHODAS: Completed    Referral / Collaboration:  Referral to another professional/service is not indicated at this time.    Anticipated number of  session or this episode of care: 20      MeasurableTreatment Goal(s) related to diagnosis / functional impairment(s)  Goal 1: Patient will decrease anxiety levels as evidence by ORVILLE-7 scores, and client report.    I will know I've met my goal when I have less panic attacks, when I can drive to a new place without feeling anxious or panicked.      Objective #A (Patient Action)    Patient will identify 3 initial signs or symptoms of anxiety.  Status: New - Date: 7/30/19     Intervention(s)  Therapist will assign homework    teach emotional recognition/identification.      Objective #B  Patient will use cognitive strategies identified in therapy to challenge anxious thoughts.  Status: New - Date: 7/30/19     Intervention(s)  Therapist will assign homework    teach CBT techniques to identify and challenge cognitive distortions.    Objective #C  Patient will identify three distraction and diversion activities and use those activities to decrease level of anxiety  .  Status: New - Date: 7/30/19     Intervention(s)  Therapist will assign homework    teach DBT techniques including Mindfulness and Distress Tolerance.        Patient has reviewed and agreed to the above plan.      Eunice Mixon MA, [facility]  12/2/19

## 2019-12-03 ENCOUNTER — TELEPHONE (OUTPATIENT)
Dept: PALLIATIVE MEDICINE | Facility: CLINIC | Age: 49
End: 2019-12-03

## 2019-12-03 ENCOUNTER — OFFICE VISIT (OUTPATIENT)
Dept: NEUROSURGERY | Facility: CLINIC | Age: 49
End: 2019-12-03
Payer: COMMERCIAL

## 2019-12-03 ENCOUNTER — MYC REFILL (OUTPATIENT)
Dept: FAMILY MEDICINE | Facility: CLINIC | Age: 49
End: 2019-12-03

## 2019-12-03 VITALS
WEIGHT: 166.6 LBS | SYSTOLIC BLOOD PRESSURE: 157 MMHG | BODY MASS INDEX: 27.76 KG/M2 | HEIGHT: 65 IN | HEART RATE: 104 BPM | OXYGEN SATURATION: 97 % | DIASTOLIC BLOOD PRESSURE: 98 MMHG

## 2019-12-03 DIAGNOSIS — M54.12 CERVICAL RADICULOPATHY: Primary | ICD-10-CM

## 2019-12-03 DIAGNOSIS — F41.1 GENERALIZED ANXIETY DISORDER: ICD-10-CM

## 2019-12-03 PROCEDURE — 99203 OFFICE O/P NEW LOW 30 MIN: CPT | Performed by: NURSE PRACTITIONER

## 2019-12-03 ASSESSMENT — ANXIETY QUESTIONNAIRES: GAD7 TOTAL SCORE: 5

## 2019-12-03 ASSESSMENT — PAIN SCALES - GENERAL: PAINLEVEL: SEVERE PAIN (6)

## 2019-12-03 ASSESSMENT — MIFFLIN-ST. JEOR: SCORE: 1363.63

## 2019-12-03 NOTE — PROGRESS NOTES
"[facility]  Neurosurgery Clinic Visit      CC: neck and arm pain    Primary care Provider: Louise Agrawal      Reason For Visit:   I was asked by Dr. Marroquin to consult on the patient for cervical radiculopathy.      HPI: Tracee Cooper is a 49 year old female with a history of \"months\" of neck and arm pain. She denies injury at the onset of pain. Today she reports left>right neck pain that radiates down her left arm to the index and long fingers. She notes associated intermittent numbness in her left arm. She state she feels like her arm \"goes to sleep\" with certain activities and ROM of her neck. She denies overt weakness. She denies leg symptoms. She denies bowel/bladder complaints and foot drop. She states pain is worse with sitting and sleeping on her right side. She underwent a C7-T1 ILCESI on 2019 with improvement in right-sided pain symptoms. Since that time she has developed left-sided pain symptoms. She has also underwent PT with worsening of symptoms. She has not had any surgery.    Pain at its worst 9  Pain right now:  6    Past Medical History:   Diagnosis Date     INFERTILITY 2006 - Clomid trial at 50mg and increase to 100 if not ovulating by predictor.  Discussed clotting, PMS, risks etc.  Referral to fertility specialist in meantime.     Other malignant neoplasm of skin, site unspecified      Premenstrual tension syndromes 3/29/2005       Past Medical History reviewed with patient during visit.    Past Surgical History:   Procedure Laterality Date     C/SECTION, LOW TRANSVERSE      , Low Transverse     D & C  3/8/06    Missed AB at 7 weeks     DILATION AND CURETTAGE, HYSTEROSCOPY, ABLATE ENDOMETRIUM, COMBINED N/A 11/3/2016    Procedure: COMBINED DILATION AND CURETTAGE, HYSTEROSCOPY, ABLATE ENDOMETRIUM;  Surgeon: Jane Marquez MD;  Location: [facility]     HYSTEROSCOPY  2006    F/U US Abnormalit s/p d&c     SURGICAL " HISTORY OF -   2002    Malignant melanoma of the right arm.     Past Surgical History reviewed with patient during visit.    Current Outpatient Medications   Medication     ALPRAZolam (XANAX) 1 MG tablet     amLODIPine (NORVASC) 10 MG tablet     cyclobenzaprine (FLEXERIL) 10 MG tablet     gabapentin (NEURONTIN) 300 MG capsule     predniSONE (DELTASONE) 20 MG tablet     venlafaxine (EFFEXOR-XR) 150 MG 24 hr capsule     No current facility-administered medications for this visit.        No Known Allergies    Social History     Socioeconomic History     Marital status:      Spouse name: Not on file     Number of children: 1     Years of education: Not on file     Highest education level: Not on file   Occupational History     Employer: SELF   Social Needs     Financial resource strain: Not on file     Food insecurity:     Worry: Not on file     Inability: Not on file     Transportation needs:     Medical: Not on file     Non-medical: Not on file   Tobacco Use     Smoking status: Former Smoker     Types: Cigarettes     Last attempt to quit: 2006     Years since quittin.8     Smokeless tobacco: Never Used   Substance and Sexual Activity     Alcohol use: Yes     Comment: occasionally     Drug use: No     Sexual activity: Yes     Partners: Male     Birth control/protection: Surgical     Comment: vasectomy   Lifestyle     Physical activity:     Days per week: Not on file     Minutes per session: Not on file     Stress: Not on file   Relationships     Social connections:     Talks on phone: Not on file     Gets together: Not on file     Attends Synagogue service: Not on file     Active member of club or organization: Not on file     Attends meetings of clubs or organizations: Not on file     Relationship status: Not on file     Intimate partner violence:     Fear of current or ex partner: Not on file     Emotionally abused: Not on file     Physically abused: Not on file     Forced sexual activity: Not  on file   Other Topics Concern     Parent/sibling w/ CABG, MI or angioplasty before 65F 55M? No   Social History Narrative     Not on file       Family History   Problem Relation Age of Onset     Prostate Cancer Father      Thyroid Disease Mother         Hypothyroid     Hypertension Mother      C.A.D. Maternal Grandfather      Cerebrovascular Disease Maternal Grandmother      Breast Cancer Maternal Aunt      Diabetes Paternal Grandmother         adult onset     Cancer Paternal Grandfather         stomach     Cancer - colorectal No family hx of          ROS: 10 point ROS neg other than the symptoms noted above in the HPI.    Vital Signs:   There were no vitals taken for this visit.      Examination:  Constitutional:  Alert, well nourished, NAD.  Memory: recent and remote memory   HEENT: Normocephalic, atraumatic.   Pulm:  Without shortness of breath   CV:  No pitting edema of BLE.      Neurological:  Awake  Alert  Oriented x 3  Speech clear  Tongue midline    Motor exam:  Shoulder Abduction:  Right:  5/5   Left:  5/5  Biceps:                      Right:  5/5   Left:  5/5  Triceps:                     Right:  5/5   Left:  5/5  Wrist Extensors:       Right:  5/5   Left:  5/5  Wrist Flexors:           Right:  5/5   Left:  5/5  Intrinsics:                   Right:  5/5   Left:  5/5   Hip Flexor:                Right: 5/5  Left:  5/5  Hip Adductor:             Right:  5/5  Left:  5/5  Hip Abductor:             Right:  5/5  Left:  5/5  Gastroc Soleus:        Right:  5/5  Left:  5/5  Tib/Ant:                      Right:  5/5  Left:  5/5  EHL:                          Right:  5/5  Left:  5/5     Sensation normal to bilateral upper and lower extremities  Muscle tone to bilateral upper and lower extremities   Gait: Able to stand from a seated position. Normal non-antalgic, non-myelopathic gait.  Able to heel/toe walk without loss of balance    Cervical examination reveals good range of motion.  No tenderness to palpation of  the cervical spine or paraspinous muscles bilaterally.       Imaging: Cervical MRI 9/6/2019  FINDINGS: There is normal alignment of the cervical vertebrae; however, there is straightening of normal cervical lordosis. Vertebral body heights of the cervical spine are normal. Marrow signal throughout the cervical vertebrae is within normal limits. There is no evidence for fracture or pathologic bony lesion of the cervical spine.      There are posterior disc herniations at C5-C6 and C6-C7 levels. The cervical intervertebral discs are otherwise within normal limits in height, contour and signal intensity.     The cervical spinal cord is mildly deformed by degenerative changes at the C5-C6 and C6-C7 levels. The cervical spinal cord is otherwise normal in contour. There is no abnormal signal in the cervical spinal cord. There is no evidence for intrathecal abnormality.     Level by level:     C2-C3: There is no spinal canal or neural foraminal stenosis at this level.     C3-C4: There is facet arthropathy and uncinate arthropathy bilaterally. There is mild left foraminal narrowing but no spinal  canal or right foraminal stenosis.     C4-C5: There is facet arthropathy and uncinate arthropathy bilaterally resulting in mild left foraminal narrowing. There is no right foraminal or spinal canal narrowing.     C5-C6: There is facet arthropathy bilaterally, uncinate hypertrophy bilaterally and a posterior broad-based disc-osteophyte complex with a superimposed small posterior broad-based disc herniation (protrusion).  These findings result in moderate spinal canal stenosis with mild flattening of the left anterior surface of the cervical spinal cord,  moderate left foraminal stenosis and mild right foraminal narrowing.     C6-C7: There is facet arthropathy bilaterally, uncinate hypertrophy bilaterally and a posterior broad-based disc-osteophyte complex with a superimposed small posterior broad-based disc herniation  "(protrusion).  These findings result in mild spinal canal narrowing and mild left foraminal narrowing but no right foraminal stenosis.     C7-T1: There is no spinal canal or neural foraminal stenosis at this level.    Assessment/Plan:   Tracee Cooper is a 49 year old female with a history of \"months\" of neck and arm pain. She denies injury at the onset of pain. Today she reports left>right neck pain that radiates down her left arm to the index and long fingers. She notes associated intermittent numbness in her left arm. She state she feels like her arm \"goes to sleep\" with certain activities and ROM of her neck. She denies overt weakness. She denies leg symptoms. She denies bowel/bladder complaints and foot drop. Discussed cervical MRI in detail. Due to worsening pain with PT would recommend her to come back to see Dr. Bazan to discuss options. May try repeat SHANDA in the interim. She verbalized understanding and agreement.    Patient Instructions   -Cervical injection ordered. They will contact you to schedule.  -Please contact the clinic if symptoms persists at [phone].  -Jennie to follow up with Primary Care provider regarding elevated blood pressure.      Delores Espitia, [facility]  [address] [place] [address]  [address] [place] [id number]  Tel [phone]  Fax [phone]    "

## 2019-12-03 NOTE — LETTER
"    12/3/2019         RE: Tracee Cooper  [address]  [facility] [address]        Dear Colleague,    [facility] Please see a copy of my visit note below.    [facility]  Neurosurgery Clinic Visit      CC: neck and arm pain    Primary care Provider: Louise Agrawal      Reason For Visit:   I was asked by Dr. Marroquin to consult on the patient for cervical radiculopathy.      HPI: Tracee Cooper is a 49 year old female with a history of \"months\" of neck and arm pain. She denies injury at the onset of pain. Today she reports left>right neck pain that radiates down her left arm to the index and long fingers. She notes associated intermittent numbness in her left arm. She state she feels like her arm \"goes to sleep\" with certain activities and ROM of her neck. She denies overt weakness. She denies leg symptoms. She denies bowel/bladder complaints and foot drop. She states pain is worse with sitting and sleeping on her right side. She underwent a C7-T1 ILCESI on 2019 with improvement in right-sided pain symptoms. Since that time she has developed left-sided pain symptoms. She has also underwent PT with worsening of symptoms. She has not had any surgery.    Pain at its worst 9  Pain right now:  6    Past Medical History:   Diagnosis Date     INFERTILITY 2006 - Clomid trial at 50mg and increase to 100 if not ovulating by predictor.  Discussed clotting, PMS, risks etc.  Referral to fertility specialist in meantime.     Other malignant neoplasm of skin, site unspecified      Premenstrual tension syndromes 3/29/2005       Past Medical History reviewed with patient during visit.    Past Surgical History:   Procedure Laterality Date     C/SECTION, LOW TRANSVERSE      , Low Transverse     D & C  3/8/06    Missed AB at 7 weeks     DILATION AND CURETTAGE, HYSTEROSCOPY, " ABLATE ENDOMETRIUM, COMBINED N/A 11/3/2016    Procedure: COMBINED DILATION AND CURETTAGE, HYSTEROSCOPY, ABLATE ENDOMETRIUM;  Surgeon: Jane Marquez MD;  Location: [facility] OR     HYSTEROSCOPY  2006    F/U US Abnormalit s/p d&c     SURGICAL HISTORY OF -   2002    Malignant melanoma of the right arm.     Past Surgical History reviewed with patient during visit.    Current Outpatient Medications   Medication     ALPRAZolam (XANAX) 1 MG tablet     amLODIPine (NORVASC) 10 MG tablet     cyclobenzaprine (FLEXERIL) 10 MG tablet     gabapentin (NEURONTIN) 300 MG capsule     predniSONE (DELTASONE) 20 MG tablet     venlafaxine (EFFEXOR-XR) 150 MG 24 hr capsule     No current facility-administered medications for this visit.        No Known Allergies    Social History     Socioeconomic History     Marital status:      Spouse name: Not on file     Number of children: 1     Years of education: Not on file     Highest education level: Not on file   Occupational History     Employer: SELF   Social Needs     Financial resource strain: Not on file     Food insecurity:     Worry: Not on file     Inability: Not on file     Transportation needs:     Medical: Not on file     Non-medical: Not on file   Tobacco Use     Smoking status: Former Smoker     Types: Cigarettes     Last attempt to quit: 2006     Years since quittin.8     Smokeless tobacco: Never Used   Substance and Sexual Activity     Alcohol use: Yes     Comment: occasionally     Drug use: No     Sexual activity: Yes     Partners: Male     Birth control/protection: Surgical     Comment: vasectomy   Lifestyle     Physical activity:     Days per week: Not on file     Minutes per session: Not on file     Stress: Not on file   Relationships     Social connections:     Talks on phone: Not on file     Gets together: Not on file     Attends Anabaptist service: Not on file     Active member of club or organization: Not on file     Attends meetings of  clubs or organizations: Not on file     Relationship status: Not on file     Intimate partner violence:     Fear of current or ex partner: Not on file     Emotionally abused: Not on file     Physically abused: Not on file     Forced sexual activity: Not on file   Other Topics Concern     Parent/sibling w/ CABG, MI or angioplasty before 65F 55M? No   Social History Narrative     Not on file       Family History   Problem Relation Age of Onset     Prostate Cancer Father      Thyroid Disease Mother         Hypothyroid     Hypertension Mother      C.A.D. Maternal Grandfather      Cerebrovascular Disease Maternal Grandmother      Breast Cancer Maternal Aunt      Diabetes Paternal Grandmother         adult onset     Cancer Paternal Grandfather         stomach     Cancer - colorectal No family hx of          ROS: 10 point ROS neg other than the symptoms noted above in the HPI.    Vital Signs:   There were no vitals taken for this visit.      Examination:  Constitutional:  Alert, well nourished, NAD.  Memory: recent and remote memory   HEENT: Normocephalic, atraumatic.   Pulm:  Without shortness of breath   CV:  No pitting edema of BLE.      Neurological:  Awake  Alert  Oriented x 3  Speech clear  Tongue midline    Motor exam:  Shoulder Abduction:  Right:  5/5   Left:  5/5  Biceps:                      Right:  5/5   Left:  5/5  Triceps:                     Right:  5/5   Left:  5/5  Wrist Extensors:       Right:  5/5   Left:  5/5  Wrist Flexors:           Right:  5/5   Left:  5/5  Intrinsics:                   Right:  5/5   Left:  5/5   Hip Flexor:                Right: 5/5  Left:  5/5  Hip Adductor:             Right:  5/5  Left:  5/5  Hip Abductor:             Right:  5/5  Left:  5/5  Gastroc Soleus:        Right:  5/5  Left:  5/5  Tib/Ant:                      Right:  5/5  Left:  5/5  EHL:                          Right:  5/5  Left:  5/5     Sensation normal to bilateral upper and lower extremities  Muscle tone to  bilateral upper and lower extremities   Gait: Able to stand from a seated position. Normal non-antalgic, non-myelopathic gait.  Able to heel/toe walk without loss of balance    Cervical examination reveals good range of motion.  No tenderness to palpation of the cervical spine or paraspinous muscles bilaterally.       Imaging: Cervical MRI 9/6/2019  FINDINGS: There is normal alignment of the cervical vertebrae; however, there is straightening of normal cervical lordosis. Vertebral body heights of the cervical spine are normal. Marrow signal throughout the cervical vertebrae is within normal limits. There is no evidence for fracture or pathologic bony lesion of the cervical spine.      There are posterior disc herniations at C5-C6 and C6-C7 levels. The cervical intervertebral discs are otherwise within normal limits in height, contour and signal intensity.     The cervical spinal cord is mildly deformed by degenerative changes at the C5-C6 and C6-C7 levels. The cervical spinal cord is otherwise normal in contour. There is no abnormal signal in the cervical spinal cord. There is no evidence for intrathecal abnormality.     Level by level:     C2-C3: There is no spinal canal or neural foraminal stenosis at this level.     C3-C4: There is facet arthropathy and uncinate arthropathy bilaterally. There is mild left foraminal narrowing but no spinal  canal or right foraminal stenosis.     C4-C5: There is facet arthropathy and uncinate arthropathy bilaterally resulting in mild left foraminal narrowing. There is no right foraminal or spinal canal narrowing.     C5-C6: There is facet arthropathy bilaterally, uncinate hypertrophy bilaterally and a posterior broad-based disc-osteophyte complex with a superimposed small posterior broad-based disc herniation (protrusion).  These findings result in moderate spinal canal stenosis with mild flattening of the left anterior surface of the cervical spinal cord,  moderate left foraminal  "stenosis and mild right foraminal narrowing.     C6-C7: There is facet arthropathy bilaterally, uncinate hypertrophy bilaterally and a posterior broad-based disc-osteophyte complex with a superimposed small posterior broad-based disc herniation (protrusion).  These findings result in mild spinal canal narrowing and mild left foraminal narrowing but no right foraminal stenosis.     C7-T1: There is no spinal canal or neural foraminal stenosis at this level.    Assessment/Plan:   Tracee Cooper is a 49 year old female with a history of \"months\" of neck and arm pain. She denies injury at the onset of pain. Today she reports left>right neck pain that radiates down her left arm to the index and long fingers. She notes associated intermittent numbness in her left arm. She state she feels like her arm \"goes to sleep\" with certain activities and ROM of her neck. She denies overt weakness. She denies leg symptoms. She denies bowel/bladder complaints and foot drop. Discussed cervical MRI in detail. Due to worsening pain with PT would recommend her to come back to see Dr. Bazan to discuss options. May try repeat SHANDA in the interim. She verbalized understanding and agreement.    Patient Instructions   -Cervical injection ordered. They will contact you to schedule.  -Please contact the clinic if symptoms persists at [phone].  -Jennie to follow up with Primary Care provider regarding elevated blood pressure.      Delores Espitia CNP  [facility]  [address] [place] [id number]  Tel [phone]  Fax [phone]      Again, thank you for allowing me to participate in the care of your patient.        Sincerely,        Delores Espitia, NP    "

## 2019-12-03 NOTE — PATIENT INSTRUCTIONS
-Cervical injection ordered. They will contact you to schedule.  -Please contact the clinic if symptoms persists at [phone].  -Jennie to follow up with Primary Care provider regarding elevated blood pressure.

## 2019-12-03 NOTE — NURSING NOTE
"Tracee Cooper is a 49 year old female who presents for:  Chief Complaint   Patient presents with     Neurologic Problem     Neck pain. Referred by Dr. Marroquin. Last injection: 9/17/19, helped with pain but started PT and so pain went to the other side. MRI 9/6/19. Xray 9/6/19. Onset: Months. One day she woke up with neck pain. Pain will travel down bilateral arm. She will have some N/T in the arms as well. She will get daily headaches.         Vitals:    Vitals:    12/03/19 1109   BP: (!) 157/98   Pulse: 104   SpO2: 97%   Weight: 166 lb 9.6 oz (75.6 kg)   Height: 5' 4.5\" (1.638 m)       BMI:  Estimated body mass index is 28.16 kg/m  as calculated from the following:    Height as of this encounter: 5' 4.5\" (1.638 m).    Weight as of this encounter: 166 lb 9.6 oz (75.6 kg).    Pain Score:  Severe Pain (6)        Sandie Cook CMA      "

## 2019-12-04 RX ORDER — ALPRAZOLAM 1 MG
1 TABLET ORAL 3 TIMES DAILY PRN
Qty: 20 TABLET | Refills: 0 | Status: SHIPPED | OUTPATIENT
Start: 2019-12-04 | End: 2020-01-16

## 2019-12-16 ENCOUNTER — OFFICE VISIT (OUTPATIENT)
Dept: PSYCHOLOGY | Facility: CLINIC | Age: 49
End: 2019-12-16
Payer: COMMERCIAL

## 2019-12-16 DIAGNOSIS — F41.1 GENERALIZED ANXIETY DISORDER: Primary | ICD-10-CM

## 2019-12-16 PROCEDURE — 90834 PSYTX W PT 45 MINUTES: CPT | Performed by: COUNSELOR

## 2019-12-16 ASSESSMENT — PATIENT HEALTH QUESTIONNAIRE - PHQ9
SUM OF ALL RESPONSES TO PHQ QUESTIONS 1-9: 0
5. POOR APPETITE OR OVEREATING: NOT AT ALL

## 2019-12-16 ASSESSMENT — ANXIETY QUESTIONNAIRES
7. FEELING AFRAID AS IF SOMETHING AWFUL MIGHT HAPPEN: SEVERAL DAYS
1. FEELING NERVOUS, ANXIOUS, OR ON EDGE: SEVERAL DAYS
IF YOU CHECKED OFF ANY PROBLEMS ON THIS QUESTIONNAIRE, HOW DIFFICULT HAVE THESE PROBLEMS MADE IT FOR YOU TO DO YOUR WORK, TAKE CARE OF THINGS AT HOME, OR GET ALONG WITH OTHER PEOPLE: SOMEWHAT DIFFICULT
2. NOT BEING ABLE TO STOP OR CONTROL WORRYING: SEVERAL DAYS
GAD7 TOTAL SCORE: 5
3. WORRYING TOO MUCH ABOUT DIFFERENT THINGS: SEVERAL DAYS
5. BEING SO RESTLESS THAT IT IS HARD TO SIT STILL: NOT AT ALL
6. BECOMING EASILY ANNOYED OR IRRITABLE: SEVERAL DAYS

## 2019-12-16 NOTE — PROGRESS NOTES
Progress Note    Patient Name: Tracee Cooper  Date: 12/16/19         Service Type: Individual  Video Visit: No     Session Start Time: 11am  Session End Time: 11:45     Session Length: 45    Session #: 13    Attendees: Client attended alone     Treatment Plan Last Reviewed: 12/16/19 (Review by 3/16/20)  CGI: 12/16/19  PHQ-9 / ORVILLE-7 : 0/5        DATA  Interactive Complexity: No  Crisis: No       Progress Since Last Session (Related to Symptoms / Goals / Homework):   Symptoms: Improving      Homework: Achieved / completed to satisfaction      Episode of Care Goals: Satisfactory progress - ACTION (Actively working towards change); Intervened by reinforcing change plan / affirming steps taken     Current / Ongoing Stressors and Concerns:   Ongoing: Anxiety and panic attacks while driving   Current: Medical concerns, concerns around the health of her daughter that trigger's personal judgment around body image.     Treatment Objective(s) Addressed in This Session:   Patient will use cognitive strategies identified in therapy to challenge anxious thoughts.     Intervention:   CBT: Reinforced effective practice of exposure, cognitive challenges, and body relaxation. Discussed continued ways in which to generalize these skills to manage anxiety and panic. Identified client's emotional triggers around daughter's weight, and ways she can cope/reframe with these judgements.         ASSESSMENT: Current Emotional / Mental Status (status of significant symptoms):   Risk status (Self / Other harm or suicidal ideation)   Patient denies current fears or concerns for personal safety.   Patient denies current or recent suicidal ideation or behaviors.   Patientdenies current or recent homicidal ideation or behaviors.   Patient denies current or recent self injurious behavior or ideation.   Patient denies other safety concerns.   Patient Patient reports there has been no change in risk  factors since their last session.     PatientPatient reports there has been no change in protective factors since their last session.     Recommended that patient call 911 or go to the local ED should there be a change in any of these risk factors.     Appearance:   Appropriate    Eye Contact:   Good    Psychomotor Behavior: Normal    Attitude:   Cooperative    Orientation:   All   Speech    Rate / Production: Normal     Volume:  Normal    Mood:    Anxious  Depressed    Affect:    Appropriate    Thought Content:  Clear    Thought Form:  Coherent  Logical    Insight:    Good      Medication Review:   No changes to current psychiatric medication(s)     Medication Compliance:   Yes     Changes in Health Issues:   None reported     Chemical Use Review:   Substance Use: Chemical use reviewed, no active concerns identified      Tobacco Use: No current tobacco use.      Diagnosis:  1. Generalized anxiety disorder         Collateral Reports Completed:   Not Applicable    PLAN: (Patient Tasks / Therapist Tasks / Other)  HOMEWORK: Practice cognitive reframing, encouragement, body relaxation, and distraction. Use this with practice of challenging cognitive distortions around body-image. Worksheet given.      Eunice Mixon MA, [facility]                                                          ______________________________________________________________________    Treatment Plan    Patient's Name: Tracee Cooper  YOB: 1970    Date: 12/16/19    Diagnoses:  300.02 (F41.1) Generalized Anxiety Disorder   Rule out Posttraumatic Stress Disorder  Psychosocial & Contextual Factors: Past trauma, children's mental health concerns, work stressors  WHODAS: Completed    Referral / Collaboration:  Referral to another professional/service is not indicated at this time.    Anticipated number of session or this episode of care: 20      MeasurableTreatment Goal(s) related to diagnosis / functional impairment(s)  Goal 1: Patient  will decrease anxiety levels as evidence by ORVILLE-7 scores, and client report.    I will know I've met my goal when I have less panic attacks, when I can drive to a new place without feeling anxious or panicked.      Objective #A (Patient Action)    Patient will identify 3 initial signs or symptoms of anxiety.  Status: Continued - Date(s): 12/16/19    Intervention(s)  Therapist will assign homework    teach emotional recognition/identification.      Objective #B  Patient will use cognitive strategies identified in therapy to challenge anxious thoughts.  Status: Continued - Date(s): 12/16/19    Intervention(s)  Therapist will assign homework    teach CBT techniques to identify and challenge cognitive distortions.    Objective #C  Patient will identify three distraction and diversion activities and use those activities to decrease level of anxiety  .  Status: Continued - Date(s): 12/16/19    Intervention(s)  Therapist will assign homework    teach DBT techniques including Mindfulness and Distress Tolerance.        Patient has reviewed and agreed to the above plan.      Eunice Mixon MA, [facility]  12/16/19

## 2019-12-17 ASSESSMENT — ANXIETY QUESTIONNAIRES: GAD7 TOTAL SCORE: 5

## 2019-12-19 ENCOUNTER — TELEPHONE (OUTPATIENT)
Dept: NEUROSURGERY | Facility: CLINIC | Age: 49
End: 2019-12-19

## 2019-12-19 NOTE — TELEPHONE ENCOUNTER
Called patient to discuss today's appointment and see if she received the SHANDA that was recommended by LEON Estrella. Patient states she never received a call to schedule her injection, on chart review looks like pain management attempted to reach her on 12/3 to schedule. I advised patient to cancel today's appointment and to schedule SHANDA with pain management. Patient instructed to return call to clinic 2 weeks after receiving injection if symptoms persist. Patient in agreement with plan and the number for pain management was given to patient.

## 2019-12-19 NOTE — TELEPHONE ENCOUNTER
Pre-screening Questions for Radiology Injections:    Injection to be done at which interventional clinic site? [facility] - [place]    Instruct patient to arrive as directed prior to the scheduled appointment time:    [place] minutes before      [place]: 30 minutes before; if IV needed 1 hour before     Procedure ordered by Delores Espitia NP    Procedure ordered? Epidural:  Cervical      Transforaminal Cervical PAT - Dr. Shanell León ONLY    What insurance would patient like us to bill for this procedure? BCBS      Worker's comp or MVA (motor vehicle accident) -Any injection DO NOT SCHEDULE and route to Jessenia Pearl.      [employer] insurance - For SI joint injections, DO NOT SCHEDULE and route Jessenia Pearl.       [employer] - Any injection besides hip/shoulder/knee joint DO NOT SCHEDULE and route to Jessenia Pearl. She will obtain PA and call pt back to schedule procedure or notify pt of denial.       HP CIGNA-Route to [place] for review      **BCBS- ALL need to be routed to [place] for review if a PA is needed**      IF SCHEDULING IN [place] AND NEEDS A PA, IT IS OKAY TO SCHEDULE. [place] HANDLES THEIR OWN PA'S AFTER THE PATIENT IS SCHEDULED. PLEASE SCHEDULE AT LEAST 1 WEEK OUT SO A PA CAN BE OBTAINED.    Any chance of pregnancy? NO   If YES, do NOT schedule and route to RN [place]    Is an  needed? No     Patient has a drive home? (mandatory) YES: Informed    Is patient taking any blood thinners (i.e. plavix, coumadin, jantoven, warfarin, heparin, pradaxa or dabigatran, etc)? No   If hold needed, do NOT schedule, route to RN pool     Is patient taking any aspirin products (includes Excedrin and Fiorinal)? No     If more than 325mg/day do NOT schedule; route to RN pool     For CERVICAL procedures, hold all aspirin products for 6 days.     Tell pt that if aspirin product is not held for 6 days, the procedure WILL BE cancelled.      Does the patient have a bleeding or clotting  disorder? No     If YES, okay to schedule AND route to RN nurse pool    For any patients with platelet count <100, must be forwarded to provider    Is patient diabetic?  No  If YES, instruct them to bring their glucometer.    Does patient have an active infection or treated for one within the past week? No     Is patient currently taking any antibiotics?  No     For patients on chronic, preventative, or prophylactic antibiotics, procedures may be scheduled.     For patients on antibiotics for active or recent infection:antibiotic course must have been completed for 4 days    Is patient currently taking any steroid medications? (i.e. Prednisone, Medrol)  No     For patients on steroid medications, course must have been completed for 4 days    Reviewed with patient:  If you are started on any steroids or antibiotics between now and your appointment, you must contact us because the procedure may need to be cancelled.  Yes    Is patient actively being treated for cancer or immunocompromised? No  If YES, do NOT schedule and route to RN pool     Are you able to get on and off an exam table with minimal or no assistance? Yes  If NO, do NOT schedule and route to RN pool    Are you able to roll over and lay on your stomach with minimal or no assistance? Yes  If NO, do NOT schedule and route to RN pool     Any allergies to contrast dye, iodine, shellfish, or numbing and steroid medications? No  If YES, route to RN pool AND add allergy information to appointment notes    Allergies: Patient has no known allergies.      Has the patient had a flu shot or any other vaccinations within 7 days before or after the procedure.  No     Does patient have an MRI/CT?  YES: 2019  Check Procedure Scheduling Grid to see if required.      Was the MRI done within the last 3 years?  Yes    If yes, where was the MRI done i.e.[facility], [facility], [facility], [facility] etc? [facility]      If no, do not schedule and route to RN pool    If MRI was  not done at [facility], [facility] or [facility] do NOT schedule and route to RN pool.      If pt has an imaging disc, the injection MAY be scheduled but pt has to bring disc to appt.     If they show up without the disc the injection cannot be done    Reminders (please tell patient if applicable):       Instructed pt to arrive 30 minutes early for IV start if required. (Check Procedure Scheduling Grid)  Not Applicable      If celiac plexus block, informed patient NPO for 6 hours and that it is okay to take medications with sips of water, especially blood pressure medications  Not Applicable         If this is for a cervical procedure, informed patient that aspirin needs to be held for 6 days.   Not Applicable      For all patients not having spinal cord stimulator (SCS) trials or radiofrequency ablations (RFAs), informed patient:    IV sedation is not provided for this procedure.  If you feel that an oral anti-anxiety medication is needed, you can discuss this further with your referring provider or primary care provider.  The Pain Clinic provider will discuss specifics of what the procedure includes at your appointment.  Most procedures last 10-20 minutes.  We use numbing medications to help with any discomfort during the procedure.  Not Applicable      Do not schedule procedures requiring IV placement in the first appointment of the day or first appointment after lunch. Do NOT schedule at 0745, 0815 or 1245.       For patients 85 or older we recommend having an adult stay w/ them for the remainder of the day.       Does the patient have any questions?  NO  Pao Martines  [facility]

## 2019-12-20 NOTE — TELEPHONE ENCOUNTER
Called pt to schedule Left C7-T1 interlaminar epidural. Pt was driving and could not hear over bluetooth. Pt hung up.    Pao ALFONSO    [facility]

## 2019-12-20 NOTE — TELEPHONE ENCOUNTER
Called and spoke to Irma at [facility], no PA is required for SHANDA Loja to schedule        Jessenia LEE    [facility]

## 2019-12-20 NOTE — TELEPHONE ENCOUNTER
PA pending additional information - please specify exact level, laterality and approach of injection.        Jessenia LEE    [facility]

## 2019-12-23 ENCOUNTER — TELEPHONE (OUTPATIENT)
Dept: FAMILY MEDICINE | Facility: CLINIC | Age: 49
End: 2019-12-23

## 2019-12-23 NOTE — TELEPHONE ENCOUNTER
I have attempted to contact this patient by phone with the following results: left message to return my call on answering machine. CSS ok to give patient message.    Rula Rodrigues RN

## 2019-12-23 NOTE — TELEPHONE ENCOUNTER
Please give Jennie a call.  Her BP has been high at multiple recent checks and I know she has been dealing with some pain issues as well.    Please ask her to have her BP and pulse checked by the MA when she is in to see Eunice on 12/27.  If still high, we'll want to consider adding a medicine to address the elevated blood pressure.      Louise Agrawal, DO

## 2019-12-23 NOTE — TELEPHONE ENCOUNTER
Patient notified and expressed understanding. Scheduled BP and HR check for 12/27 after her visit with Eunice.    Rula Rodrigues RN

## 2019-12-27 ENCOUNTER — ALLIED HEALTH/NURSE VISIT (OUTPATIENT)
Dept: FAMILY MEDICINE | Facility: CLINIC | Age: 49
End: 2019-12-27
Payer: COMMERCIAL

## 2019-12-27 ENCOUNTER — OFFICE VISIT (OUTPATIENT)
Dept: PSYCHOLOGY | Facility: CLINIC | Age: 49
End: 2019-12-27
Payer: COMMERCIAL

## 2019-12-27 VITALS — SYSTOLIC BLOOD PRESSURE: 140 MMHG | HEART RATE: 96 BPM | DIASTOLIC BLOOD PRESSURE: 90 MMHG

## 2019-12-27 DIAGNOSIS — F41.1 GENERALIZED ANXIETY DISORDER: Primary | ICD-10-CM

## 2019-12-27 DIAGNOSIS — Z01.30 BP CHECK: Primary | ICD-10-CM

## 2019-12-27 PROCEDURE — 90834 PSYTX W PT 45 MINUTES: CPT | Performed by: COUNSELOR

## 2019-12-27 PROCEDURE — 99207 ZZC NO CHARGE NURSE ONLY: CPT

## 2019-12-27 NOTE — PROGRESS NOTES
Progress Note    Patient Name: Tracee Cooper  Date: 12/27/19         Service Type: Individual  Video Visit: No     Session Start Time: 9:34am  Session End Time: 10:15     Session Length: 45    Session #: 14    Attendees: Client attended alone     Treatment Plan Last Reviewed: 12/16/19 (Review by 3/16/20)  CGI: 12/16/19  PHQ-9 / ORVILLE-7 : ---        DATA  Interactive Complexity: No  Crisis: No       Progress Since Last Session (Related to Symptoms / Goals / Homework):   Symptoms: Improving      Homework: Achieved / completed to satisfaction      Episode of Care Goals: Satisfactory progress - ACTION (Actively working towards change); Intervened by reinforcing change plan / affirming steps taken     Current / Ongoing Stressors and Concerns:   Ongoing: Anxiety and panic attacks while driving   Current: Identified multiple anxiety triggers including medical concerns, work stressors, holidays, and family stressors.     Treatment Objective(s) Addressed in This Session:   Patient will use cognitive strategies identified in therapy to challenge anxious thoughts.     Intervention:   CBT: Reinforced effective practice of exposure, cognitive challenges, and body relaxation. Discussed continued ways in which to generalize these skills to manage anxiety and panic. Identified vulnerabilities to increased anxiety and irritability this week, challenged distorted thoughts and practice breathing techniques.        ASSESSMENT: Current Emotional / Mental Status (status of significant symptoms):   Risk status (Self / Other harm or suicidal ideation)   Patient denies current fears or concerns for personal safety.   Patient denies current or recent suicidal ideation or behaviors.   Patientdenies current or recent homicidal ideation or behaviors.   Patient denies current or recent self injurious behavior or ideation.   Patient denies other safety concerns.   Patient Patient reports there has been  no change in risk factors since their last session.     PatientPatient reports there has been no change in protective factors since their last session.     Recommended that patient call 911 or go to the local ED should there be a change in any of these risk factors.     Appearance:   Appropriate    Eye Contact:   Good    Psychomotor Behavior: Normal    Attitude:   Cooperative    Orientation:   All   Speech    Rate / Production: Normal     Volume:  Normal    Mood:    Anxious  Depressed    Affect:    Appropriate    Thought Content:  Clear    Thought Form:  Coherent  Logical    Insight:    Good      Medication Review:   No changes to current psychiatric medication(s)     Medication Compliance:   Yes     Changes in Health Issues:   None reported     Chemical Use Review:   Substance Use: Chemical use reviewed, no active concerns identified      Tobacco Use: No current tobacco use.      Diagnosis:  1. Generalized anxiety disorder         Collateral Reports Completed:   Not Applicable    PLAN: (Patient Tasks / Therapist Tasks / Other)  HOMEWORK: Practice cognitive reframing, encouragement, body relaxation, and distraction.    Eunice Mixon MA, [facility]                                                          ______________________________________________________________________    Treatment Plan    Patient's Name: Tracee Cooper  YOB: 1970    Date: 12/16/19    Diagnoses:  300.02 (F41.1) Generalized Anxiety Disorder   Rule out Posttraumatic Stress Disorder  Psychosocial & Contextual Factors: Past trauma, children's mental health concerns, work stressors  WHODAS: Completed    Referral / Collaboration:  Referral to another professional/service is not indicated at this time.    Anticipated number of session or this episode of care: 20      MeasurableTreatment Goal(s) related to diagnosis / functional impairment(s)  Goal 1: Patient will decrease anxiety levels as evidence by ORVILLE-7 scores, and client report.    I  will know I've met my goal when I have less panic attacks, when I can drive to a new place without feeling anxious or panicked.      Objective #A (Patient Action)    Patient will identify 3 initial signs or symptoms of anxiety.  Status: Continued - Date(s): 12/16/19    Intervention(s)  Therapist will assign homework    teach emotional recognition/identification.      Objective #B  Patient will use cognitive strategies identified in therapy to challenge anxious thoughts.  Status: Continued - Date(s): 12/16/19    Intervention(s)  Therapist will assign homework    teach CBT techniques to identify and challenge cognitive distortions.    Objective #C  Patient will identify three distraction and diversion activities and use those activities to decrease level of anxiety  .  Status: Continued - Date(s): 12/16/19    Intervention(s)  Therapist will assign homework    teach DBT techniques including Mindfulness and Distress Tolerance.        Patient has reviewed and agreed to the above plan.      Eunice Mixon MA, [facility]  12/27/19

## 2019-12-27 NOTE — PROGRESS NOTES
SUBJECTIVE:  Tracee Cooper is a 49 year old female who presents for a follow up evaluation of her hypertension.    The reason for the visit is:  an elevated blood pressure was noted elsewhere and they were told to come for a recheck    Patient is taking medication as prescribed  Patient is tolerating medications well.  Patient is monitoring Blood Pressure at home.  Average readings if yes are 130's-140's/90's. Pulse has been over 100 consistently.     Current complaints: rapid pulse, discuss changing to different blood pressure medication      Current Outpatient Medications   Medication     ALPRAZolam (XANAX) 1 MG tablet     amLODIPine (NORVASC) 10 MG tablet     cyclobenzaprine (FLEXERIL) 10 MG tablet     gabapentin (NEURONTIN) 300 MG capsule     predniSONE (DELTASONE) 20 MG tablet     venlafaxine (EFFEXOR-XR) 150 MG 24 hr capsule     No current facility-administered medications for this visit.        No Known Allergies      OBJECTIVE:  Please get a blood pressure AND a pulse.  A height is also needed if has not been done in the past year.    BP (!) 140/90 (BP Location: Right arm, Patient Position: Sitting, Cuff Size: Adult Regular)   Pulse 96     Vitals as recorded, a regular cuff was used.    ASSESSMENT:    Is the HYPERTENSION goal on the problem list? Yes  Patient Active Problem List   Diagnosis     H/O Malignant melanoma     Anemia     Hirsutism     CARDIOVASCULAR SCREENING; LDL GOAL LESS THAN 160     Menorrhagia     Generalized anxiety disorder     Chronic diarrhea     Excessive bleeding in premenopausal period     Essential hypertension with goal blood pressure less than 140/90     Neck pain     Cervical radiculitis       Plan:  The patient's blood pressure is less than documented goal. The patient will be discharged home. CC: this note to the patient's primary provider.     The patient s blood pressure is higher than goal but is less than 180 systolically AND less that 110 diastolically. The patient will  be discharged home.  A telephone encounter will be created with this note and sent to the patient's primary provider for action.     The patient's blood pressure is above 180 systolically OR is above 110 diastolically. The primary provider, RN, or an available provider will be consulted for immediate action. Keep the patient here for appropriate urgent action.

## 2019-12-30 ENCOUNTER — TELEPHONE (OUTPATIENT)
Dept: FAMILY MEDICINE | Facility: CLINIC | Age: 49
End: 2019-12-30

## 2019-12-30 DIAGNOSIS — I10 ESSENTIAL HYPERTENSION WITH GOAL BLOOD PRESSURE LESS THAN 140/90: Primary | ICD-10-CM

## 2019-12-30 RX ORDER — METOPROLOL SUCCINATE 25 MG/1
12.5 TABLET, EXTENDED RELEASE ORAL DAILY
Qty: 15 TABLET | Refills: 0 | Status: SHIPPED | OUTPATIENT
Start: 2019-12-30 | End: 2020-01-16

## 2019-12-30 NOTE — TELEPHONE ENCOUNTER
Called patient  Advised per Dr Agrawal note,   Review note and answered question, pt ok trying   Future appt for f/u made 1/16/2020  Pt will call if question before   [facility]  RN/Phil Celeste

## 2019-12-30 NOTE — TELEPHONE ENCOUNTER
Please call pt.  BP remains elevated on amlodipine 10mg.  She has had intolerances to multiple other medications.  I would not recommend switching medicines at this point but would recommend adding a beta blocker since it is a class of medicine she has not tried and she is concerned by her pulse.    We can add metoprolol 12.5mg with a recheck of heart rate and BP in 1-2 weeks.  Script sent to her [place]    Possible side effects include fatigue.    Louise Agrawal, DO

## 2020-01-04 DIAGNOSIS — F41.1 GENERALIZED ANXIETY DISORDER: ICD-10-CM

## 2020-01-06 ENCOUNTER — MYC REFILL (OUTPATIENT)
Dept: FAMILY MEDICINE | Facility: CLINIC | Age: 50
End: 2020-01-06

## 2020-01-06 DIAGNOSIS — F41.1 GENERALIZED ANXIETY DISORDER: ICD-10-CM

## 2020-01-06 RX ORDER — VENLAFAXINE HYDROCHLORIDE 150 MG/1
CAPSULE, EXTENDED RELEASE ORAL
Qty: 90 CAPSULE | Refills: 0 | Status: CANCELLED | OUTPATIENT
Start: 2020-01-06

## 2020-01-06 NOTE — TELEPHONE ENCOUNTER
"Requested Prescriptions   Pending Prescriptions Disp Refills     venlafaxine (EFFEXOR-XR) 150 MG 24 hr capsule [Pharmacy Med Name: VENLAFAXINE ER 150MG CAPSULES] 90 capsule 0     Sig: TAKE 1 CAPSULE(150 MG) BY MOUTH DAILY  Last Written Prescription Date:  10/04/2019 #90 x 0  Last filled - not provided  Last office visit: 08/22/2019 OMAR Love     Future Office Visit:   Next 5 appointments (look out 90 days)    Jan 16, 2020  2:00 PM CST  SHORT with Louise Agrawal DO  [facility] [address] [address]  [phone]                Serotonin-Norepinephrine Reuptake Inhibitors  Failed - 1/4/2020  4:10 AM        Failed - Blood pressure under 140/90 in past 12 months     BP Readings from Last 3 Encounters:   12/27/19 (!) 140/90   12/03/19 (!) 157/98   10/15/19 (!) 142/90                 Passed - Recent (12 mo) or future (30 days) visit within the authorizing provider's specialty     Patient has had an office visit with the authorizing provider or a provider within the authorizing providers department within the previous 12 mos or has a future within next 30 days. See \"Patient Info\" tab in inbasket, or \"Choose Columns\" in Meds & Orders section of the refill encounter.              Passed - Medication is active on med list        Passed - Patient is age 18 or older        Passed - No active pregnancy on record        Passed - Normal serum creatinine on file in past 12 months     Recent Labs   Lab Test 06/03/19  1446   CR 0.68             Passed - No positive pregnancy test in past 12 months          "

## 2020-01-07 ENCOUNTER — MYC REFILL (OUTPATIENT)
Dept: FAMILY MEDICINE | Facility: CLINIC | Age: 50
End: 2020-01-07

## 2020-01-07 DIAGNOSIS — F41.1 GENERALIZED ANXIETY DISORDER: ICD-10-CM

## 2020-01-08 ENCOUNTER — MYC REFILL (OUTPATIENT)
Dept: FAMILY MEDICINE | Facility: CLINIC | Age: 50
End: 2020-01-08

## 2020-01-08 DIAGNOSIS — F41.1 GENERALIZED ANXIETY DISORDER: ICD-10-CM

## 2020-01-08 RX ORDER — VENLAFAXINE HYDROCHLORIDE 150 MG/1
CAPSULE, EXTENDED RELEASE ORAL
Qty: 30 CAPSULE | Refills: 0 | Status: SHIPPED | OUTPATIENT
Start: 2020-01-08 | End: 2020-01-16

## 2020-01-08 NOTE — TELEPHONE ENCOUNTER
Prescription approved per [facility] Refill Protocol.  30 day supply sent to pharmacy, additional refills can be addressed at 1/16/2020 visit with Dr. Nghia Velasquez, RN

## 2020-01-09 RX ORDER — VENLAFAXINE HYDROCHLORIDE 150 MG/1
CAPSULE, EXTENDED RELEASE ORAL
Qty: 90 CAPSULE | Refills: 0 | OUTPATIENT
Start: 2020-01-09

## 2020-01-16 ENCOUNTER — OFFICE VISIT (OUTPATIENT)
Dept: FAMILY MEDICINE | Facility: CLINIC | Age: 50
End: 2020-01-16
Payer: COMMERCIAL

## 2020-01-16 VITALS
WEIGHT: 166.6 LBS | SYSTOLIC BLOOD PRESSURE: 140 MMHG | HEART RATE: 88 BPM | BODY MASS INDEX: 27.76 KG/M2 | DIASTOLIC BLOOD PRESSURE: 82 MMHG | HEIGHT: 65 IN | RESPIRATION RATE: 16 BRPM | TEMPERATURE: 98.9 F

## 2020-01-16 DIAGNOSIS — N95.1 SYMPTOMATIC MENOPAUSAL OR FEMALE CLIMACTERIC STATES: Primary | ICD-10-CM

## 2020-01-16 DIAGNOSIS — I10 ESSENTIAL HYPERTENSION WITH GOAL BLOOD PRESSURE LESS THAN 140/90: ICD-10-CM

## 2020-01-16 DIAGNOSIS — F41.1 GENERALIZED ANXIETY DISORDER: ICD-10-CM

## 2020-01-16 PROCEDURE — 99214 OFFICE O/P EST MOD 30 MIN: CPT | Performed by: FAMILY MEDICINE

## 2020-01-16 RX ORDER — AMLODIPINE BESYLATE 10 MG/1
10 TABLET ORAL DAILY
Qty: 90 TABLET | Refills: 0 | Status: SHIPPED | OUTPATIENT
Start: 2020-01-16 | End: 2020-05-11

## 2020-01-16 RX ORDER — ALPRAZOLAM 1 MG
1 TABLET ORAL 3 TIMES DAILY PRN
Qty: 20 TABLET | Refills: 0 | Status: SHIPPED | OUTPATIENT
Start: 2020-01-16 | End: 2020-02-27

## 2020-01-16 RX ORDER — METOPROLOL SUCCINATE 25 MG/1
25 TABLET, EXTENDED RELEASE ORAL DAILY
Qty: 30 TABLET | Refills: 0 | Status: SHIPPED | OUTPATIENT
Start: 2020-01-16 | End: 2020-02-13

## 2020-01-16 RX ORDER — VENLAFAXINE HYDROCHLORIDE 150 MG/1
CAPSULE, EXTENDED RELEASE ORAL
Qty: 90 CAPSULE | Refills: 1 | Status: SHIPPED | OUTPATIENT
Start: 2020-01-16 | End: 2020-07-29

## 2020-01-16 ASSESSMENT — MIFFLIN-ST. JEOR: SCORE: 1363.63

## 2020-01-16 NOTE — PROGRESS NOTES
Subjective     Tracee Cooper is a 49 year old female who presents to clinic today for the following health issues:    HPI   Hypertension Follow-up      Do you check your blood pressure regularly outside of the clinic? Yes     Are you following a low salt diet? Yes    Are your blood pressures ever more than 140 on the top number (systolic) OR more   than 90 on the bottom number (diastolic), for example 140/90? No      Recently started metoprolol for HTN.  Seems to be tolerating it fine.  Has not noted any problems.  Due for BP recheck today.      Anxiety Follow-Up    How are you doing with your anxiety since your last visit? No change    Are you having other symptoms that might be associated with anxiety? No    Have you had a significant life event? No     Are you feeling depressed? No    Do you have any concerns with your use of alcohol or other drugs? No    Social History     Tobacco Use     Smoking status: Former Smoker     Types: Cigarettes     Last attempt to quit: 2006     Years since quittin.9     Smokeless tobacco: Never Used   Substance Use Topics     Alcohol use: Yes     Comment: occasionally     Drug use: No     ORVILLE-7 SCORE 2019   Total Score - - -   Total Score - - -   Total Score 7 5 5     PHQ 2019   PHQ-9 Total Score 2 3 0   Q9: Thoughts of better off dead/self-harm past 2 weeks Not at all Not at all Not at all       Feels anxiety is much better.  [place] the therapist was very beneficial.  Now on maternity leave but planning on getting back in with her when she is able.  For now would like to continue current venlafaxine dosage.      Takes the xanax as needed for panic.  Variable frequently of use.  Usually works well with 1-2 tablets.  Uses maybe 5-6 pills per month.      How many servings of fruits and vegetables do you eat daily?  2-3    On average, how many sweetened beverages do you drink each day (Examples: soda, juice, sweet tea,  "etc.  Do NOT count diet or artificially sweetened beverages)?   0    How many days per week do you exercise enough to make your heart beat faster? 3 or less    How many minutes a day do you exercise enough to make your heart beat faster? 9 or less    How many days per week do you miss taking your medication? 0    *  Has some hot flashes at night and can wake with anxiety.  Notes he mom had a \"terrible\" time with menopause.  Pt is s/p ablation.  Not sure where she is in the process.  Not really interested in  Beginning anything ow but would like to know what the options are.        Reviewed and updated as needed this visit by Provider  Tobacco  Allergies  Meds  Med Hx  Surg Hx  Fam Hx  Soc Hx        Review of Systems   ROS COMP: Constitutional, HEENT, cardiovascular, pulmonary, gi and gu systems are negative, except as otherwise noted.      Objective    BP (!) 140/82   Pulse 88   Temp 98.9  F (37.2  C) (Tympanic)   Resp 16   Ht 1.638 m (5' 4.5\")   Wt 75.6 kg (166 lb 9.6 oz)   Breastfeeding No   BMI 28.16 kg/m    Body mass index is 28.16 kg/m .  Physical Exam   PE:  VS as above   Gen:  WN/WD/WH female in NAD   Heart:  RRR without murmur, nl S1, S2, no rubs or gallops   Lungs CTA irish without rales/ronchi/wheezes   Ext:  No pedal edema   Psych: Alert and oriented times 3; coherent speech, normal   rate and volume, able to articulate logical thoughts, able   to abstract reason, no tangential thoughts, no hallucinations   or delusions  Her affect is mildly anxious      Diagnostic Test Results:  none         A/P:      ICD-10-CM    1. Symptomatic menopausal or female climacteric states N95.1    2. Generalized anxiety disorder F41.1 ALPRAZolam (XANAX) 1 MG tablet     venlafaxine (EFFEXOR-XR) 150 MG 24 hr capsule   3. Essential hypertension with goal blood pressure less than 140/90 I10 amLODIPine (NORVASC) 10 MG tablet     metoprolol succinate ER (TOPROL-XL) 25 MG 24 hr tablet     Patient Instructions   We'll " plan on increasing the metoprolol to 25mg once daily ( a full tablet).  Please let me know if you have trouble with it.  Otherwise let's plan another nurse blood pressure check in 1-2 weeks.        ORVILLE:  Control improved.  Continue venlafaxine at current dosage  Okay to continue xanax prn    HTN:  control improved, HR tolerating beta blocker.  Plan increased dose and f/u.  possible side effects discussed.    Vasomotor symptoms:  Pt will hold off on treatment for now.  Briefly reviewed HRT and herbals.  Handout given.

## 2020-01-16 NOTE — PATIENT INSTRUCTIONS
We'll plan on increasing the metoprolol to 25mg once daily ( a full tablet).  Please let me know if you have trouble with it.  Otherwise let's plan another nurse blood pressure check in 1-2 weeks.

## 2020-01-16 NOTE — NURSING NOTE
"Initial BP (!) 142/84   Pulse 88   Temp 98.9  F (37.2  C) (Tympanic)   Resp 16   Ht 1.638 m (5' 4.5\")   Wt 75.6 kg (166 lb 9.6 oz)   Breastfeeding No   BMI 28.16 kg/m   Estimated body mass index is 28.16 kg/m  as calculated from the following:    Height as of this encounter: 1.638 m (5' 4.5\").    Weight as of this encounter: 75.6 kg (166 lb 9.6 oz). .      "

## 2020-02-06 ENCOUNTER — RADIOLOGY INJECTION OFFICE VISIT (OUTPATIENT)
Dept: PALLIATIVE MEDICINE | Facility: CLINIC | Age: 50
End: 2020-02-06
Payer: COMMERCIAL

## 2020-02-06 ENCOUNTER — ANCILLARY PROCEDURE (OUTPATIENT)
Dept: RADIOLOGY | Facility: CLINIC | Age: 50
End: 2020-02-06
Attending: PAIN MEDICINE
Payer: COMMERCIAL

## 2020-02-06 VITALS
SYSTOLIC BLOOD PRESSURE: 160 MMHG | HEART RATE: 69 BPM | RESPIRATION RATE: 16 BRPM | DIASTOLIC BLOOD PRESSURE: 84 MMHG | OXYGEN SATURATION: 96 %

## 2020-02-06 DIAGNOSIS — M54.12 CERVICAL RADICULOPATHY: ICD-10-CM

## 2020-02-06 DIAGNOSIS — M54.12 CERVICAL RADICULOPATHY: Primary | ICD-10-CM

## 2020-02-06 PROCEDURE — 62321 NJX INTERLAMINAR CRV/THRC: CPT | Performed by: PAIN MEDICINE

## 2020-02-06 ASSESSMENT — PAIN SCALES - GENERAL: PAINLEVEL: MODERATE PAIN (4)

## 2020-02-06 NOTE — PATIENT INSTRUCTIONS
[facility]   Procedure Discharge Instructions    Today you saw:    Dr. Payam Galo      You had an:  Cervical Epidural steroid injection      Medications used:  Lidocaine   Bupivacaine   Dexamethasone Omnipaque               If you were holding your blood thinning medication, please restart taking it: N/A    Be cautious Numbness and/or weakness may occur for up to 6-8 hours after the procedure due to effect of the local anesthetic    Do not drive for 6 hours. The effect of the local anesthetic could slow your reflexes.     You may resume your regular activities after 24 hours    Avoid strenuous activity for the first 24 hours    You may shower, however avoid swimming, tub baths or hot tubs for 24 hours following your procedure    You may have a mild to moderate increase in pain for several days following the injection.    It may take up to 14 days for the steroid medication to start working although you may feel the effect as early as a few days after the procedure.       You may use ice packs for 10-15 minutes, 3 to 4 times a day at the injection site for comfort    Do not use heat to painful areas for 6 to 8 hours. This will give the local anesthetic time to wear off and prevent you from accidentally burning your skin.     Unless you have been directed to avoid the use of anti-inflammatory medications (NSAIDS), you may use medications such as ibuprofen, Aleve or Tylenol for pain control if needed.     Possible side effects of steroids that you may experience include flushing, elevated blood pressure, increased appetite, mild headaches and restlessness.  All of these symptoms will get better with time.    If you experience any of the following, call the Pain Clinic during work hours (Mon-Friday 8-4:30 pm) at [phone] or the Provider Line after hours at [phone]:  -Fever over 100 degree F  -Swelling, bleeding, redness, drainage, warmth at the injection site  -Progressive  weakness or numbness in your arms  -Unusual headache that is not relieved by Tylenol or other pain reliever  -Unusual new onset of pain that is not improving

## 2020-02-06 NOTE — NURSING NOTE
Pre-procedure Intake    Have you been fasting? NA    If yes, for how long? NA    Are you taking a prescribed blood thinner such as coumadin, Plavix, Xarelto?    No    If yes, when did you take your last dose? NA    Do you take aspirin?  No    If cervical procedure, have you held aspirin for 6 days?   NA    Do you have any allergies to contrast dye, iodine, steroid and/or numbing medications?  NO    Are you currently taking antibiotics or have an active infection?  NO    Have you had a fever/elevated temperature within the past week? NO    Are you currently taking oral steroids? NO    Do you have a ? Yes       Are you pregnant or breastfeeding?  NO    Are the vital signs normal?  Yes      Sejal Flores CMA ([facility])

## 2020-02-06 NOTE — PROGRESS NOTES
[facility] - Procedure Note    Date of Visit: 9/17/2019    Procedure performed: C7-T1 interlaminar epidural steroid injection with fluoroscopic guidance  Diagnosis: Cervical spondylosis; Cervical radiculitis/radiculopathy  : Payam Galo MD and Dr. Mosquera (pain fellow)  Anesthesia: none    Indications: Tracee Cooper is a 49 year old female who is seen  for cervical epidural steroid injection. The patient describes bilateral neck pain right greater than left. The patient has been exhibiting symptoms consistent with cervical intraspinal inflammation and radiculopathy. Symptoms have been persistent, disabling, and intermittently severe. The patient reports minimal improvement with conservative treatment, including meds/PT.    Cervical MRI Level by level:     C2-C3: There is no spinal canal or neural foraminal stenosis at this  level.     C3-C4: There is facet arthropathy and uncinate arthropathy  bilaterally. There is mild left foraminal narrowing but no spinal  canal or right foraminal stenosis.     C4-C5: There is facet arthropathy and uncinate arthropathy bilaterally  resulting in mild left foraminal narrowing. There is no right  foraminal or spinal canal narrowing.     C5-C6: There is facet arthropathy bilaterally, uncinate hypertrophy  bilaterally and a posterior broad-based disc-osteophyte complex with a  superimposed small posterior broad-based disc herniation (protrusion).  These findings result in moderate spinal canal stenosis with mild  flattening of the left anterior surface of the cervical spinal cord,  moderate left foraminal stenosis and mild right foraminal narrowing.     C6-C7: There is facet arthropathy bilaterally, uncinate hypertrophy  bilaterally and a posterior broad-based disc-osteophyte complex with a  superimposed small posterior broad-based disc herniation (protrusion).  These findings result in mild spinal canal narrowing and mild left  foraminal  narrowing but no right foraminal stenosis.     C7-T1: There is no spinal canal or neural foraminal stenosis at this  level.                                                                      IMPRESSION: Degenerative changes of the cervical spine as described  above.Allergies:    No Known Allergies     Vitals:  /87   Pulse 82     Review of Systems: The patient denies recent fever, chills, illness, use of antibiotics or anticoagulants. All other 10-point review of systems negative.     Procedure: The procedure and risks were explained, and informed written consent was obtained from the patient. Risks include but are not limited to: infection, bleeding, increased pain, and damage to soft tissue, nerve, muscle, and vasculature structures. After getting informed consent, patient was brought into the procedure suite and was placed in a prone position on the procedure table. A Pause for the Cause was performed. Patient was prepped and draped in sterile fashion.     The C7-T1 interspace was identified with use of fluoroscopy in AP view. A 25-gauge, 1.5 inch needle was used to anesthetize the skin and subcutaneous tissue entry site with a total of 2 ml of 1% lidocaine. Under fluoroscopic visualization, a 22-gauge, 3.5 inch Tuohy epidural needle was slowly advanced towards the epidural space a few millimeters right of midline. The latter part of the needle advancement was guided with fluoroscopy in the lateral view. The epidural space was identified using loss of resistance technique. After negative aspiration for heme and cerebrospinal fluid, a total of 1 mL of Omnipaque was injected to confirm needle placement. 9 mL of contrast was wasted. Epidurogram confirmed spread within the posterior epidural space. 2 ml of 10mg/ml of dexamethasone and 1 ml of preservative free 0.25% bupivacaine was injected. The needle was removed.  Images were saved to PACS.    The patient tolerated the procedure well, and there was no  evidence of procedural complications. No new sensory or motor deficits were noted following the procedure. The patient was stable and able to ambulate on discharge home. Post-procedure instructions were provided.     Pre-procedure pain score: 5/10 in the neck, 5/10 in the arm  Post-procedure pain score: 1/10 in the neck, 1/10 in the arm    Assessment/Plan: Tracee Cooper is a 49 year old female s/p cervical interlaminar epidural steroid injection today for cervical spondylosis and radiculitis/radiculopathy.     1. Following today's procedure, the patient was advised to contact the [facility] for any of the following:   Fever, chills, or night sweats   New onset of pain, numbness, or weakness   Any questions/concerns regarding the procedure  If unable to contact the Pain Center, the patient was instructed to go to a local Emergency Room for any complications.   2. The patient will receive a follow-up call in 1 week.   3. Follow-up with referring provider in 2 weeks for post-procedure evaluation.  I saw and examined the patient with the Pain Fellow/Resident. I have reviewed and agree with the resident's note and plan of care and made changes and corrections directly to the body of the note.   TIME SPENT:     BY MYSELF AND FELLOW/RESIDENT TOGETHER with fellow the entire procedure.    [facility]

## 2020-02-06 NOTE — NURSING NOTE
Discharge Information    IV Discontiued Time:  1555 intact     Amount of Fluid Infused:  SL    Discharge Criteria = When patient returns to baseline or as per MD order    Consciousness:  Pt is fully awake    Circulation:  BP +/- 20% of pre-procedure level    Respiration:  Patient is able to breathe deeply    O2 Sat:  Patient is able to maintain O2 Sat >92% on room air    Activity:  Moves 4 extremities on command    Ambulation:  Patient is able to stand and walk or stand and pivot into wheelchair    Dressing:  Clean/dry or No Dressing    Notes:   Discharge instructions and AVS given to patient    Patient meets criteria for discharge?  YES    Admitted to PCU?  No    Responsible adult present to accompany patient home?  Yes    Signature/Title:    Jacek Jiménez RN  RN Care Coordinator  [facility]

## 2020-02-06 NOTE — NURSING NOTE
20 gauge Peripheral IV inserted into right anticubital - attempts: 1    Portia Carpio, RN-BSN  [facility]

## 2020-02-12 DIAGNOSIS — I10 ESSENTIAL HYPERTENSION WITH GOAL BLOOD PRESSURE LESS THAN 140/90: ICD-10-CM

## 2020-02-12 NOTE — TELEPHONE ENCOUNTER
"Requested Prescriptions   Pending Prescriptions Disp Refills     metoprolol succinate ER (TOPROL-XL) 25 MG 24 hr tablet [Pharmacy Med Name: METOPROLOL ER SUCCINATE 25MG TABS]  Last Written Prescription Date:  1/16/20  Last Fill Quantity: 30,  # refills: 0   Last office visit: 1/16/2020 with prescribing provider:  holley   Future Office Visit:     30 tablet 0     Sig: TAKE 1 TABLET(25 MG) BY MOUTH DAILY       Beta-Blockers Protocol Failed - 2/12/2020  4:11 AM        Failed - Blood pressure under 140/90 in past 12 months     BP Readings from Last 3 Encounters:   02/06/20 (!) 160/84   01/16/20 (!) 140/82   12/27/19 (!) 140/90                 Passed - Patient is age 6 or older        Passed - Recent (12 mo) or future (30 days) visit within the authorizing provider's specialty     Patient has had an office visit with the authorizing provider or a provider within the authorizing providers department within the previous 12 mos or has a future within next 30 days. See \"Patient Info\" tab in inbasket, or \"Choose Columns\" in Meds & Orders section of the refill encounter.              Passed - Medication is active on med list          "

## 2020-02-13 ENCOUNTER — TELEPHONE (OUTPATIENT)
Dept: PALLIATIVE MEDICINE | Facility: CLINIC | Age: 50
End: 2020-02-13

## 2020-02-13 RX ORDER — METOPROLOL SUCCINATE 25 MG/1
TABLET, EXTENDED RELEASE ORAL
Qty: 30 TABLET | Refills: 0 | Status: SHIPPED | OUTPATIENT
Start: 2020-02-13 | End: 2020-03-19

## 2020-02-13 NOTE — TELEPHONE ENCOUNTER
Routing refill request to provider for review/approval because:  BP not at goal  Pt did not return to see CMA two weeks from 1/16/2020 appt for BP recheck as directed, however BP was taken at 2/6/2020 Pain Clinic appt (PAT injection) and systolic BP remained elevated    Stacie ARAIZA RN

## 2020-02-13 NOTE — TELEPHONE ENCOUNTER
Patient had a C7-T1 interlaminar epidural steroid injection with fluoroscopic guidance on 2/6/20.  Called patient for an update.      Left message that we were calling for an update about how s/he was doing after the injection.  LM that if s/he has any problems or questions to call the clinic at [phone].

## 2020-02-27 ENCOUNTER — MYC REFILL (OUTPATIENT)
Dept: FAMILY MEDICINE | Facility: CLINIC | Age: 50
End: 2020-02-27

## 2020-02-27 DIAGNOSIS — F41.1 GENERALIZED ANXIETY DISORDER: ICD-10-CM

## 2020-02-28 RX ORDER — ALPRAZOLAM 1 MG
1 TABLET ORAL 3 TIMES DAILY PRN
Qty: 20 TABLET | Refills: 0 | Status: SHIPPED | OUTPATIENT
Start: 2020-02-28 | End: 2020-03-19

## 2020-02-28 NOTE — TELEPHONE ENCOUNTER
Routing refill request to provider for review/approval because: Drug not on the [facility] refill protocol     Requested Prescriptions   Pending Prescriptions Disp Refills     ALPRAZolam (XANAX) 1 MG tablet 20 tablet 0     Sig: Take 1 tablet (1 mg) by mouth 3 times daily as needed for anxiety Use sparingly       There is no refill protocol information for this order        Last Written Prescription Date:  1/16/2020  Last Fill Quantity: 20,  # refills: 0   Last office visit: 1/16/2020 with prescribing provider:  DO Nghia   Future Office Visit:        Stacie ARAIZA RN

## 2020-03-19 ENCOUNTER — MYC REFILL (OUTPATIENT)
Dept: FAMILY MEDICINE | Facility: CLINIC | Age: 50
End: 2020-03-19

## 2020-03-19 DIAGNOSIS — F41.1 GENERALIZED ANXIETY DISORDER: ICD-10-CM

## 2020-03-19 DIAGNOSIS — I10 ESSENTIAL HYPERTENSION WITH GOAL BLOOD PRESSURE LESS THAN 140/90: ICD-10-CM

## 2020-03-19 RX ORDER — ALPRAZOLAM 1 MG
1 TABLET ORAL 3 TIMES DAILY PRN
Qty: 20 TABLET | Refills: 0 | Status: SHIPPED | OUTPATIENT
Start: 2020-03-19 | End: 2020-06-04

## 2020-03-19 RX ORDER — METOPROLOL SUCCINATE 25 MG/1
TABLET, EXTENDED RELEASE ORAL
Qty: 90 TABLET | Refills: 0 | Status: SHIPPED | OUTPATIENT
Start: 2020-03-19 | End: 2020-06-18

## 2020-03-19 NOTE — TELEPHONE ENCOUNTER
Most recent BP was following a C-spine injection, previously controlled.  Med refilled.    Louise Agrawal DO

## 2020-03-19 NOTE — TELEPHONE ENCOUNTER
Routing refill request to provider for review/approval because:  Drug not on the [facility] refill protocol     Patient Comment: I requesting a renewal of this prescription becasue I am not sure when more business will suddenly  get shut down.     Christianne Velasquez RN

## 2020-03-24 PROBLEM — M54.2 NECK PAIN: Status: RESOLVED | Noted: 2019-09-24 | Resolved: 2020-03-24

## 2020-03-24 PROBLEM — M54.12 CERVICAL RADICULITIS: Status: RESOLVED | Noted: 2019-09-24 | Resolved: 2020-03-24

## 2020-03-24 NOTE — PROGRESS NOTES
Discharge Note    Progress reporting period is from initial eval to Oct 23, 2019.     Tracee failed to return for next follow up visit and current status is unknown.  Please see information below for last relevant information on current status.  Patient seen for 3 visits.    SUBJECTIVE  Subjective changes noted by patient:  pt reports no pain going all the way down on the right. pt reports pain is now shooting down her left arm and into her wrist atleast 3 days ago.  .  Current pain level is  .     Previous pain level was  (pain with movement and pain down the left arm to wrist).   Changes in function:  Yes (See Goal flowsheet attached for changes in current functional level)  Adverse reaction to treatment or activity: None    OBJECTIVE  Changes noted in objective findings: Cervical AROM: nil loss rotation B. pain left rotation in the L arm. pain peripheralizating with left rotation and left side bending. nil loss flexion with peripheralization. mod loss extension with peripheralization LLE.     ASSESSMENT/PLAN  Diagnosis: Cervical radiculopathy   DIAGP:  Diagnoses of Neck pain and Cervical radiculitis were pertinent to this visit.  STG/LTGs have been met or progress has been made towards goals:  Yes, please see goal flowsheet for most current information  Assessment of Progress: current status is unknown.    Last current status:     Self Management Plans:  HEP  I have re-evaluated this patient and find that the nature, scope, duration and intensity of the therapy is appropriate for the medical condition of the patient.  Tracee continues to require the following intervention to meet STG and LTG's:  HEP.    Recommendations:  Discharge with current home program.  Patient to follow up with MD as needed.    Please refer to the daily flowsheet for treatment today, total treatment time and time spent performing 1:1 timed codes.

## 2020-04-01 ENCOUNTER — TELEPHONE (OUTPATIENT)
Dept: PSYCHOLOGY | Facility: CLINIC | Age: 50
End: 2020-04-01

## 2020-04-01 NOTE — TELEPHONE ENCOUNTER
Left voicemail informing of return from medical leave, and the ability for patient to schedule follow-up telehealth appointments at this time.

## 2020-05-09 DIAGNOSIS — I10 ESSENTIAL HYPERTENSION WITH GOAL BLOOD PRESSURE LESS THAN 140/90: ICD-10-CM

## 2020-05-11 RX ORDER — AMLODIPINE BESYLATE 10 MG/1
TABLET ORAL
Qty: 90 TABLET | Refills: 0 | Status: SHIPPED | OUTPATIENT
Start: 2020-05-11 | End: 2020-08-10

## 2020-05-11 NOTE — TELEPHONE ENCOUNTER
Routing refill request to provider for review/approval because:  Failed BP protocol  Krystal Mcgrath RN

## 2020-06-01 ENCOUNTER — TELEPHONE (OUTPATIENT)
Dept: ORTHOPEDICS | Facility: CLINIC | Age: 50
End: 2020-06-01

## 2020-06-01 DIAGNOSIS — M54.12 CERVICAL RADICULOPATHY: Primary | ICD-10-CM

## 2020-06-01 NOTE — TELEPHONE ENCOUNTER
Patient last seen 9/6/19  Injection done 9/17/19:C7-T1 interlaminar epidural steroid injection with fluoroscopic guidance  Saw Neurosurgery on 12/3/19, repeat injection ordered, performed on 2/6/20      Please advise  Alissa Collado MS ATC

## 2020-06-01 NOTE — TELEPHONE ENCOUNTER
Patient LVM requesting an order be placed for repeat neck injections. Would like a call back once the order has been placed.

## 2020-06-02 NOTE — TELEPHONE ENCOUNTER
Patient notes that she got good relief with the last injection done by Dr. Galo on 6/2/2020. She notes that the injection began to wear off ~ 2 weeks ago. She is reporting pain in both arms without numbness or tingling. She notes the numbness and tingling typically comes on as pain worsens. She does feel that she has some weakness in the arms. She feels that her symptoms are consistent with her pain prior to the last injection, only less painful.     She would prefer to see Dr. Galo again. Informed her that scheduling is at their advisement.     She expressed understanding with follow up plan.     PAT order placed with pain management per provider recommendations.     Chetna Flaherty M.Ed., LAT, ATC

## 2020-06-02 NOTE — TELEPHONE ENCOUNTER
2/6/20 Dr Galo C7-T1 IESI  9/17/19 Dr Galo C7-T1 IESI    Reviewed chart. Last seen Sept 2019. Since then saw neurosurgery Dec 2019, had PAT ordered. In total since last seen has had 2 injections, and physical therapy.    I'm ok with repeat PAT order, assuming her symptoms are the same as what she had that led to previous injections. Note this order will be triaged by pain mgmt. If injection done, then f/u in clinic 2-3 weeks after.    Alternatively, if she wants to connect with neurosurgery again, that's fine; they had ordered the last injection.    Thanks.    Jacek Marroquin, , CAQ

## 2020-06-03 ENCOUNTER — MYC REFILL (OUTPATIENT)
Dept: FAMILY MEDICINE | Facility: CLINIC | Age: 50
End: 2020-06-03

## 2020-06-03 DIAGNOSIS — F41.1 GENERALIZED ANXIETY DISORDER: ICD-10-CM

## 2020-06-03 NOTE — TELEPHONE ENCOUNTER
Routing refill request to provider for review/approval because:  Drug not on the [facility] refill protocol or controlled substance  PRosi Contreras  Clinic  RN/Phil Celeste

## 2020-06-04 ENCOUNTER — TELEPHONE (OUTPATIENT)
Dept: PALLIATIVE MEDICINE | Facility: CLINIC | Age: 50
End: 2020-06-04

## 2020-06-04 DIAGNOSIS — F41.1 GENERALIZED ANXIETY DISORDER: ICD-10-CM

## 2020-06-04 RX ORDER — ALPRAZOLAM 1 MG
TABLET ORAL
Qty: 20 TABLET | Refills: 0 | Status: SHIPPED | OUTPATIENT
Start: 2020-06-04 | End: 2020-07-29

## 2020-06-04 NOTE — TELEPHONE ENCOUNTER
"Screening Questions for Radiology Injections:    Injection to be done at which interventional clinic site? [facility]    Instruct patient to arrive as directed prior to the scheduled appointment time:    [place] minutes before      [place]: 30 minutes before; if IV needed 1 hour before     Dr. Roper-no IV needed for Cervical PAT; please instruct to arrive 30\" early    Procedure ordered by Dr. Gilbert    Procedure ordered? C7-T1 IESI    As a reminder, receiving steroids can decrease your body's ability to fight infection.   Would you still like to move forward with scheduling the injection?  Yes      Transforaminal Cervical PAT - no pain provider currently performing    What insurance would patient like us to bill for this procedure? BCBS      Worker's comp or MVA (motor vehicle accident) -Any injection DO NOT SCHEDULE and route to Jessenia Pearl.      [employer] insurance - For SI joint injections, DO NOT SCHEDULE and route Jessenia Pearl.       [employer] - Any injection besides hip/shoulder/knee joint DO NOT SCHEDULE and route to Jessenia Dae. She will obtain PA and call pt back to schedule procedure or notify pt of denial.       [employer]-Route to [place] for review      **BCBS- ALL need to be routed to [place] for review if a PA is needed**      IF SCHEDULING IN [place] AND NEEDS A PA, IT IS OKAY TO SCHEDULE. [place] HANDLES THEIR OWN PA'S AFTER THE PATIENT IS SCHEDULED. PLEASE SCHEDULE AT LEAST 1 WEEK OUT SO A PA CAN BE OBTAINED.    Any chance of pregnancy? NO   If YES, do NOT schedule and route to [facility]    Is an  needed? No     Patient has a drive home? (mandatory) YES: Informed    Is patient taking any blood thinners (i.e. plavix, coumadin, jantoven, warfarin, heparin, pradaxa or dabigatran, etc)? No   If hold needed, do NOT schedule, route to RN pool     Is patient taking any aspirin products (includes Excedrin and Fiorinal)? No     If more than 325mg/day, OK to " schedule; Instruct pt to decrease to less than 325 mg for 7 days AND route to RN pool    For CERVICAL procedures, hold all aspirin products for 6 days.     Tell pt that if aspirin product is not held for 6 days, the procedure WILL BE cancelled.      Does the patient have a bleeding or clotting disorder? No     If YES, okay to schedule AND route to RN nurse pool    For any patients with platelet count <100, must be forwarded to provider    Is patient diabetic?  No  If YES, instruct them to bring their glucometer.    Does patient have an active infection or treated for one within the past week? No     Is patient currently taking any antibiotics?  No     For patients on chronic, preventative, or prophylactic antibiotics, procedures may be scheduled.     For patients on antibiotics for active or recent infection:antibiotic course must have been completed for 4 days    Is patient currently taking any steroid medications? (i.e. Prednisone, Medrol)  No     For patients on steroid medications, course must have been completed for 4 days    Is patient actively being treated for cancer or immunocompromised? No  If YES, do NOT schedule and route to RN pool     Are you able to get on and off an exam table with minimal or no assistance? Yes  If NO, do NOT schedule and route to RN pool    Are you able to roll over and lay on your stomach with minimal or no assistance? Yes  If NO, do NOT schedule and route to RN pool     Any allergies to contrast dye, iodine, shellfish, or numbing and steroid medications? No  If YES, route to RN pool AND add allergy information to appointment notes    Allergies: Patient has no known allergies.      Has the patient had a flu shot or any other vaccinations within 7 days before or after the procedure.  No     Does patient have an MRI/CT?  YES: 2019  Check Procedure Scheduling Grid to see if required.      Was the MRI done within the last 3 years?  Yes    If yes, where was the MRI done i.e.[facility], [facility], [facility], [facility] etc? [facility]      If no, do not schedule and route to RN pool    If MRI was not done at [facility], [facility] or [facility] do NOT schedule and route to RN pool.      If pt has an imaging disc, the injection MAY be scheduled but pt has to bring disc to appt.     If they show up without the disc the injection cannot be done    Procedure Specific Instructions:      If celiac plexus block, informed patient NPO for 6 hours and that it is okay to take medications with sips of water, especially blood pressure medications  Not Applicable         If this is for a cervical procedure, informed patient that aspirin needs to be held for 6 days.   YES: Informed      If IV needed:    Do not schedule procedures requiring IV placement in the first appointment of the day or first appointment after lunch. Do NOT schedule at 0745, 0815 or 1245.     Instructed pt to arrive 30 minutes early for IV start if required. (Check Procedure Scheduling Grid)  YES: Informed    Reminders:      If you are started on any steroids or antibiotics between now and your appointment, you must contact us because the procedure may need to be cancelled.  Yes      For all procedures except radiofrequency ablations (RFAs) and spinal cord stimulator (SCS) trials, informed patient:    IV sedation is not provided for this procedure.  If you feel that an oral anti-anxiety medication is needed, you can discuss this further with your referring provider or primary care provider.  The Pain Clinic provider will discuss specifics of what the procedure includes at your appointment.  Most procedures last 10-20 minutes.  We use numbing medications to help with any discomfort during the procedure.  Not Applicable      For patients 85 or older we recommend having an adult stay w/ them for the remainder of the day.       Does the patient have any questions?  NO  Pao Martines  [facility]

## 2020-06-04 NOTE — TELEPHONE ENCOUNTER
Routing refill request to provider for review/approval because:  Drug not on the [facility], [facility] or [facility] refill protocol or controlled substance  Merna Davis RN

## 2020-06-04 NOTE — TELEPHONE ENCOUNTER
No PA required, okay to schedule        Jessenia LEE    [facility]

## 2020-06-06 ENCOUNTER — MYC MEDICAL ADVICE (OUTPATIENT)
Dept: FAMILY MEDICINE | Facility: CLINIC | Age: 50
End: 2020-06-06

## 2020-06-09 RX ORDER — ALPRAZOLAM 1 MG
1 TABLET ORAL 3 TIMES DAILY PRN
Qty: 20 TABLET | Refills: 0 | OUTPATIENT
Start: 2020-06-09

## 2020-06-15 DIAGNOSIS — I10 ESSENTIAL HYPERTENSION WITH GOAL BLOOD PRESSURE LESS THAN 140/90: ICD-10-CM

## 2020-06-17 NOTE — PROGRESS NOTES
[facility] - Procedure Note    Date of Visit: 6/18/2020    Procedure performed: T1-T2 interlaminar epidural steroid injection with fluoroscopic guidance  Diagnosis: Cervical spondylosis; Cervical radiculitis/radiculopathy  : Barbara Perry MD   Anesthesia: none    Indications: Tracee Cooper is a 49 year old female who is seen at the request of Dr. Marroquin for cervical epidural steroid injection. The patient describes neck pain with radiation into the left arm. The patient has been exhibiting symptoms consistent with cervical intraspinal inflammation and radiculopathy. Symptoms have been persistent, disabling, and intermittently severe. The patient reports minimal improvement with conservative treatment, including medications, previous injections and PT.    This is a repeat injection.  Previous C7-T1 SHANDA done by Dr. Galo on 9/17/2019 provided good pain relief for a period of 2 months.       Cervical MRI was done on 9/6/2019 which showed   FINDINGS: There is normal alignment of the cervical vertebrae;  however, there is straightening of normal cervical lordosis. Vertebral  body heights of the cervical spine are normal. Marrow signal  throughout the cervical vertebrae is within normal limits. There is no  evidence for fracture or pathologic bony lesion of the cervical spine.     There are posterior disc herniations at C5-C6 and C6-C7 levels. The  cervical intervertebral discs are otherwise within normal limits in  height, contour and signal intensity.     The cervical spinal cord is mildly deformed by degenerative changes at  the C5-C6 and C6-C7 levels. The cervical spinal cord is otherwise  normal in contour. There is no abnormal signal in the cervical spinal  cord. There is no evidence for intrathecal abnormality.     Level by level:     C2-C3: There is no spinal canal or neural foraminal stenosis at this  level.     C3-C4: There is facet arthropathy and uncinate  arthropathy  bilaterally. There is mild left foraminal narrowing but no spinal  canal or right foraminal stenosis.     C4-C5: There is facet arthropathy and uncinate arthropathy bilaterally  resulting in mild left foraminal narrowing. There is no right  foraminal or spinal canal narrowing.     C5-C6: There is facet arthropathy bilaterally, uncinate hypertrophy  bilaterally and a posterior broad-based disc-osteophyte complex with a  superimposed small posterior broad-based disc herniation (protrusion).  These findings result in moderate spinal canal stenosis with mild  flattening of the left anterior surface of the cervical spinal cord,  moderate left foraminal stenosis and mild right foraminal narrowing.     C6-C7: There is facet arthropathy bilaterally, uncinate hypertrophy  bilaterally and a posterior broad-based disc-osteophyte complex with a  superimposed small posterior broad-based disc herniation (protrusion).  These findings result in mild spinal canal narrowing and mild left  foraminal narrowing but no right foraminal stenosis.     C7-T1: There is no spinal canal or neural foraminal stenosis at this  level.                                                                   IMPRESSION: Degenerative changes of the cervical spine as described  above.    Allergies:    No Known Allergies     Vitals:  BP (!) 164/95 (BP Location: Left arm, Cuff Size: Adult Regular)   Pulse 80   SpO2 97%     Review of Systems: The patient denies recent fever, chills, illness, use of antibiotics or anticoagulants. All other 10-point review of systems negative.     Procedure: The procedure and risks were explained, and informed written consent was obtained from the patient. Risks include but are not limited to: infection, bleeding, increased pain, and damage to soft tissue, nerve, muscle, and vasculature structures. After getting informed consent, patient was brought into the procedure suite and was placed in a prone position on the  procedure table. A Pause for the Cause was performed. Patient was prepped and draped in sterile fashion.     The C7-T1 interspace was identified with use of fluoroscopy in AP view. A 25-gauge, 1.5 inch needle was used to anesthetize the skin and subcutaneous tissue entry site with a total of 2 ml of 1% lidocaine. Under fluoroscopic visualization, a 22-gauge, 3.5 inch Tuohy epidural needle was slowly advanced towards the epidural space a few millimeters left of midline. The latter part of the needle advancement was guided with fluoroscopy in the lateral view. The epidural space was identified using loss of resistance technique. After negative aspiration for heme and cerebrospinal fluid, a total of 1 mL of non-ionic contrast was injected to confirm needle placement. 9 mL of contrast was wasted. Epidurogram confirmed spread within the posterior epidural space. 2 ml of 10mg/ml of dexamethasone and 1 ml of preservative free 1% lidocaine was injected. The needle was removed.  Images were saved to PACS.    The patient tolerated the procedure well, and there was no evidence of procedural complications. No new sensory or motor deficits were noted following the procedure. The patient was stable and able to ambulate on discharge home. Post-procedure instructions were provided.     Pre-procedure pain score: 7/10 in the neck, 6/10 in the arm  Post-procedure pain score: 7/10 in the neck, 8/10 in the arm    Assessment/Plan: Tracee Cooper is a 49 year old female s/p cervical interlaminar epidural steroid injection today for cervical spondylosis and radiculitis/radiculopathy.     1. Following today's procedure, the patient was advised to contact the Pain Management Center for any of the following:   Fever, chills, or night sweats   New onset of pain, numbness, or weakness   Any questions/concerns regarding the procedure  If unable to contact the Pain Center, the patient was instructed to go to a local Emergency Room for any  complications.   2. The patient will receive a follow-up call in 1 week.   3. Follow-up with the referring provider in 2 weeks for post-procedure evaluation.    LISSA TERRY MD   Pain Management & Addiction Medicine

## 2020-06-18 ENCOUNTER — RADIOLOGY INJECTION OFFICE VISIT (OUTPATIENT)
Dept: PALLIATIVE MEDICINE | Facility: CLINIC | Age: 50
End: 2020-06-18
Payer: COMMERCIAL

## 2020-06-18 ENCOUNTER — ANCILLARY PROCEDURE (OUTPATIENT)
Dept: GENERAL RADIOLOGY | Facility: CLINIC | Age: 50
End: 2020-06-18
Attending: ANESTHESIOLOGY
Payer: COMMERCIAL

## 2020-06-18 VITALS — DIASTOLIC BLOOD PRESSURE: 95 MMHG | OXYGEN SATURATION: 97 % | SYSTOLIC BLOOD PRESSURE: 164 MMHG | HEART RATE: 80 BPM

## 2020-06-18 DIAGNOSIS — M54.12 CERVICAL RADICULOPATHY: Primary | ICD-10-CM

## 2020-06-18 DIAGNOSIS — M54.12 CERVICAL RADICULOPATHY: ICD-10-CM

## 2020-06-18 PROCEDURE — 62321 NJX INTERLAMINAR CRV/THRC: CPT | Performed by: ANESTHESIOLOGY

## 2020-06-18 RX ORDER — METOPROLOL SUCCINATE 25 MG/1
TABLET, EXTENDED RELEASE ORAL
Qty: 90 TABLET | Refills: 0 | Status: SHIPPED | OUTPATIENT
Start: 2020-06-18 | End: 2020-06-24

## 2020-06-18 NOTE — TELEPHONE ENCOUNTER
Routing refill request to provider for review/approval because:  Failed BP parameters  Krystal Mcgrath RN

## 2020-06-18 NOTE — PATIENT INSTRUCTIONS
[facility] Procedure Discharge Instructions    Today you saw:   Dr. Barbara Perry     Your procedure:  Epidural steroid injection       Medications used:  Lidocaine (anesthetic)   Dexamethasone (steroid)      Omnipaque (contrast)    Normal Saline            Do not drive for 6 hours. The effect of the local anesthetic could slow your reflexes.     Avoid strenuous activity for the first 24 hours. You may resume your regular activities after that.     You may shower, however avoid swimming, tub baths or hot tubs for 24 hours following your procedure    You may have a mild to moderate increase in pain for several days following the injection.      You may use ice packs for 10-15 minutes, 3 to 4 times a day at the injection site for comfort    Do not use heat to painful areas for 6 to 8 hours. This will give the local anesthetic time to wear off and prevent you from accidentally burning your skin.    Unless you have been directed to avoid the use of anti-inflammatory medications (NSAIDS-ibuprofen, Aleve, Motrin), you may use these medications or Tylenol for pain control if needed.     With diabetes, check your blood sugar more frequently than usual as your blood sugar may be higher than normal for 10-14 days following a steroid injection. Contact your doctor who manages your diabetes if your blood sugar is higher than usual    Possible side effects of steroids that you may experience include flushing, elevated blood pressure, increased appetite, mild headaches and restlessness.  All of these symptoms will get better with time.    It may take up to 14 days for the steroid medication to start working although you may feel the effect as early as a few days after the procedure.     Follow up with your referring provider in 2-3 weeks      If you experience any of the following, call the pain center line during work hours at [phone] or on-call physician after hours at [phone]:  -Fever over 100  degree F  -Swelling, bleeding, redness, drainage, warmth at the injection site  -Progressive weakness or numbness in your  arms  -Unusual headache that is not relieved by Tylenol or your regular headache medication  -Unusual new onset of pain that is not improving

## 2020-06-18 NOTE — NURSING NOTE
Discharge Information    IV Discontiued Time:  NA    Amount of Fluid Infused:  NA    Discharge Criteria = When patient returns to baseline or as per MD order    Consciousness:  Pt is fully awake    Circulation:  BP +/- 20% of pre-procedure level    Respiration:  Patient is able to breathe deeply    O2 Sat:  Patient is able to maintain O2 Sat >92% on room air    Activity:  Moves 4 extremities on command    Ambulation:  Patient is able to stand and walk or stand and pivot into wheelchair    Dressing:  Clean/dry or No Dressing    Notes:   Discharge instructions and AVS given to patient    Patient meets criteria for discharge?  YES    Admitted to PCU?  No    Responsible adult present to accompany patient home?  Yes    Signature/Title:    Caryl Pino RN Care Coordinator  [facility] [place]

## 2020-06-19 NOTE — TELEPHONE ENCOUNTER
Please ask pt to get in for BP check when she is able.  She had cervical procedure today    Louise Agrawal, DO

## 2020-06-22 NOTE — TELEPHONE ENCOUNTER
Pt was notified of need for BP check, she will call in with her BP readings in a week.     Yeimy Adair, [place]

## 2020-06-23 ENCOUNTER — MYC MEDICAL ADVICE (OUTPATIENT)
Dept: FAMILY MEDICINE | Facility: CLINIC | Age: 50
End: 2020-06-23

## 2020-06-23 DIAGNOSIS — I10 ESSENTIAL HYPERTENSION WITH GOAL BLOOD PRESSURE LESS THAN 140/90: ICD-10-CM

## 2020-06-24 RX ORDER — METOPROLOL SUCCINATE 25 MG/1
50 TABLET, EXTENDED RELEASE ORAL DAILY
Qty: 90 TABLET | Refills: 0 | COMMUNITY
Start: 2020-06-24 | End: 2020-10-01 | Stop reason: SINTOL

## 2020-06-26 ENCOUNTER — TELEPHONE (OUTPATIENT)
Dept: PALLIATIVE MEDICINE | Facility: CLINIC | Age: 50
End: 2020-06-26

## 2020-06-26 NOTE — TELEPHONE ENCOUNTER
Patient had a T1-T2 interlaminar epidural steroid injection on 6/18/20.  Called patient for an update.      Left message that we were calling for an update about how she was doing after the injection.  LM that if she has any problems or questions to call the clinic at [phone].

## 2020-07-06 ENCOUNTER — TELEPHONE (OUTPATIENT)
Dept: FAMILY MEDICINE | Facility: CLINIC | Age: 50
End: 2020-07-06

## 2020-07-06 NOTE — TELEPHONE ENCOUNTER
Patient reports that she has swelling in both feet and ankles. She is able to walk, but does feel pain due to the swelling. Swelling started 4 days ago. She is currently taking amlodipine 10 mg daily and metoprolol 50 mg daily. Her metoprolol was increased a week ago. Patient thought the swelling started shortly after the increase so she decreased metoprolol down to 1 tablet (25 mg). She did this 2 days ago. Patient is not short of breath. She has an appointment scheduled on 7.8.2020. Advised to elevate feet when sitting, limit salt intake, keep appointment. If becoming short of breath, she will need to be seen in the ED. Patient agreed.   Patient is wondering if ok to still stay on the 25 mg dose of metoprolol. Will route to provider. If new prescription is needed, she would like it to be sent to [facility] on [address].  Merna Davis RN

## 2020-07-06 NOTE — TELEPHONE ENCOUNTER
Pt is calling and states that her legs are swollen and its moving up both legs, and wants to talk to a nurse, please triage.     Yeimy Adair, [place]

## 2020-07-07 NOTE — TELEPHONE ENCOUNTER
Call placed to patient.  Relayed message per JULIA Love.  Patient agrees with plan.  Krystal Mcgrath RN

## 2020-07-10 ENCOUNTER — VIRTUAL VISIT (OUTPATIENT)
Dept: PSYCHOLOGY | Facility: CLINIC | Age: 50
End: 2020-07-10
Payer: COMMERCIAL

## 2020-07-10 DIAGNOSIS — F41.1 GENERALIZED ANXIETY DISORDER: Primary | ICD-10-CM

## 2020-07-10 PROCEDURE — 90834 PSYTX W PT 45 MINUTES: CPT | Mod: 95 | Performed by: COUNSELOR

## 2020-07-10 ASSESSMENT — ANXIETY QUESTIONNAIRES
IF YOU CHECKED OFF ANY PROBLEMS ON THIS QUESTIONNAIRE, HOW DIFFICULT HAVE THESE PROBLEMS MADE IT FOR YOU TO DO YOUR WORK, TAKE CARE OF THINGS AT HOME, OR GET ALONG WITH OTHER PEOPLE: SOMEWHAT DIFFICULT
5. BEING SO RESTLESS THAT IT IS HARD TO SIT STILL: NOT AT ALL
1. FEELING NERVOUS, ANXIOUS, OR ON EDGE: SEVERAL DAYS
GAD7 TOTAL SCORE: 3
2. NOT BEING ABLE TO STOP OR CONTROL WORRYING: SEVERAL DAYS
6. BECOMING EASILY ANNOYED OR IRRITABLE: NOT AT ALL
3. WORRYING TOO MUCH ABOUT DIFFERENT THINGS: NOT AT ALL
7. FEELING AFRAID AS IF SOMETHING AWFUL MIGHT HAPPEN: NOT AT ALL

## 2020-07-10 ASSESSMENT — PATIENT HEALTH QUESTIONNAIRE - PHQ9
5. POOR APPETITE OR OVEREATING: SEVERAL DAYS
SUM OF ALL RESPONSES TO PHQ QUESTIONS 1-9: 3

## 2020-07-10 NOTE — PROGRESS NOTES
Progress Note    Patient Name: Tracee Cooper  Date: 7/10/20         Service Type: Individual  Video Visit: No     Session Start Time: 1pm  Session End Time: 1:45     Session Length: 45    Session #: 1    Attendees: Client attended alone     Treatment Plan Last Reviewed: 12/16/19 (Review by 3/16/20)  CGI: 12/16/19  PHQ-9 / ORVILLE-7 : 3/3        DATA  Interactive Complexity: No  Crisis: No       Progress Since Last Session (Related to Symptoms / Goals / Homework):   Symptoms: Improving      Homework: Achieved / completed to satisfaction      Episode of Care Goals: Satisfactory progress - ACTION (Actively working towards change); Intervened by reinforcing change plan / affirming steps taken     Current / Ongoing Stressors and Concerns:   Ongoing: Anxiety and panic attacks while driving   Current: Discussed ongoing anxiety around driving     Treatment Objective(s) Addressed in This Session:   Patient will use cognitive strategies identified in therapy to challenge anxious thoughts.     Intervention:   CBT: Reinforced effective practice of exposure, cognitive challenges, and body relaxation. Discussed continued ways in which to generalize these skills to manage anxiety and panic. Coached on mindfulness practice to assist with overall anxiety and tension.        ASSESSMENT: Current Emotional / Mental Status (status of significant symptoms):   Risk status (Self / Other harm or suicidal ideation)   Patient denies current fears or concerns for personal safety.   Patient denies current or recent suicidal ideation or behaviors.   Patientdenies current or recent homicidal ideation or behaviors.   Patient denies current or recent self injurious behavior or ideation.   Patient denies other safety concerns.   Patient Patient reports there has been no change in risk factors since their last session.     PatientPatient reports there has been no change in protective factors since their last  session.     Recommended that patient call 911 or go to the local ED should there be a change in any of these risk factors.     Appearance:   Appropriate    Eye Contact:   Good    Psychomotor Behavior: Normal    Attitude:   Cooperative    Orientation:   All   Speech    Rate / Production: Normal     Volume:  Normal    Mood:    Anxious  Depressed    Affect:    Appropriate    Thought Content:  Clear    Thought Form:  Coherent  Logical    Insight:    Good      Medication Review:   No changes to current psychiatric medication(s)     Medication Compliance:   Yes     Changes in Health Issues:   None reported     Chemical Use Review:   Substance Use: Chemical use reviewed, no active concerns identified      Tobacco Use: No current tobacco use.      Diagnosis:  1. Generalized anxiety disorder         Collateral Reports Completed:   Not Applicable    PLAN: (Patient Tasks / Therapist Tasks / Other)  HOMEWORK: Practice mindfulness daily    Eunice Mixon MA, [facility]                                                          ______________________________________________________________________    Treatment Plan    Patient's Name: Tracee Cooper  YOB: 1970    Date: 12/16/19    Diagnoses:  300.02 (F41.1) Generalized Anxiety Disorder   Rule out Posttraumatic Stress Disorder  Psychosocial & Contextual Factors: Past trauma, children's mental health concerns, work stressors  WHODAS: Completed    Referral / Collaboration:  Referral to another professional/service is not indicated at this time.    Anticipated number of session or this episode of care: 20      MeasurableTreatment Goal(s) related to diagnosis / functional impairment(s)  Goal 1: Patient will decrease anxiety levels as evidence by ORVILLE-7 scores, and client report.    I will know I've met my goal when I have less panic attacks, when I can drive to a new place without feeling anxious or panicked.      Objective #A (Patient Action)    Patient will identify 3  initial signs or symptoms of anxiety.  Status: Continued - Date(s): 12/16/19    Intervention(s)  Therapist will assign homework    teach emotional recognition/identification.      Objective #B  Patient will use cognitive strategies identified in therapy to challenge anxious thoughts.  Status: Continued - Date(s): 12/16/19    Intervention(s)  Therapist will assign homework    teach CBT techniques to identify and challenge cognitive distortions.    Objective #C  Patient will identify three distraction and diversion activities and use those activities to decrease level of anxiety  .  Status: Continued - Date(s): 12/16/19    Intervention(s)  Therapist will assign homework    teach DBT techniques including Mindfulness and Distress Tolerance.        Patient has reviewed and agreed to the above plan.      Eunice Mixon MA, [facility]  7/10/20

## 2020-07-11 ASSESSMENT — ANXIETY QUESTIONNAIRES: GAD7 TOTAL SCORE: 3

## 2020-07-17 ENCOUNTER — VIRTUAL VISIT (OUTPATIENT)
Dept: PSYCHOLOGY | Facility: CLINIC | Age: 50
End: 2020-07-17
Payer: COMMERCIAL

## 2020-07-17 DIAGNOSIS — F41.0 PANIC DISORDER WITHOUT AGORAPHOBIA: ICD-10-CM

## 2020-07-17 DIAGNOSIS — F41.1 GENERALIZED ANXIETY DISORDER: Primary | ICD-10-CM

## 2020-07-17 PROCEDURE — 90834 PSYTX W PT 45 MINUTES: CPT | Mod: 95 | Performed by: COUNSELOR
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
## 2020-07-24 ENCOUNTER — VIRTUAL VISIT (OUTPATIENT)
Dept: PSYCHOLOGY | Facility: CLINIC | Age: 50
End: 2020-07-24
Payer: COMMERCIAL

## 2020-07-24 DIAGNOSIS — F41.0 PANIC DISORDER WITHOUT AGORAPHOBIA: Primary | ICD-10-CM

## 2020-07-24 PROCEDURE — 90834 PSYTX W PT 45 MINUTES: CPT | Mod: 95 | Performed by: COUNSELOR

## 2020-07-24 NOTE — PROGRESS NOTES
Progress Note    Patient Name: Tracee Cooper  Date: 7/24/20         Service Type: Individual  Video Visit: No     Session Start Time: 2pm  Session End Time: 2:45     Session Length: 45    Session #: 3    Attendees: Client attended alone     Treatment Plan Last Reviewed: 12/16/19 (Review by 3/16/20)  CGI: 12/16/19  PHQ-9 / ORVILLE-7 : ---        DATA  Interactive Complexity: No  Crisis: No       Progress Since Last Session (Related to Symptoms / Goals / Homework):   Symptoms: Improving      Homework: Achieved / completed to satisfaction      Episode of Care Goals: Satisfactory progress - ACTION (Actively working towards change); Intervened by reinforcing change plan / affirming steps taken     Current / Ongoing Stressors and Concerns:   Ongoing: Anxiety and panic attacks while driving   Current: Discussed ongoing anxiety while driving, specifically around having a panic attack.      Treatment Objective(s) Addressed in This Session:   Patient will use cognitive strategies identified in therapy to challenge anxious thoughts.     Intervention:   CBT: Discussed passed progress with managing anxiety and panic. Generalized past skill use to current experience. Reflected pattern of anxiety triggers, specifically around safety. Explored how trauma history may influence this fear. Coached on skills and cognitive reframing to assist with management.         ASSESSMENT: Current Emotional / Mental Status (status of significant symptoms):   Risk status (Self / Other harm or suicidal ideation)   Patient denies current fears or concerns for personal safety.   Patient denies current or recent suicidal ideation or behaviors.   Patientdenies current or recent homicidal ideation or behaviors.   Patient denies current or recent self injurious behavior or ideation.   Patient denies other safety concerns.   Patient Patient reports there has been no change in risk factors since their last session.      PatientPatient reports there has been no change in protective factors since their last session.     Recommended that patient call 911 or go to the local ED should there be a change in any of these risk factors.     Appearance:   Appropriate    Eye Contact:   Good    Psychomotor Behavior: Normal    Attitude:   Cooperative    Orientation:   All   Speech    Rate / Production: Normal     Volume:  Normal    Mood:    Anxious  Depressed    Affect:    Appropriate    Thought Content:  Clear    Thought Form:  Coherent  Logical    Insight:    Good      Medication Review:   No changes to current psychiatric medication(s)     Medication Compliance:   Yes     Changes in Health Issues:   None reported     Chemical Use Review:   Substance Use: Chemical use reviewed, no active concerns identified      Tobacco Use: No current tobacco use.      Diagnosis:  1. Panic disorder without agoraphobia         Collateral Reports Completed:   Not Applicable    PLAN: (Patient Tasks / Therapist Tasks / Other)  HOMEWORK: Practice skills discussed in session to challenge anxious thoughts.    Eunice Mixon MA, [facility]                                                          ______________________________________________________________________    Treatment Plan    Patient's Name: Tracee Cooper  YOB: 1970    Date: 12/16/19    Diagnoses:  300.02 (F41.1) Generalized Anxiety Disorder   (f41.0) Panic disorder without agoraphobia  Rule out Posttraumatic Stress Disorder  Psychosocial & Contextual Factors: Past trauma, children's mental health concerns, work stressors  WHODAS: Completed    Referral / Collaboration:  Referral to another professional/service is not indicated at this time.    Anticipated number of session or this episode of care: 20      MeasurableTreatment Goal(s) related to diagnosis / functional impairment(s)  Goal 1: Patient will decrease anxiety levels as evidence by ORVILLE-7 scores, and client report.    I will know  I've met my goal when I have less panic attacks, when I can drive to a new place without feeling anxious or panicked.      Objective #A (Patient Action)    Patient will identify 3 initial signs or symptoms of anxiety.  Status: Continued - Date(s): 12/16/19    Intervention(s)  Therapist will assign homework    teach emotional recognition/identification.      Objective #B  Patient will use cognitive strategies identified in therapy to challenge anxious thoughts.  Status: Continued - Date(s): 12/16/19    Intervention(s)  Therapist will assign homework    teach CBT techniques to identify and challenge cognitive distortions.    Objective #C  Patient will identify three distraction and diversion activities and use those activities to decrease level of anxiety  .  Status: Continued - Date(s): 12/16/19    Intervention(s)  Therapist will assign homework    teach DBT techniques including Mindfulness and Distress Tolerance.        Patient has reviewed and agreed to the above plan.      Eunice Mixon MA, [facility]  7/24/20

## 2020-07-29 ENCOUNTER — MYC REFILL (OUTPATIENT)
Dept: FAMILY MEDICINE | Facility: CLINIC | Age: 50
End: 2020-07-29

## 2020-07-29 ENCOUNTER — MYC MEDICAL ADVICE (OUTPATIENT)
Dept: FAMILY MEDICINE | Facility: CLINIC | Age: 50
End: 2020-07-29

## 2020-07-29 DIAGNOSIS — I10 ESSENTIAL HYPERTENSION WITH GOAL BLOOD PRESSURE LESS THAN 140/90: Primary | ICD-10-CM

## 2020-07-29 DIAGNOSIS — F41.1 GENERALIZED ANXIETY DISORDER: ICD-10-CM

## 2020-07-29 RX ORDER — ALPRAZOLAM 1 MG
TABLET ORAL
Qty: 20 TABLET | Refills: 0 | Status: SHIPPED | OUTPATIENT
Start: 2020-07-29 | End: 2020-09-11

## 2020-07-30 RX ORDER — LOSARTAN POTASSIUM 25 MG/1
25 TABLET ORAL DAILY
Qty: 30 TABLET | Refills: 0 | Status: SHIPPED | OUTPATIENT
Start: 2020-07-30 | End: 2020-08-28

## 2020-07-31 RX ORDER — VENLAFAXINE HYDROCHLORIDE 150 MG/1
CAPSULE, EXTENDED RELEASE ORAL
Qty: 90 CAPSULE | Refills: 1 | Status: SHIPPED | OUTPATIENT
Start: 2020-07-31 | End: 2020-12-15

## 2020-08-07 ENCOUNTER — VIRTUAL VISIT (OUTPATIENT)
Dept: PSYCHOLOGY | Facility: CLINIC | Age: 50
End: 2020-08-07
Payer: COMMERCIAL

## 2020-08-07 DIAGNOSIS — F41.0 PANIC DISORDER WITHOUT AGORAPHOBIA: Primary | ICD-10-CM

## 2020-08-07 PROCEDURE — 90834 PSYTX W PT 45 MINUTES: CPT | Mod: 95 | Performed by: COUNSELOR
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
## 2020-08-10 DIAGNOSIS — I10 ESSENTIAL HYPERTENSION WITH GOAL BLOOD PRESSURE LESS THAN 140/90: ICD-10-CM

## 2020-08-10 RX ORDER — AMLODIPINE BESYLATE 10 MG/1
TABLET ORAL
Qty: 90 TABLET | Refills: 0 | Status: SHIPPED | OUTPATIENT
Start: 2020-08-10 | End: 2020-11-23

## 2020-08-10 NOTE — TELEPHONE ENCOUNTER
Routing refill request to provider for review/approval because:  Labs not current:  Cr  BP not at goal  Creatinine   Date Value Ref Range Status   06/03/2019 0.68 0.52 - 1.04 mg/dL Final     BP Readings from Last 6 Encounters:   06/18/20 (!) 164/95   02/06/20 (!) 160/84   01/16/20 (!) 140/82   12/27/19 (!) 140/90   12/03/19 (!) 157/98   10/15/19 (!) 142/90       Stacie ARAIZA, RN, BSN

## 2020-08-11 NOTE — TELEPHONE ENCOUNTER
Med refilled.    Please call pt.  She is due to follow up with BP check and blood work after beginning the new BP medicine.  Please help her to schedule    Louise Agrawal DO

## 2020-08-14 ENCOUNTER — VIRTUAL VISIT (OUTPATIENT)
Dept: PSYCHOLOGY | Facility: CLINIC | Age: 50
End: 2020-08-14
Payer: COMMERCIAL

## 2020-08-14 DIAGNOSIS — F41.0 PANIC DISORDER WITHOUT AGORAPHOBIA: Primary | ICD-10-CM

## 2020-08-14 PROCEDURE — 90834 PSYTX W PT 45 MINUTES: CPT | Mod: 95 | Performed by: COUNSELOR

## 2020-08-14 NOTE — PROGRESS NOTES
Progress Note    Patient Name: Tracee Cooper  Date: 8/14/20         Service Type: Individual  Video Visit: No     Session Start Time: 7:30am  Session End Time: 8:15     Session Length: 45    Session #: 5    Attendees: Client attended alone     Treatment Plan Last Reviewed: 12/16/19 (Review by 3/16/20)  CGI: 12/16/19  PHQ-9 / ORVILLE-7 : ---        DATA  Interactive Complexity: No  Crisis: No       Progress Since Last Session (Related to Symptoms / Goals / Homework):   Symptoms: No change      Homework: Achieved / completed to satisfaction      Episode of Care Goals: Satisfactory progress - ACTION (Actively working towards change); Intervened by reinforcing change plan / affirming steps taken     Current / Ongoing Stressors and Concerns:   Ongoing: Anxiety and panic attacks while driving   Current: Discussed ongoing anxiety around driving, although notes she continues to drive even when uncomfortable. Discussed upcoming week vacation with in-laws, and worries around dynamics with her sister-in-laws that historically has led to conflict.     Treatment Objective(s) Addressed in This Session:   Patient will use cognitive strategies identified in therapy to challenge anxious thoughts.      Intervention:   CBT: Reinforced effective use of skills to maintain driving even when uncomfortable. Offered psychoeducation around Cognitive [place], and ways to alter ineffective thinking patterns. Discussed use of affirmations, warning signs to symptom escalation, and ways to take an effective break when needed.         ASSESSMENT: Current Emotional / Mental Status (status of significant symptoms):   Risk status (Self / Other harm or suicidal ideation)   Patient denies current fears or concerns for personal safety.   Patient denies current or recent suicidal ideation or behaviors.   Patientdenies current or recent homicidal ideation or behaviors.   Patient denies current or recent self  injurious behavior or ideation.   Patient denies other safety concerns.   Patient Patient reports there has been no change in risk factors since their last session.     PatientPatient reports there has been no change in protective factors since their last session.     Recommended that patient call 911 or go to the local ED should there be a change in any of these risk factors.     Appearance:   Appropriate    Eye Contact:   Good    Psychomotor Behavior: Normal    Attitude:   Cooperative    Orientation:   All   Speech    Rate / Production: Normal     Volume:  Normal    Mood:    Anxious  Depressed    Affect:    Appropriate    Thought Content:  Clear    Thought Form:  Coherent  Logical    Insight:    Good      Medication Review:   No changes to current psychiatric medication(s)     Medication Compliance:   Yes     Changes in Health Issues:   None reported     Chemical Use Review:   Substance Use: Chemical use reviewed, no active concerns identified      Tobacco Use: No current tobacco use.      Diagnosis:  1. Panic disorder without agoraphobia         Collateral Reports Completed:   Not Applicable    PLAN: (Patient Tasks / Therapist Tasks / Other)  HOMEWORK: Positive affirmation jc. Practice noticing negative self-talk, respond with 3 affirmations. Make a cope ahead plan for possible anxiety during vacation.      Eunice Mixon MA, [facility]                                                          ______________________________________________________________________    Treatment Plan    Patient's Name: Tracee Cooper  YOB: 1970    Date: 12/16/19    Diagnoses:  300.02 (F41.1) Generalized Anxiety Disorder   (f41.0) Panic disorder without agoraphobia  Rule out Posttraumatic Stress Disorder  Psychosocial & Contextual Factors: Past trauma, children's mental health concerns, work stressors  WHODAS: Completed    Referral / Collaboration:  Referral to another professional/service is not indicated at this  time.    Anticipated number of session or this episode of care: 20      MeasurableTreatment Goal(s) related to diagnosis / functional impairment(s)  Goal 1: Patient will decrease anxiety levels as evidence by ORVILLE-7 scores, and client report.    I will know I've met my goal when I have less panic attacks, when I can drive to a new place without feeling anxious or panicked.      Objective #A (Patient Action)    Patient will identify 3 initial signs or symptoms of anxiety.  Status: Continued - Date(s): 12/16/19    Intervention(s)  Therapist will assign homework    teach emotional recognition/identification.      Objective #B  Patient will use cognitive strategies identified in therapy to challenge anxious thoughts.  Status: Continued - Date(s): 12/16/19    Intervention(s)  Therapist will assign homework    teach CBT techniques to identify and challenge cognitive distortions.    Objective #C  Patient will identify three distraction and diversion activities and use those activities to decrease level of anxiety  .  Status: Continued - Date(s): 12/16/19    Intervention(s)  Therapist will assign homework    teach DBT techniques including Mindfulness and Distress Tolerance.        Patient has reviewed and agreed to the above plan.      Eunice Mixon MA, [facility]  8/14/20

## 2020-09-02 ENCOUNTER — MYC MEDICAL ADVICE (OUTPATIENT)
Dept: FAMILY MEDICINE | Facility: CLINIC | Age: 50
End: 2020-09-02

## 2020-09-02 NOTE — TELEPHONE ENCOUNTER
Patient was last seen in clinic on 1/16/20. She was prescribed losartan 25 mg on 8.28.20 and also taking metoprolol 25 mg. Would you like patient to be seen in clinic or address this through a virtual visit? Routed to provider to address diuretic request.  Merna Davis RN

## 2020-09-11 ENCOUNTER — MYC REFILL (OUTPATIENT)
Dept: FAMILY MEDICINE | Facility: CLINIC | Age: 50
End: 2020-09-11

## 2020-09-11 DIAGNOSIS — F41.1 GENERALIZED ANXIETY DISORDER: ICD-10-CM

## 2020-09-15 RX ORDER — ALPRAZOLAM 1 MG
TABLET ORAL
Qty: 20 TABLET | Refills: 0 | Status: SHIPPED | OUTPATIENT
Start: 2020-09-15 | End: 2020-12-03

## 2020-09-23 ENCOUNTER — VIRTUAL VISIT (OUTPATIENT)
Dept: PSYCHOLOGY | Facility: CLINIC | Age: 50
End: 2020-09-23
Payer: COMMERCIAL

## 2020-09-23 DIAGNOSIS — F41.0 PANIC DISORDER WITHOUT AGORAPHOBIA: ICD-10-CM

## 2020-09-23 DIAGNOSIS — F41.1 GENERALIZED ANXIETY DISORDER: Primary | ICD-10-CM

## 2020-09-23 PROCEDURE — 90837 PSYTX W PT 60 MINUTES: CPT | Performed by: COUNSELOR

## 2020-09-23 NOTE — PROGRESS NOTES
"                                           Progress Note    Patient Name: Tracee Cooper  Date: 9/23/20         Service Type: Individual  Video Visit: No     Session Start Time: 1:30 pm  Session End Time: 2:25     Session Length: 55    Session #: 6    Attendees: Client attended alone     Treatment Plan Last Reviewed: 12/16/19 (Review by 3/16/20)  CGI: 12/16/19  PHQ-9 / ORVILLE-7 : ---        DATA  Interactive Complexity: No  Crisis: No       Progress Since Last Session (Related to Symptoms / Goals / Homework):   Symptoms: Improving - client notes improvement with anxiety since stressor of child's distance learning has been solved    Homework: Achieved / completed to satisfaction      Episode of Care Goals: Satisfactory progress - ACTION (Actively working towards change); Intervened by reinforcing change plan / affirming steps taken     Current / Ongoing Stressors and Concerns:   Ongoing: Anxiety and panic attacks while driving   Current: Discussed significant anxiety spike between sessions around having to homeschool son. Client reports advocating for son to be able to increase supports for his special needs, which was able to be accommodated started last week. She reports feeling a sense of guilt around the struggles that her son has with learning, and her daughter has with mental health/disordered eating.      Treatment Objective(s) Addressed in This Session:   Patient will use cognitive strategies identified in therapy to challenge anxious thoughts.      Intervention:   CBT: Reinforced effective use of problem-solving to assist with stressors and increase effective supports. Reflected and challenged congitive distortions that impact anxiety and depression. Coached on \"warrented vs unwarrented guilt.\" Modeled checking the facts, prompted client to identify ways in which she may practice this is the thoughts continue.         ASSESSMENT: Current Emotional / Mental Status (status of significant symptoms):   Risk " status (Self / Other harm or suicidal ideation)   Patient denies current fears or concerns for personal safety.   Patient denies current or recent suicidal ideation or behaviors.   Patientdenies current or recent homicidal ideation or behaviors.   Patient denies current or recent self injurious behavior or ideation.   Patient denies other safety concerns.   Patient Patient reports there has been no change in risk factors since their last session.     PatientPatient reports there has been no change in protective factors since their last session.     Recommended that patient call 911 or go to the local ED should there be a change in any of these risk factors.     Appearance:   Appropriate    Eye Contact:   Good    Psychomotor Behavior: Normal    Attitude:   Cooperative    Orientation:   All   Speech    Rate / Production: Normal     Volume:  Normal    Mood:    Anxious  Depressed    Affect:    Appropriate    Thought Content:  Clear    Thought Form:  Coherent  Logical    Insight:    Good      Medication Review:   No changes to current psychiatric medication(s)     Medication Compliance:   Yes     Changes in Health Issues:   None reported     Chemical Use Review:   Substance Use: Chemical use reviewed, no active concerns identified      Tobacco Use: No current tobacco use.      Diagnosis:  1. Generalized anxiety disorder    2. Panic disorder without agoraphobia         Collateral Reports Completed:   Not Applicable    PLAN: (Patient Tasks / Therapist Tasks / Other)  HOMEWORK: Practice checking the facts to challenge anxiety thoughts and self-blame when guilt is unwarrented.    Eunice Mixon MA, [facility]                                                          ______________________________________________________________________    Treatment Plan    Patient's Name: Tracee Cooper  YOB: 1970    Date: 12/16/19    Diagnoses:  300.02 (F41.1) Generalized Anxiety Disorder   (f41.0) Panic disorder without  agoraphobia  Rule out Posttraumatic Stress Disorder  Psychosocial & Contextual Factors: Past trauma, children's mental health concerns, work stressors  WHODAS: Completed    Referral / Collaboration:  Referral to another professional/service is not indicated at this time.    Anticipated number of session or this episode of care: 20      MeasurableTreatment Goal(s) related to diagnosis / functional impairment(s)  Goal 1: Patient will decrease anxiety levels as evidence by ORVILLE-7 scores, and client report.    I will know I've met my goal when I have less panic attacks, when I can drive to a new place without feeling anxious or panicked.      Objective #A (Patient Action)    Patient will identify 3 initial signs or symptoms of anxiety.  Status: Continued - Date(s): 12/16/19    Intervention(s)  Therapist will assign homework    teach emotional recognition/identification.      Objective #B  Patient will use cognitive strategies identified in therapy to challenge anxious thoughts.  Status: Continued - Date(s): 12/16/19    Intervention(s)  Therapist will assign homework    teach CBT techniques to identify and challenge cognitive distortions.    Objective #C  Patient will identify three distraction and diversion activities and use those activities to decrease level of anxiety  .  Status: Continued - Date(s): 12/16/19    Intervention(s)  Therapist will assign homework    teach DBT techniques including Mindfulness and Distress Tolerance.        Patient has reviewed and agreed to the above plan.      Eunice Mixon MA, [facility]  9/23/20

## 2020-09-30 ENCOUNTER — VIRTUAL VISIT (OUTPATIENT)
Dept: PSYCHOLOGY | Facility: CLINIC | Age: 50
End: 2020-09-30
Payer: COMMERCIAL

## 2020-09-30 DIAGNOSIS — I10 ESSENTIAL HYPERTENSION WITH GOAL BLOOD PRESSURE LESS THAN 140/90: ICD-10-CM

## 2020-09-30 DIAGNOSIS — F41.0 PANIC DISORDER WITHOUT AGORAPHOBIA: ICD-10-CM

## 2020-09-30 DIAGNOSIS — F41.1 GENERALIZED ANXIETY DISORDER: Primary | ICD-10-CM

## 2020-09-30 PROCEDURE — 90834 PSYTX W PT 45 MINUTES: CPT | Performed by: COUNSELOR

## 2020-09-30 NOTE — PROGRESS NOTES
Progress Note    Patient Name: Tracee Cooper  Date: 9/30/20         Service Type: Individual  Video Visit: No     Session Start Time: 10am  Session End Time: 10:45 am     Session Length: 45    Session #: 7    Attendees: Client attended alone     Treatment Plan Last Reviewed: 12/16/19 (Review by 3/16/20)  CGI: 12/16/19  PHQ-9 / ORVILLE-7 : 9/29/20        DATA  Interactive Complexity: No  Crisis: No       Progress Since Last Session (Related to Symptoms / Goals / Homework):   Symptoms: No change - client reports things have been feeling relatively stable    Homework: Achieved / completed to satisfaction      Episode of Care Goals: Satisfactory progress - ACTION (Actively working towards change); Intervened by reinforcing change plan / affirming steps taken     Current / Ongoing Stressors and Concerns:   Ongoing: Anxiety and panic attacks while driving, history of family conflict   Current: Client discussed challenge with negative self-talk, and how this impacts relationships.     Treatment Objective(s) Addressed in This Session:   Patient will use cognitive strategies identified in therapy to challenge anxious thoughts.      Intervention:   CBT: Reflected and challenged congitive distortions that impact anxiety and depression. Modeled emotion identification that impact thinking, and reframed thoughts to more effectively acknowledge her feelings.         ASSESSMENT: Current Emotional / Mental Status (status of significant symptoms):   Risk status (Self / Other harm or suicidal ideation)   Patient denies current fears or concerns for personal safety.   Patient denies current or recent suicidal ideation or behaviors.   Patientdenies current or recent homicidal ideation or behaviors.   Patient denies current or recent self injurious behavior or ideation.   Patient denies other safety concerns.   Patient Patient reports there has been no change in risk factors since their last  session.     PatientPatient reports there has been no change in protective factors since their last session.     Recommended that patient call 911 or go to the local ED should there be a change in any of these risk factors.     Appearance:   Appropriate    Eye Contact:   Good    Psychomotor Behavior: Normal    Attitude:   Cooperative    Orientation:   All   Speech    Rate / Production: Normal     Volume:  Normal    Mood:    Anxious    Affect:    Appropriate    Thought Content:  Clear    Thought Form:  Coherent  Logical    Insight:    Good      Medication Review:   No changes to current psychiatric medication(s)     Medication Compliance:   Yes     Changes in Health Issues:   None reported     Chemical Use Review:   Substance Use: Chemical use reviewed, no active concerns identified      Tobacco Use: No current tobacco use.      Diagnosis:  1. Generalized anxiety disorder    2. Panic disorder without agoraphobia         Collateral Reports Completed:   Not Applicable    PLAN: (Patient Tasks / Therapist Tasks / Other)  HOMEWORK: Continue practice on checking the facts to challenge anxiety thoughts and self-blame when guilt is unwarrented. Practicing identifying emotion underneath thoughts to more effectively communicate feeling with others.    Eunice Mixon MA, [facility]                                                          ______________________________________________________________________    Treatment Plan    Patient's Name: Tracee Cooper  YOB: 1970    Date: 12/16/19    Diagnoses:  300.02 (F41.1) Generalized Anxiety Disorder   (f41.0) Panic disorder without agoraphobia  Rule out Posttraumatic Stress Disorder  Psychosocial & Contextual Factors: Past trauma, children's mental health concerns, work stressors  WHODAS: Completed    Referral / Collaboration:  Referral to another professional/service is not indicated at this time.    Anticipated number of session or this episode of care:  20      MeasurableTreatment Goal(s) related to diagnosis / functional impairment(s)  Goal 1: Patient will decrease anxiety levels as evidence by ORVILLE-7 scores, and client report.    I will know I've met my goal when I have less panic attacks, when I can drive to a new place without feeling anxious or panicked.      Objective #A (Patient Action)    Patient will identify 3 initial signs or symptoms of anxiety.  Status: Continued - Date(s): 12/16/19    Intervention(s)  Therapist will assign homework    teach emotional recognition/identification.      Objective #B  Patient will use cognitive strategies identified in therapy to challenge anxious thoughts.  Status: Continued - Date(s): 12/16/19    Intervention(s)  Therapist will assign homework    teach CBT techniques to identify and challenge cognitive distortions.    Objective #C  Patient will identify three distraction and diversion activities and use those activities to decrease level of anxiety  .  Status: Continued - Date(s): 12/16/19    Intervention(s)  Therapist will assign homework    teach DBT techniques including Mindfulness and Distress Tolerance.        Patient has reviewed and agreed to the above plan.      Eunice Mixon MA, [facility]  9/30/20

## 2020-10-01 RX ORDER — LOSARTAN POTASSIUM 25 MG/1
TABLET ORAL
Qty: 30 TABLET | Refills: 0 | Status: SHIPPED | OUTPATIENT
Start: 2020-10-01 | End: 2020-11-02

## 2020-10-01 NOTE — TELEPHONE ENCOUNTER
Called spoke with  Patient   Advised need for OV appt made for Oct 2020  Review current BP meds she is taking  States NO longer taking Metoprolol  Stopped in early Sept   It currently taking Amlodipine 10mg  And losartan 25mg   Feels it is working well for her , no swelling and BPbetter   To keep appt   Medication is being filled for 1 time refill only due to:   Over due for office visit and/or labs   RAH Jordan RN/[facility] Celeste

## 2020-10-29 DIAGNOSIS — I10 ESSENTIAL HYPERTENSION WITH GOAL BLOOD PRESSURE LESS THAN 140/90: ICD-10-CM

## 2020-11-02 ENCOUNTER — VIRTUAL VISIT (OUTPATIENT)
Dept: PSYCHOLOGY | Facility: CLINIC | Age: 50
End: 2020-11-02
Payer: COMMERCIAL

## 2020-11-02 DIAGNOSIS — F41.1 GENERALIZED ANXIETY DISORDER: Primary | ICD-10-CM

## 2020-11-02 PROCEDURE — 90834 PSYTX W PT 45 MINUTES: CPT | Mod: 95 | Performed by: COUNSELOR

## 2020-11-02 RX ORDER — LOSARTAN POTASSIUM 25 MG/1
TABLET ORAL
Qty: 30 TABLET | Refills: 0 | Status: SHIPPED | OUTPATIENT
Start: 2020-11-02 | End: 2020-12-02

## 2020-11-02 NOTE — TELEPHONE ENCOUNTER
Routing refill request to provider for review/approval because:  Janell given x1 and patient did not follow up, please advise  Patient needs to be seen because it has been more than 1 year since last office visit.  Krystal Mcgrath RN

## 2020-11-02 NOTE — PATIENT INSTRUCTIONS
"HOMEWORK:    -Reach out to  to assist with problem solving distanced learning concerns.  -Write down \"To Do's\" and prioritize: \"Have To's, Would Like To's, and what can be pushed to another day\"  -Make sure your expectations for the day are appropriate to how much stress you have on your plate.  -Practice identifying \"today problem versus tomorrow problem\". Refocus to 'Today Problems' versus worrying about the future.  "

## 2020-11-02 NOTE — PROGRESS NOTES
Progress Note    Patient Name: Tracee Cooper  Date: 11/2/20         Service Type: Individual  Video Visit: No     Session Start Time: 11am  Session End Time: 11:45 am     Session Length: 45    Session #: 8    Attendees: Client attended alone     Treatment Plan Last Reviewed: 11/2/20  PHQ-9 / ORVILLE-7 : 9/29/20        DATA  Interactive Complexity: No  Crisis: No       Progress Since Last Session (Related to Symptoms / Goals / Homework):   Symptoms: Worsening - notes increased anxiety    Homework: Achieved / completed to satisfaction      Episode of Care Goals: Satisfactory progress - ACTION (Actively working towards change); Intervened by reinforcing change plan / affirming steps taken     Current / Ongoing Stressors and Concerns:   Ongoing: Anxiety and panic attacks while driving, history of family conflict   Current:  Client reports increased anxiety around distanced learning for her son and barriers to attending medical appointment. Client reports trying to use encouragement and self-validation to assist. Also reports worry about her aunt's medical procedure this week.      Treatment Objective(s) Addressed in This Session:   Patient will use cognitive strategies identified in therapy to challenge anxious thoughts.      Intervention:   CBT: Reflected catastrophic thinking and perseveration in session. Coached on coping techniques to manage anxiety and prioritize tasks. Identified mindfulness practice to assist with catching and redirecting worry thoughts.         ASSESSMENT: Current Emotional / Mental Status (status of significant symptoms):   Risk status (Self / Other harm or suicidal ideation)   Patient denies current fears or concerns for personal safety.   Patient denies current or recent suicidal ideation or behaviors.   Patientdenies current or recent homicidal ideation or behaviors.   Patient denies current or recent self injurious behavior or ideation.   Patient  "denies other safety concerns.   Patient Patient reports there has been no change in risk factors since their last session.     PatientPatient reports there has been no change in protective factors since their last session.     Recommended that patient call 911 or go to the local ED should there be a change in any of these risk factors.     Appearance:   Appropriate    Eye Contact:   Good    Psychomotor Behavior: Normal    Attitude:   Cooperative    Orientation:   All   Speech    Rate / Production: Normal     Volume:  Normal    Mood:    Anxious    Affect:    Appropriate    Thought Content:  Clear    Thought Form:  Coherent  Logical    Insight:    Good      Medication Review:   No changes to current psychiatric medication(s)     Medication Compliance:   Yes     Changes in Health Issues:   None reported     Chemical Use Review:   Substance Use: Chemical use reviewed, no active concerns identified      Tobacco Use: No current tobacco use.      Diagnosis:  1. Generalized anxiety disorder         Collateral Reports Completed:   Not Applicable    PLAN: (Patient Tasks / Therapist Tasks / Other)  HOMEWORK: Go for walks. Practice identifying \"today versus tomorrow problems\" to limit perseveration. Write down tasks and prioritize realistically based on daily stress levels in order to determine appropriate expectations. Reach out to son's  to assist with concerns around distanced learning.       Eunice Mixon MA, [facility]                                                          ______________________________________________________________________    Treatment Plan    Patient's Name: Tracee Cooper  YOB: 1970    Date: 11/2/20    Diagnoses:  300.02 (F41.1) Generalized Anxiety Disorder   (f41.0) Panic disorder without agoraphobia  Rule out Posttraumatic Stress Disorder  Psychosocial & Contextual Factors: Past trauma, children's mental health concerns, work stressors  WHODAS: Completed    Referral / " Collaboration:  Referral to another professional/service is not indicated at this time.    Anticipated number of session or this episode of care: 20      MeasurableTreatment Goal(s) related to diagnosis / functional impairment(s)  Goal 1: Patient will decrease anxiety levels as evidence by ORVILLE-7 scores, and client report.    I will know I've met my goal when I have less panic attacks, when I can drive to a new place without feeling anxious or panicked.      Objective #A (Patient Action)    Patient will identify 3 initial signs or symptoms of anxiety.  Status: Continued - Date(s): 12/16/19, 11/2/20    Intervention(s)  Therapist will assign homework    teach emotional recognition/identification.      Objective #B  Patient will use cognitive strategies identified in therapy to challenge anxious thoughts.  Status: Continued - Date(s): 12/16/19, 11/2/20    Intervention(s)  Therapist will assign homework    teach CBT techniques to identify and challenge cognitive distortions.    Objective #C  Patient will identify three distraction and diversion activities and use those activities to decrease level of anxiety  .  Status: Continued - Date(s): 12/16/19, 11/2/20    Intervention(s)  Therapist will assign homework    teach DBT techniques including Mindfulness and Distress Tolerance.        Patient has reviewed and agreed to the above plan.      Eunice Mixon MA, [facility]  11/2/20

## 2020-11-14 ENCOUNTER — HEALTH MAINTENANCE LETTER (OUTPATIENT)
Age: 50
End: 2020-11-14

## 2020-11-16 ENCOUNTER — VIRTUAL VISIT (OUTPATIENT)
Dept: PSYCHOLOGY | Facility: CLINIC | Age: 50
End: 2020-11-16
Payer: COMMERCIAL

## 2020-11-16 DIAGNOSIS — F41.1 GENERALIZED ANXIETY DISORDER: Primary | ICD-10-CM

## 2020-11-16 PROCEDURE — 90834 PSYTX W PT 45 MINUTES: CPT | Mod: 95 | Performed by: COUNSELOR

## 2020-11-16 NOTE — PROGRESS NOTES
Progress Note    Patient Name: Tracee Cooper  Date: 11/16/20         Service Type: Individual  Video Visit: No     Session Start Time: 10am  Session End Time: 10:45 am     Session Length: 45    Session #: 9    Attendees: Client attended alone     Treatment Plan Last Reviewed: 11/2/20  PHQ-9 / ORVILLE-7 : 9/29/20        DATA  Interactive Complexity: No  Crisis: No       Progress Since Last Session (Related to Symptoms / Goals / Homework):   Symptoms: No change      Homework: Achieved / completed to satisfaction      Episode of Care Goals: Satisfactory progress - ACTION (Actively working towards change); Intervened by reinforcing change plan / affirming steps taken     Current / Ongoing Stressors and Concerns:   Ongoing: Anxiety and panic attacks while driving, history of family conflict   Current:  Client reports feeling as if anxiety has slightly improved, if not shifted. Notes previous stressor of son's schooling has been resolved. Noticed anxiety around driving has been more prominent.      Treatment Objective(s) Addressed in This Session:   Patient will use cognitive strategies identified in therapy to challenge anxious thoughts.      Intervention:   CBT: Reviewed homework and reinforced effective practice of techniques. Prompted ideas for skill generalization. Offered information about EMDR therapy that may assist with addressing panic and past trauma.         ASSESSMENT: Current Emotional / Mental Status (status of significant symptoms):   Risk status (Self / Other harm or suicidal ideation)   Patient denies current fears or concerns for personal safety.   Patient denies current or recent suicidal ideation or behaviors.   Patientdenies current or recent homicidal ideation or behaviors.   Patient denies current or recent self injurious behavior or ideation.   Patient denies other safety concerns.   Patient Patient reports there has been no change in risk factors since  "their last session.     PatientPatient reports there has been no change in protective factors since their last session.     Recommended that patient call 911 or go to the local ED should there be a change in any of these risk factors.     Appearance:   Appropriate    Eye Contact:   Good    Psychomotor Behavior: Normal    Attitude:   Cooperative    Orientation:   All   Speech    Rate / Production: Normal/ Responsive    Volume:  Normal    Mood:    Anxious    Affect:    Appropriate    Thought Content:  Clear    Thought Form:  Coherent  Logical    Insight:    Good      Medication Review:   No changes to current psychiatric medication(s)     Medication Compliance:   Yes     Changes in Health Issues:   None reported     Chemical Use Review:   Substance Use: Chemical use reviewed, no active concerns identified      Tobacco Use: No current tobacco use.      Diagnosis:  1. Generalized anxiety disorder         Collateral Reports Completed:   Not Applicable    PLAN: (Patient Tasks / Therapist Tasks / Other)  HOMEWORK: Practice identifying \"today versus tomorrow problems\" to limit perseveration. Write down tasks and prioritize realistically based on daily stress levels in order to determine appropriate expectations. Look into signing up for gym to increase movement     EMDR information to be discussed in next session.       Eunice Mixon MA, [facility]                                                          ______________________________________________________________________    Treatment Plan    Patient's Name: Tracee Cooper  YOB: 1970    Date: 11/2/20    Diagnoses:  300.02 (F41.1) Generalized Anxiety Disorder   (f41.0) Panic disorder without agoraphobia  Rule out Posttraumatic Stress Disorder  Psychosocial & Contextual Factors: Past trauma, children's mental health concerns, work stressors  WHODAS: Completed    Referral / Collaboration:  Referral to another professional/service is not indicated at this " time.    Anticipated number of session or this episode of care: 20      MeasurableTreatment Goal(s) related to diagnosis / functional impairment(s)  Goal 1: Patient will decrease anxiety levels as evidence by ORVILLE-7 scores, and client report.    I will know I've met my goal when I have less panic attacks, when I can drive to a new place without feeling anxious or panicked.      Objective #A (Patient Action)    Patient will identify 3 initial signs or symptoms of anxiety.  Status: Continued - Date(s): 12/16/19, 11/2/20    Intervention(s)  Therapist will assign homework    teach emotional recognition/identification.      Objective #B  Patient will use cognitive strategies identified in therapy to challenge anxious thoughts.  Status: Continued - Date(s): 12/16/19, 11/2/20    Intervention(s)  Therapist will assign homework    teach CBT techniques to identify and challenge cognitive distortions.    Objective #C  Patient will identify three distraction and diversion activities and use those activities to decrease level of anxiety  .  Status: Continued - Date(s): 12/16/19, 11/2/20    Intervention(s)  Therapist will assign homework    teach DBT techniques including Mindfulness and Distress Tolerance.        Patient has reviewed and agreed to the above plan.      Eunice Mixon MA, [facility]  11/16/20

## 2020-11-23 DIAGNOSIS — I10 ESSENTIAL HYPERTENSION WITH GOAL BLOOD PRESSURE LESS THAN 140/90: ICD-10-CM

## 2020-11-23 NOTE — TELEPHONE ENCOUNTER
"Last Written Prescription Date:  8/10/20  Last Fill Quantity: 90,  # refills: 0   Last office visit: Visit date not found with prescribing provider:  1/16/20 tc   Future Office Visit:        Requested Prescriptions   Pending Prescriptions Disp Refills     amLODIPine (NORVASC) 10 MG tablet 90 tablet 0     Sig: Take 1 tablet (10 mg) by mouth daily       Calcium Channel Blockers Protocol  Failed - 11/23/2020  2:12 PM        Failed - Blood pressure under 140/90 in past 12 months     BP Readings from Last 3 Encounters:   06/18/20 (!) 164/95   02/06/20 (!) 160/84   01/16/20 (!) 140/82           Failed - Recent (12 mo) or future (30 days) visit within the authorizing provider's specialty     Patient has had an office visit with the authorizing provider or a provider within the authorizing providers department within the previous 12 mos or has a future within next 30 days. See \"Patient Info\" tab in inbasket, or \"Choose Columns\" in Meds & Orders section of the refill encounter.              Failed - Normal serum creatinine on file in past 12 months     Recent Labs   Lab Test 06/03/19  1446   CR 0.68       Ok to refill medication if creatinine is low          Passed - Medication is active on med list        Passed - Patient is age 18 or older        Passed - No active pregnancy on record        Passed - No positive pregnancy test in past 12 months             "

## 2020-11-28 NOTE — TELEPHONE ENCOUNTER
Routing amldipine refill request to provider for review/approval because:  BP Readings from Last 4 Encounters:   06/18/20 (!) 164/95   02/06/20 (!) 160/84   01/16/20 (!) 140/82   12/27/19 (!) 140/90     Last BMP was 6/3/19 and sodium was 132.    Rojelio Razo RN

## 2020-11-30 DIAGNOSIS — I10 ESSENTIAL HYPERTENSION WITH GOAL BLOOD PRESSURE LESS THAN 140/90: ICD-10-CM

## 2020-11-30 RX ORDER — AMLODIPINE BESYLATE 10 MG/1
10 TABLET ORAL DAILY
Qty: 30 TABLET | Refills: 0 | Status: SHIPPED | OUTPATIENT
Start: 2020-11-30 | End: 2020-12-02

## 2020-11-30 NOTE — TELEPHONE ENCOUNTER
Please call pt.  She needs to see me in clinic and bring her home blood pressure cuff to the visit.  Filled for 30 days to allow her to schedule appointment.    Louise Agrawal, DO

## 2020-12-02 DIAGNOSIS — I10 ESSENTIAL HYPERTENSION WITH GOAL BLOOD PRESSURE LESS THAN 140/90: ICD-10-CM

## 2020-12-02 RX ORDER — AMLODIPINE BESYLATE 10 MG/1
10 TABLET ORAL DAILY
Qty: 30 TABLET | Refills: 0 | Status: SHIPPED | OUTPATIENT
Start: 2020-12-02 | End: 2020-12-15

## 2020-12-02 NOTE — TELEPHONE ENCOUNTER
Routing refill request to provider for review/approval because:  BP Readings from Last 4 Encounters:   06/18/20 (!) 164/95   02/06/20 (!) 160/84   01/16/20 (!) 140/82   12/27/19 (!) 140/90     Last BMP lab was 6/3/19 and sodium was 132    Thank you.   Rojelio Razo, RN

## 2020-12-02 NOTE — TELEPHONE ENCOUNTER
"Last Written Prescription Date:  11/02/20  Last Fill Quantity: 30,  # refills: 0   Last office visit: Visit date not found with prescribing provider:  Nghia   Future Office Visit:      Requested Prescriptions   Pending Prescriptions Disp Refills     losartan (COZAAR) 25 MG tablet 30 tablet 0     Sig: Take 1 tablet (25 mg) by mouth daily       Angiotensin-II Receptors Failed - 12/2/2020  2:52 PM        Failed - Last blood pressure under 140/90 in past 12 months     BP Readings from Last 3 Encounters:   06/18/20 (!) 164/95   02/06/20 (!) 160/84   01/16/20 (!) 140/82                 Failed - Normal serum creatinine on file in past 12 months     Recent Labs   Lab Test 06/03/19  1446   CR 0.68       Ok to refill medication if creatinine is low          Failed - Normal serum potassium on file in past 12 months     Recent Labs   Lab Test 06/03/19  1446   POTASSIUM 3.4            Passed - Recent (12 mo) or future (30 days) visit within the authorizing provider's specialty     Patient has had an office visit with the authorizing provider or a provider within the authorizing providers department within the previous 12 mos or has a future within next 30 days. See \"Patient Info\" tab in inbasket, or \"Choose Columns\" in Meds & Orders section of the refill encounter.          Passed - Medication is active on med list        Passed - Patient is age 18 or older        Passed - No active pregnancy on record        Passed - No positive pregnancy test in past 12 months           \      "

## 2020-12-03 ENCOUNTER — MYC REFILL (OUTPATIENT)
Dept: FAMILY MEDICINE | Facility: CLINIC | Age: 50
End: 2020-12-03

## 2020-12-03 DIAGNOSIS — F41.1 GENERALIZED ANXIETY DISORDER: ICD-10-CM

## 2020-12-03 DIAGNOSIS — I10 ESSENTIAL HYPERTENSION WITH GOAL BLOOD PRESSURE LESS THAN 140/90: ICD-10-CM

## 2020-12-03 RX ORDER — LOSARTAN POTASSIUM 25 MG/1
25 TABLET ORAL DAILY
Qty: 30 TABLET | Refills: 0 | Status: SHIPPED | OUTPATIENT
Start: 2020-12-03 | End: 2020-12-15

## 2020-12-07 RX ORDER — LOSARTAN POTASSIUM 25 MG/1
25 TABLET ORAL DAILY
Qty: 30 TABLET | Refills: 0 | Status: CANCELLED | OUTPATIENT
Start: 2020-12-07

## 2020-12-07 RX ORDER — ALPRAZOLAM 1 MG
TABLET ORAL
Qty: 20 TABLET | Refills: 0 | Status: SHIPPED | OUTPATIENT
Start: 2020-12-07 | End: 2021-02-11

## 2020-12-07 NOTE — TELEPHONE ENCOUNTER
Routing MyChart alprazolam refill request to provider for review/approval because:  Drug not on the [facility] refill protocol   Rojelio Razo RN

## 2020-12-10 ENCOUNTER — VIRTUAL VISIT (OUTPATIENT)
Dept: PSYCHOLOGY | Facility: CLINIC | Age: 50
End: 2020-12-10
Payer: COMMERCIAL

## 2020-12-10 DIAGNOSIS — F41.1 GENERALIZED ANXIETY DISORDER: Primary | ICD-10-CM

## 2020-12-10 PROCEDURE — 90834 PSYTX W PT 45 MINUTES: CPT | Mod: 95 | Performed by: COUNSELOR

## 2020-12-10 NOTE — PROGRESS NOTES
Progress Note    Patient Name: Tracee Cooper  Date: 12/10/20         Service Type: Individual  Video Visit: No     Session Start Time: 11:30am  Session End Time: 12:15 pm     Session Length: 45    Session #: 10    Attendees: Client attended alone     Treatment Plan Last Reviewed: 11/2/20  PHQ-9 / ORVILLE-7 : 9/29/20        DATA  Interactive Complexity: No  Crisis: No       Progress Since Last Session (Related to Symptoms / Goals / Homework):   Symptoms: No change      Homework: Achieved / completed to satisfaction      Episode of Care Goals: Satisfactory progress - ACTION (Actively working towards change); Intervened by reinforcing change plan / affirming steps taken     Current / Ongoing Stressors and Concerns:   Ongoing: Anxiety and panic attacks while driving, history of family conflict   Current:  Client reports anxiety has remained relatively stable. Notes frustration around ongoing fear of experiencing a panic attack while driving. Reports she is still completing fearful tasks regardless of urges to avoid. Expresses interest in engaging in EMDR to address panic.      Treatment Objective(s) Addressed in This Session:   Patient will use cognitive strategies identified in therapy to challenge anxious thoughts.      Intervention:   CBT: Reviewed homework and reinforced effective practice of techniques. Prompted ideas for skill generalization. Offered information about EMDR therapy that may assist with addressing panic and past trauma. Discussed pattern of client not trusting her ability to manage anxiety although is able to identify appropriate skills to use in situations when prompted by writer. Discussed ways she may remind herself that she has the knowledge and ability to manage anxiety.         ASSESSMENT: Current Emotional / Mental Status (status of significant symptoms):   Risk status (Self / Other harm or suicidal ideation)   Patient denies current fears or  concerns for personal safety.   Patient denies current or recent suicidal ideation or behaviors.   Patientdenies current or recent homicidal ideation or behaviors.   Patient denies current or recent self injurious behavior or ideation.   Patient denies other safety concerns.   Patient Patient reports there has been no change in risk factors since their last session.     PatientPatient reports there has been no change in protective factors since their last session.     Recommended that patient call 911 or go to the local ED should there be a change in any of these risk factors.     Appearance:   Appropriate    Eye Contact:   Good    Psychomotor Behavior: Normal    Attitude:   Cooperative    Orientation:   All   Speech    Rate / Production: Normal/ Responsive    Volume:  Normal    Mood:    Anxious    Affect:    Appropriate    Thought Content:  Clear    Thought Form:  Coherent  Logical    Insight:    Good      Medication Review:   No changes to current psychiatric medication(s)     Medication Compliance:   Yes     Changes in Health Issues:   None reported     Chemical Use Review:   Substance Use: Chemical use reviewed, no active concerns identified      Tobacco Use: No current tobacco use.      Diagnosis:  1. Generalized anxiety disorder         Collateral Reports Completed:   Not Applicable    PLAN: (Patient Tasks / Therapist Tasks / Other)  HOMEWORK: Write a letter to future self describing her ability to manage anxiety effectively, things she wishes to remember, and coping skills she knows to try.     Trauma therapy referrals to be discussed in next session.        Eunice Mixon MA, [facility]                                                          ______________________________________________________________________    Treatment Plan    Patient's Name: Tracee Cooper  YOB: 1970    Date: 11/2/20    Diagnoses:  300.02 (F41.1) Generalized Anxiety Disorder   (f41.0) Panic disorder without  agoraphobia  Rule out Posttraumatic Stress Disorder  Psychosocial & Contextual Factors: Past trauma, children's mental health concerns, work stressors  WHODAS: Completed    Referral / Collaboration:  Referral to another professional/service is not indicated at this time.    Anticipated number of session or this episode of care: 20      MeasurableTreatment Goal(s) related to diagnosis / functional impairment(s)  Goal 1: Patient will decrease anxiety levels as evidence by ORVILLE-7 scores, and client report.    I will know I've met my goal when I have less panic attacks, when I can drive to a new place without feeling anxious or panicked.      Objective #A (Patient Action)    Patient will identify 3 initial signs or symptoms of anxiety.  Status: Continued - Date(s): 12/16/19, 11/2/20    Intervention(s)  Therapist will assign homework    teach emotional recognition/identification.      Objective #B  Patient will use cognitive strategies identified in therapy to challenge anxious thoughts.  Status: Continued - Date(s): 12/16/19, 11/2/20    Intervention(s)  Therapist will assign homework    teach CBT techniques to identify and challenge cognitive distortions.    Objective #C  Patient will identify three distraction and diversion activities and use those activities to decrease level of anxiety  .  Status: Continued - Date(s): 12/16/19, 11/2/20    Intervention(s)  Therapist will assign homework    teach DBT techniques including Mindfulness and Distress Tolerance.        Patient has reviewed and agreed to the above plan.      Eunice Mixon MA, [facility]  12/10/20

## 2020-12-15 ENCOUNTER — OFFICE VISIT (OUTPATIENT)
Dept: FAMILY MEDICINE | Facility: CLINIC | Age: 50
End: 2020-12-15
Payer: COMMERCIAL

## 2020-12-15 VITALS
HEART RATE: 90 BPM | HEIGHT: 65 IN | SYSTOLIC BLOOD PRESSURE: 130 MMHG | BODY MASS INDEX: 28.42 KG/M2 | TEMPERATURE: 99.4 F | WEIGHT: 170.6 LBS | DIASTOLIC BLOOD PRESSURE: 80 MMHG

## 2020-12-15 DIAGNOSIS — F41.1 GENERALIZED ANXIETY DISORDER: ICD-10-CM

## 2020-12-15 DIAGNOSIS — N95.1 VASOMOTOR SYMPTOMS DUE TO MENOPAUSE: ICD-10-CM

## 2020-12-15 DIAGNOSIS — Z12.31 ENCOUNTER FOR SCREENING MAMMOGRAM FOR BREAST CANCER: ICD-10-CM

## 2020-12-15 DIAGNOSIS — E04.1 THYROID NODULE: ICD-10-CM

## 2020-12-15 DIAGNOSIS — I10 ESSENTIAL HYPERTENSION WITH GOAL BLOOD PRESSURE LESS THAN 140/90: Primary | ICD-10-CM

## 2020-12-15 DIAGNOSIS — Z13.6 CARDIOVASCULAR SCREENING; LDL GOAL LESS THAN 160: ICD-10-CM

## 2020-12-15 LAB
ALBUMIN SERPL-MCNC: 3.9 G/DL (ref 3.4–5)
ALP SERPL-CCNC: 89 U/L (ref 40–150)
ALT SERPL W P-5'-P-CCNC: 37 U/L (ref 0–50)
ANION GAP SERPL CALCULATED.3IONS-SCNC: 5 MMOL/L (ref 3–14)
AST SERPL W P-5'-P-CCNC: 23 U/L (ref 0–45)
BILIRUB SERPL-MCNC: 0.3 MG/DL (ref 0.2–1.3)
BUN SERPL-MCNC: 16 MG/DL (ref 7–30)
CALCIUM SERPL-MCNC: 9.9 MG/DL (ref 8.5–10.1)
CHLORIDE SERPL-SCNC: 104 MMOL/L (ref 94–109)
CO2 SERPL-SCNC: 28 MMOL/L (ref 20–32)
CREAT SERPL-MCNC: 0.82 MG/DL (ref 0.52–1.04)
FSH SERPL-ACNC: 63 IU/L
GFR SERPL CREATININE-BSD FRML MDRD: 83 ML/MIN/{1.73_M2}
GLUCOSE SERPL-MCNC: 106 MG/DL (ref 70–99)
LDLC SERPL DIRECT ASSAY-MCNC: 205 MG/DL
POTASSIUM SERPL-SCNC: 3.6 MMOL/L (ref 3.4–5.3)
PROT SERPL-MCNC: 7.6 G/DL (ref 6.8–8.8)
SODIUM SERPL-SCNC: 137 MMOL/L (ref 133–144)
TSH SERPL DL<=0.005 MIU/L-ACNC: 1.84 MU/L (ref 0.4–4)

## 2020-12-15 PROCEDURE — 99214 OFFICE O/P EST MOD 30 MIN: CPT | Performed by: FAMILY MEDICINE

## 2020-12-15 PROCEDURE — 80053 COMPREHEN METABOLIC PANEL: CPT | Performed by: FAMILY MEDICINE

## 2020-12-15 PROCEDURE — 84443 ASSAY THYROID STIM HORMONE: CPT | Performed by: FAMILY MEDICINE

## 2020-12-15 PROCEDURE — 36415 COLL VENOUS BLD VENIPUNCTURE: CPT | Performed by: FAMILY MEDICINE

## 2020-12-15 PROCEDURE — 83001 ASSAY OF GONADOTROPIN (FSH): CPT | Performed by: FAMILY MEDICINE

## 2020-12-15 PROCEDURE — 83721 ASSAY OF BLOOD LIPOPROTEIN: CPT | Performed by: FAMILY MEDICINE

## 2020-12-15 RX ORDER — AMLODIPINE BESYLATE 10 MG/1
10 TABLET ORAL DAILY
Qty: 90 TABLET | Refills: 3 | Status: SHIPPED | OUTPATIENT
Start: 2020-12-15 | End: 2022-01-11

## 2020-12-15 RX ORDER — LOSARTAN POTASSIUM 25 MG/1
25 TABLET ORAL DAILY
Qty: 90 TABLET | Refills: 3 | Status: SHIPPED | OUTPATIENT
Start: 2020-12-15 | End: 2022-01-11

## 2020-12-15 RX ORDER — VENLAFAXINE HYDROCHLORIDE 150 MG/1
CAPSULE, EXTENDED RELEASE ORAL
Qty: 90 CAPSULE | Refills: 1 | Status: SHIPPED | OUTPATIENT
Start: 2020-12-15 | End: 2021-02-01

## 2020-12-15 ASSESSMENT — ANXIETY QUESTIONNAIRES
5. BEING SO RESTLESS THAT IT IS HARD TO SIT STILL: SEVERAL DAYS
GAD7 TOTAL SCORE: 8
2. NOT BEING ABLE TO STOP OR CONTROL WORRYING: MORE THAN HALF THE DAYS
6. BECOMING EASILY ANNOYED OR IRRITABLE: SEVERAL DAYS
3. WORRYING TOO MUCH ABOUT DIFFERENT THINGS: SEVERAL DAYS
7. FEELING AFRAID AS IF SOMETHING AWFUL MIGHT HAPPEN: SEVERAL DAYS
1. FEELING NERVOUS, ANXIOUS, OR ON EDGE: SEVERAL DAYS

## 2020-12-15 ASSESSMENT — MIFFLIN-ST. JEOR: SCORE: 1376.78

## 2020-12-15 ASSESSMENT — PATIENT HEALTH QUESTIONNAIRE - PHQ9
5. POOR APPETITE OR OVEREATING: SEVERAL DAYS
SUM OF ALL RESPONSES TO PHQ QUESTIONS 1-9: 1

## 2020-12-15 NOTE — PATIENT INSTRUCTIONS
I will let you know lab results when I see them    You can call [phone] to scheduled both your mammogram and thyroid ultrasound.  Try to do those on the same day    We should follow up again in a year, sooner if you need anything

## 2020-12-15 NOTE — NURSING NOTE
"Initial /80   Pulse 90   Temp 99.4  F (37.4  C) (Tympanic)   Ht 1.638 m (5' 4.5\")   Wt 77.4 kg (170 lb 9.6 oz)   Breastfeeding No   BMI 28.83 kg/m   Estimated body mass index is 28.83 kg/m  as calculated from the following:    Height as of this encounter: 1.638 m (5' 4.5\").    Weight as of this encounter: 77.4 kg (170 lb 9.6 oz). .      "

## 2020-12-15 NOTE — PROGRESS NOTES
Subjective     Tracee Cooper is a 50 year old female who presents to clinic today for the following health issues:    HPI         Hypertension Follow-up      Do you check your blood pressure regularly outside of the clinic? Yes     Are you following a low salt diet? No    Are your blood pressures ever more than 140 on the top number (systolic) OR more   than 90 on the bottom number (diastolic), for example 140/90? Not checking    Has been at goal when checked for pt.  Taking all meds regularly, no side effects.  Feels good with this regimen.      Anxiety Follow-Up    How are you doing with your anxiety since your last visit? No change/stable    Are you having other symptoms that might be associated with anxiety? No    Have you had a significant life event? No     Are you feeling depressed? No    Do you have any concerns with your use of alcohol or other drugs? No     Has been going pretty well.  Working with therapist regularly which has been very helpful.  Remains on venlafaxine        Social History     Tobacco Use     Smoking status: Former Smoker     Types: Cigarettes     Quit date: 2006     Years since quittin.8     Smokeless tobacco: Never Used   Substance Use Topics     Alcohol use: Yes     Comment: occasionally     Drug use: No     ORVILLE-7 SCORE 2019 7/10/2020 2020   Total Score - - -   Total Score - - 4 (minimal anxiety)   Total Score 5 3 4     PHQ 2019 7/10/2020 2020   PHQ-9 Total Score 0 3 2   Q9: Thoughts of better off dead/self-harm past 2 weeks Not at all Not at all Not at all           How many servings of fruits and vegetables do you eat daily?  2-3    On average, how many sweetened beverages do you drink each day (Examples: soda, juice, sweet tea, etc.  Do NOT count diet or artificially sweetened beverages)?   0    How many days per week do you exercise enough to make your heart beat faster? 4    How many minutes a day do you exercise enough to make your heart beat  "faster? 30 - 60    How many days per week do you miss taking your medication? 0    *  Having hot flashes/night sweats.  No vaginal bleeding, s/p ablation in 2006.  Mom with bad menopause.  Wondering if she is in menopause.  Would like a blood test.        Review of Systems   Constitutional, HEENT, cardiovascular, pulmonary, gi and gu systems are negative, except as otherwise noted.      Objective    /80   Pulse 90   Temp 99.4  F (37.4  C) (Tympanic)   Ht 1.638 m (5' 4.5\")   Wt 77.4 kg (170 lb 9.6 oz)   Breastfeeding No   BMI 28.83 kg/m    Body mass index is 28.83 kg/m .  Physical Exam   PE:  VS as above   Gen:  WN/WD/WH female in NAD   Neck:  supple, no LAD appreciated   Heart:  RRR without murmur, nl S1, S2, no rubs or gallops   Lungs CTA irish without rales/ronchi/wheezes   Ext:  No pedal edema  Psych: Alert and oriented times 3; coherent speech, normal   rate and volume, able to articulate logical thoughts, able   to abstract reason, no tangential thoughts, no hallucinations   or delusions  Her affect is bright and appropriate      Epic reviewed        A/p:      ICD-10-CM    1. Essential hypertension with goal blood pressure less than 140/90  I10 losartan (COZAAR) 25 MG tablet     amLODIPine (NORVASC) 10 MG tablet     Comprehensive metabolic panel (BMP + Alb, Alk Phos, ALT, AST, Total. Bili, TP)   2. Generalized anxiety disorder  F41.1 venlafaxine (EFFEXOR-XR) 150 MG 24 hr capsule   3. Vasomotor symptoms due to menopause  N95.1 Follicle stimulating hormone   4. Thyroid nodule  E04.1 US Thyroid     TSH   5. Encounter for screening mammogram for breast cancer  Z12.31 *MA Screening Digital Bilateral   6. CARDIOVASCULAR SCREENING; LDL GOAL LESS THAN 160  Z13.6 LDL cholesterol direct     HTN:  Controlled, labs due.  Continue current meds    Anxiety:  Stable/oimproved.  Continue venlafaxine and therapy    Vasomotor symptoms:  Menopause related, FSH pending.  Reviewed options for management if " needed    Thyroid nodule:  S/p benign biopsy 2019.  Overdue for annual follow up

## 2020-12-16 ASSESSMENT — ANXIETY QUESTIONNAIRES: GAD7 TOTAL SCORE: 8

## 2021-01-21 ENCOUNTER — VIRTUAL VISIT (OUTPATIENT)
Dept: PSYCHOLOGY | Facility: CLINIC | Age: 51
End: 2021-01-21
Payer: COMMERCIAL

## 2021-01-21 DIAGNOSIS — F41.0 PANIC DISORDER WITHOUT AGORAPHOBIA: Primary | ICD-10-CM

## 2021-01-21 PROCEDURE — 90834 PSYTX W PT 45 MINUTES: CPT | Mod: 95 | Performed by: COUNSELOR

## 2021-01-21 NOTE — PROGRESS NOTES
Progress Note    Patient Name: Tracee Cooper  Date: 1/21/21         Service Type: Individual  Video Visit: No     Session Start Time: 9am  Session End Time: 9:45     Session Length: 45    Session #: 11    Attendees: Client attended alone     Treatment Plan Last Reviewed: 11/2/20  PHQ-9 / ORVILLE-7 : 9/29/20        DATA  Interactive Complexity: No  Crisis: No       Progress Since Last Session (Related to Symptoms / Goals / Homework):   Symptoms: Improving - feeling that anxiety is overall well managed    Homework: Achieved / completed to satisfaction      Episode of Care Goals: Satisfactory progress - ACTION (Actively working towards change); Intervened by reinforcing change plan / affirming steps taken     Current / Ongoing Stressors and Concerns:   Ongoing: Anxiety and panic attacks while driving, history of family conflict   Current:  Client reports anxiety has remained relatively stable. Client reports trying to stay more present focused and avoiding watching the news as often to prevent unneeded stress. Discussed some challenges including disappointment around her daughter's recent engagement as she feels she is too young. Notes marriage is feeling very positive.       Treatment Objective(s) Addressed in This Session:   Patient will use cognitive strategies identified in therapy to challenge anxious thoughts.      Intervention:   CBT: Offered validation around emotional experience. Coached on identification of things within her control versus daughter's choices. Followed-up on previous EMDR referral.         ASSESSMENT: Current Emotional / Mental Status (status of significant symptoms):   Risk status (Self / Other harm or suicidal ideation)   Patient denies current fears or concerns for personal safety.   Patient denies current or recent suicidal ideation or behaviors.   Patientdenies current or recent homicidal ideation or behaviors.   Patient denies current or recent  self injurious behavior or ideation.   Patient denies other safety concerns.   Patient Patient reports there has been no change in risk factors since their last session.     PatientPatient reports there has been no change in protective factors since their last session.     Recommended that patient call 911 or go to the local ED should there be a change in any of these risk factors.     Appearance:   Appropriate    Eye Contact:   Good    Psychomotor Behavior: Normal    Attitude:   Cooperative    Orientation:   All   Speech    Rate / Production: Normal/ Responsive    Volume:  Normal    Mood:    Anxious  Normal   Affect:    Appropriate    Thought Content:  Clear    Thought Form:  Coherent  Logical    Insight:    Good      Medication Review:   No changes to current psychiatric medication(s)     Medication Compliance:   Yes     Changes in Health Issues:   None reported     Chemical Use Review:   Substance Use: Chemical use reviewed, no active concerns identified      Tobacco Use: No current tobacco use.      Diagnosis:  1. Panic disorder without agoraphobia         Collateral Reports Completed:   Not Applicable    PLAN: (Patient Tasks / Therapist Tasks / Other)  HOMEWORK: Trauma therapy referrals offered to client, and will follow-up in next session.       Eunice Mixon MA, [facility]                                                          ______________________________________________________________________    Treatment Plan    Patient's Name: Tracee Cooper  YOB: 1970    Date: 11/2/20    Diagnoses:  300.02 (F41.1) Generalized Anxiety Disorder   (f41.0) Panic disorder without agoraphobia  Rule out Posttraumatic Stress Disorder  Psychosocial & Contextual Factors: Past trauma, children's mental health concerns, work stressors  WHODAS: Completed    Referral / Collaboration:  Referral to another professional/service is not indicated at this time.    Anticipated number of session or this episode of care:  20      MeasurableTreatment Goal(s) related to diagnosis / functional impairment(s)  Goal 1: Patient will decrease anxiety levels as evidence by ORVILLE-7 scores, and client report.    I will know I've met my goal when I have less panic attacks, when I can drive to a new place without feeling anxious or panicked.      Objective #A (Patient Action)    Patient will identify 3 initial signs or symptoms of anxiety.  Status: Continued - Date(s): 12/16/19, 11/2/20    Intervention(s)  Therapist will assign homework    teach emotional recognition/identification.      Objective #B  Patient will use cognitive strategies identified in therapy to challenge anxious thoughts.  Status: Continued - Date(s): 12/16/19, 11/2/20    Intervention(s)  Therapist will assign homework    teach CBT techniques to identify and challenge cognitive distortions.    Objective #C  Patient will identify three distraction and diversion activities and use those activities to decrease level of anxiety  .  Status: Continued - Date(s): 12/16/19, 11/2/20    Intervention(s)  Therapist will assign homework    teach DBT techniques including Mindfulness and Distress Tolerance.        Patient has reviewed and agreed to the above plan.      Eunice Mixon MA, [facility]  1/21/21

## 2021-01-28 ENCOUNTER — VIRTUAL VISIT (OUTPATIENT)
Dept: PSYCHOLOGY | Facility: CLINIC | Age: 51
End: 2021-01-28
Payer: COMMERCIAL

## 2021-01-28 DIAGNOSIS — F41.1 GENERALIZED ANXIETY DISORDER: ICD-10-CM

## 2021-01-28 DIAGNOSIS — F41.0 PANIC DISORDER WITHOUT AGORAPHOBIA: Primary | ICD-10-CM

## 2021-01-28 PROCEDURE — 90834 PSYTX W PT 45 MINUTES: CPT | Mod: 95 | Performed by: COUNSELOR

## 2021-02-01 DIAGNOSIS — F41.1 GENERALIZED ANXIETY DISORDER: ICD-10-CM

## 2021-02-01 RX ORDER — VENLAFAXINE HYDROCHLORIDE 150 MG/1
CAPSULE, EXTENDED RELEASE ORAL
Qty: 90 CAPSULE | Refills: 1 | Status: SHIPPED | OUTPATIENT
Start: 2021-02-01 | End: 2021-08-02

## 2021-02-04 ENCOUNTER — VIRTUAL VISIT (OUTPATIENT)
Dept: PSYCHOLOGY | Facility: CLINIC | Age: 51
End: 2021-02-04
Payer: COMMERCIAL

## 2021-02-04 DIAGNOSIS — F41.0 PANIC DISORDER WITHOUT AGORAPHOBIA: ICD-10-CM

## 2021-02-04 DIAGNOSIS — F41.1 GENERALIZED ANXIETY DISORDER: Primary | ICD-10-CM

## 2021-02-04 PROCEDURE — 90834 PSYTX W PT 45 MINUTES: CPT | Mod: 95 | Performed by: COUNSELOR
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
## 2021-02-11 ENCOUNTER — MYC REFILL (OUTPATIENT)
Dept: FAMILY MEDICINE | Facility: CLINIC | Age: 51
End: 2021-02-11

## 2021-02-11 DIAGNOSIS — F41.1 GENERALIZED ANXIETY DISORDER: ICD-10-CM

## 2021-02-11 RX ORDER — ALPRAZOLAM 1 MG
TABLET ORAL
Qty: 20 TABLET | Refills: 0 | Status: SHIPPED | OUTPATIENT
Start: 2021-02-11 | End: 2021-04-06

## 2021-02-11 NOTE — TELEPHONE ENCOUNTER
Routing refill request to provider for review/approval because:  Drug not on the [facility] refill protocol   Krystal Mcgrath RN         
Cornelius Martinez

## 2021-02-17 ENCOUNTER — VIRTUAL VISIT (OUTPATIENT)
Dept: PSYCHOLOGY | Facility: CLINIC | Age: 51
End: 2021-02-17
Payer: COMMERCIAL

## 2021-02-17 DIAGNOSIS — F41.1 GENERALIZED ANXIETY DISORDER: Primary | ICD-10-CM

## 2021-02-17 PROCEDURE — 90834 PSYTX W PT 45 MINUTES: CPT | Mod: 95 | Performed by: COUNSELOR
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
## 2021-03-12 ENCOUNTER — VIRTUAL VISIT (OUTPATIENT)
Dept: PSYCHOLOGY | Facility: CLINIC | Age: 51
End: 2021-03-12
Payer: COMMERCIAL

## 2021-03-12 DIAGNOSIS — F41.1 GENERALIZED ANXIETY DISORDER: Primary | ICD-10-CM

## 2021-03-12 DIAGNOSIS — F41.0 PANIC DISORDER WITHOUT AGORAPHOBIA: ICD-10-CM

## 2021-03-12 PROCEDURE — 90837 PSYTX W PT 60 MINUTES: CPT | Mod: 95 | Performed by: COUNSELOR
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
## 2021-03-18 ENCOUNTER — VIRTUAL VISIT (OUTPATIENT)
Dept: PSYCHOLOGY | Facility: CLINIC | Age: 51
End: 2021-03-18
Payer: COMMERCIAL

## 2021-03-18 DIAGNOSIS — F41.0 PANIC DISORDER WITHOUT AGORAPHOBIA: ICD-10-CM

## 2021-03-18 DIAGNOSIS — F41.1 GENERALIZED ANXIETY DISORDER: Primary | ICD-10-CM

## 2021-03-18 PROCEDURE — 90834 PSYTX W PT 45 MINUTES: CPT | Mod: 95 | Performed by: COUNSELOR

## 2021-03-18 NOTE — PROGRESS NOTES
Progress Note    Patient Name: Tracee Cooper  Date: 3/18/21         Service Type: Individual     Video Visit: No     Session Start Time: 9am  Session End Time: 9:45     Session Length: 45    Session #: 16    Attendees: Client attended alone     Treatment Plan Last Reviewed: 2/4/21  PHQ-9 / ORVILLE-7 : 9/29/20        DATA  Interactive Complexity: No  Crisis: No       Progress Since Last Session (Related to Symptoms / Goals / Homework):   Symptoms: No change      Homework: Partially completed - has not looked into trauma referrals, reassigned         Episode of Care Goals: Satisfactory progress - ACTION (Actively working towards change); Intervened by reinforcing change plan / affirming steps taken       Current / Ongoing Stressors and Concerns:   Ongoing: Anxiety and panic attacks while driving, history of family conflict   Current: Client continued discussion around anxiety related to upcoming vacation with in-laws and fear that she will be triggered by her sister-in-laws. Explored ways to set boundaries with sister-in-laws, ask for support in a clear and direct way from , and identify personal things she may engage in if needing a break from the others.        Treatment Objective(s) Addressed in This Session:   Patient will use cognitive strategies identified in therapy to challenge anxious thoughts.    Patient will identify three distraction and diversion activities and use those activities to decrease level of anxiety.        Intervention:   CBT: Offered validation around emotional experience. Prompted emotion identification. Assisted in problem-solving barriers to skill use with family, and developing [place] [employer] plan.  Coached on thought reframing to challenge mind reading and self-judgment.        ASSESSMENT: Current Emotional / Mental Status (status of significant symptoms):   Risk status (Self / Other harm or suicidal ideation)   Patient denies current fears  or concerns for personal safety.   Patient denies current or recent suicidal ideation or behaviors.   Patientdenies current or recent homicidal ideation or behaviors.   Patient denies current or recent self injurious behavior or ideation.   Patient denies other safety concerns.   Patient reports there has been no change in risk factors since their last session.     Patient reports there has been no change in protective factors since their last session.     Recommended that patient call 911 or go to the local ED should there be a change in any of these risk factors.     Appearance:   Appropriate    Eye Contact:   Good    Psychomotor Behavior: Normal    Attitude:   Cooperative    Orientation:   All   Speech    Rate / Production: Normal/ Responsive    Volume:  Normal    Mood:    Anxious    Affect:    Appropriate    Thought Content:  Clear    Thought Form:  Coherent  Logical    Insight:    Good      Medication Review:   No changes to current psychiatric medication(s)     Medication Compliance:   Yes     Changes in Health Issues:   None reported     Chemical Use Review:   Substance Use: Chemical use reviewed, no active concerns identified      Tobacco Use: No current tobacco use.      Diagnosis:  1. Generalized anxiety disorder    2. Panic disorder without agoraphobia         Collateral Reports Completed:   Not Applicable    PLAN: (Patient Tasks / Therapist Tasks / Other)  HOMEWORK: Reassign- Trauma therapy referrals offered to client, and will follow-up in next session. Practice Distress Tolerance and boundary identification as discussed in today's session.        Eunice Mixon MA, [facility]                                                          ______________________________________________________________________    Treatment Plan    Patient's Name: Tracee Cooper  YOB: 1970    Date: 2/4/21    Diagnoses:  300.02 (F41.1) Generalized Anxiety Disorder   (f41.0) Panic disorder without agoraphobia  Rule out  Posttraumatic Stress Disorder  Psychosocial & Contextual Factors: Past trauma, children's mental health concerns, work stressors  WHODAS: Completed    Referral / Collaboration:  Referral to another professional/service is not indicated at this time.    Anticipated number of session or this episode of care: 20      MeasurableTreatment Goal(s) related to diagnosis / functional impairment(s)  Goal 1: Patient will decrease anxiety levels as evidence by ORVILLE-7 scores, and client report.    I will know I've met my goal when I have less panic attacks, when I can drive to a new place without feeling anxious or panicked.      Objective #A (Patient Action)    Patient will identify 3 initial signs or symptoms of anxiety.  Status: Continued - Date(s): 12/16/19, 11/2/20, 2/4/21    Intervention(s)  Therapist will assign homework    teach emotional recognition/identification.      Objective #B  Patient will use cognitive strategies identified in therapy to challenge anxious thoughts.  Status: Continued - Date(s): 12/16/19, 11/2/20, 2/4/21    Intervention(s)  Therapist will assign homework    teach CBT techniques to identify and challenge cognitive distortions.    Objective #C  Patient will identify three distraction and diversion activities and use those activities to decrease level of anxiety  .  Status: Continued - Date(s): 12/16/19, 11/2/20, 2/4/21    Intervention(s)  Therapist will assign homework    teach DBT techniques including Mindfulness and Distress Tolerance.        Patient has reviewed and agreed to the above plan.        Eunice Mixon MA, [facility]  3/18/21

## 2021-03-28 ENCOUNTER — HEALTH MAINTENANCE LETTER (OUTPATIENT)
Age: 51
End: 2021-03-28

## 2021-04-02 ENCOUNTER — VIRTUAL VISIT (OUTPATIENT)
Dept: PSYCHOLOGY | Facility: CLINIC | Age: 51
End: 2021-04-02
Payer: COMMERCIAL

## 2021-04-02 DIAGNOSIS — F41.0 PANIC DISORDER WITHOUT AGORAPHOBIA: ICD-10-CM

## 2021-04-02 DIAGNOSIS — F41.1 GENERALIZED ANXIETY DISORDER: Primary | ICD-10-CM

## 2021-04-02 PROCEDURE — 90834 PSYTX W PT 45 MINUTES: CPT | Mod: 95 | Performed by: COUNSELOR

## 2021-04-02 NOTE — PROGRESS NOTES
"                                           Progress Note    Patient Name: Tracee Cooper  Date: 4/2/21         Service Type: Individual     Video Visit: No     Session Start Time: 10am  Session End Time: 10:45     Session Length: 45    Session #: 17    Attendees: Client attended alone     Treatment Plan Last Reviewed: 2/4/21  PHQ-9 / ORVILLE-7 : 9/29/20        DATA  Interactive Complexity: No  Crisis: No       Progress Since Last Session (Related to Symptoms / Goals / Homework):   Symptoms: No change      Homework: Partially completed - has not looked into trauma referrals, reassigned.        Episode of Care Goals: Satisfactory progress - ACTION (Actively working towards change); Intervened by reinforcing change plan / affirming steps taken       Current / Ongoing Stressors and Concerns:   Ongoing: Anxiety and panic attacks while driving, history of family conflict   Current: Client discussed her recent trip, including what went well and interpersonal challenges with . Notes having a panic attack on one day without a clear trigger. Feels she did well at responding to anxiety and \"bouncing back\" sooner than she would have hisotrically.        Treatment Objective(s) Addressed in This Session:   Patient will use cognitive strategies identified in therapy to challenge anxious thoughts.    Patient will identify 3 initial signs or symptoms of anxiety.       Intervention:   CBT:     Explored possible triggers to panic, reflected vulnerabilities that may impact mental health. Coached on effective communication and conflict resolution. Reinforced effective use of skills to manage panic and engage effectively with others. Utilized reflection and summary to assist with insights into triggers and patterns around anxiety/panic.       ASSESSMENT: Current Emotional / Mental Status (status of significant symptoms):   Risk status (Self / Other harm or suicidal ideation)   Patient denies current fears or concerns for personal " "safety.   Patient denies current or recent suicidal ideation or behaviors.   Patientdenies current or recent homicidal ideation or behaviors.   Patient denies current or recent self injurious behavior or ideation.   Patient denies other safety concerns.   Patient reports there has been no change in risk factors since their last session.     Patient reports there has been no change in protective factors since their last session.     Recommended that patient call 911 or go to the local ED should there be a change in any of these risk factors.     Appearance:   Appropriate    Eye Contact:   Good    Psychomotor Behavior: Normal    Attitude:   Cooperative    Orientation:   All   Speech    Rate / Production: Normal/ Responsive    Volume:  Normal    Mood:    Anxious    Affect:    Appropriate    Thought Content:  Clear    Thought Form:  Coherent  Logical    Insight:    Good      Medication Review:   No changes to current psychiatric medication(s)     Medication Compliance:   Yes     Changes in Health Issues:   None reported     Chemical Use Review:   Substance Use: Chemical use reviewed, no active concerns identified      Tobacco Use: No current tobacco use.      Diagnosis:  1. Generalized anxiety disorder    2. Panic disorder without agoraphobia         Collateral Reports Completed:   Not Applicable    PLAN: (Patient Tasks / Therapist Tasks / Other)  HOMEWORK: Address concerns with partner using \"I language\"       Eunice Mixon MA, [facility]                                                          ______________________________________________________________________    Treatment Plan    Patient's Name: Tracee Cooper  YOB: 1970    Date: 2/4/21    Diagnoses:  300.02 (F41.1) Generalized Anxiety Disorder   (f41.0) Panic disorder without agoraphobia  Rule out Posttraumatic Stress Disorder  Psychosocial & Contextual Factors: Past trauma, children's mental health concerns, work stressors  WHODAS: " Completed    Referral / Collaboration:  Referral to another professional/service is not indicated at this time.    Anticipated number of session or this episode of care: 20      MeasurableTreatment Goal(s) related to diagnosis / functional impairment(s)  Goal 1: Patient will decrease anxiety levels as evidence by ORVILLE-7 scores, and client report.    I will know I've met my goal when I have less panic attacks, when I can drive to a new place without feeling anxious or panicked.      Objective #A (Patient Action)    Patient will identify 3 initial signs or symptoms of anxiety.  Status: Continued - Date(s): 12/16/19, 11/2/20, 2/4/21    Intervention(s)  Therapist will assign homework    teach emotional recognition/identification.      Objective #B  Patient will use cognitive strategies identified in therapy to challenge anxious thoughts.  Status: Continued - Date(s): 12/16/19, 11/2/20, 2/4/21    Intervention(s)  Therapist will assign homework    teach CBT techniques to identify and challenge cognitive distortions.    Objective #C  Patient will identify three distraction and diversion activities and use those activities to decrease level of anxiety  .  Status: Continued - Date(s): 12/16/19, 11/2/20, 2/4/21    Intervention(s)  Therapist will assign homework    teach DBT techniques including Mindfulness and Distress Tolerance.        Patient has reviewed and agreed to the above plan.        Eunice Mixon MA, [facility]  4/2/21

## 2021-04-06 ENCOUNTER — MYC REFILL (OUTPATIENT)
Dept: FAMILY MEDICINE | Facility: CLINIC | Age: 51
End: 2021-04-06

## 2021-04-06 DIAGNOSIS — F41.1 GENERALIZED ANXIETY DISORDER: ICD-10-CM

## 2021-04-06 NOTE — TELEPHONE ENCOUNTER
Routing refill request to provider for review/approval because:  Drug not on the [facility] refill protocol   Krystal Mcgrath RN

## 2021-04-07 RX ORDER — ALPRAZOLAM 1 MG
TABLET ORAL
Qty: 20 TABLET | Refills: 0 | Status: SHIPPED | OUTPATIENT
Start: 2021-04-07 | End: 2021-06-09

## 2021-04-30 ENCOUNTER — VIRTUAL VISIT (OUTPATIENT)
Dept: PSYCHOLOGY | Facility: CLINIC | Age: 51
End: 2021-04-30
Payer: COMMERCIAL

## 2021-04-30 DIAGNOSIS — F41.0 PANIC DISORDER WITHOUT AGORAPHOBIA: ICD-10-CM

## 2021-04-30 DIAGNOSIS — F41.1 GENERALIZED ANXIETY DISORDER: Primary | ICD-10-CM

## 2021-04-30 PROCEDURE — 90834 PSYTX W PT 45 MINUTES: CPT | Mod: 95 | Performed by: COUNSELOR

## 2021-04-30 NOTE — PROGRESS NOTES
"                                           Progress Note    Patient Name: Tracee Cooper  Date: 4/30/21         Service Type: Individual     Video Visit: No     Session Start Time: 11am  Session End Time: 11:45     Session Length: 45    Session #: 18    Attendees: Client attended alone     Treatment Plan Last Reviewed: 2/4/21  PHQ-9 / ORVILLE-7 : 9/29/20        DATA  Interactive Complexity: No  Crisis: No       Progress Since Last Session (Related to Symptoms / Goals / Homework):   Symptoms: No change      Homework: Partially completed - has not looked into trauma referrals, reassigned.         Episode of Care Goals: Satisfactory progress - ACTION (Actively working towards change); Intervened by reinforcing change plan / affirming steps taken       Current / Ongoing Stressors and Concerns:   Ongoing: Anxiety and panic attacks while driving, history of family conflict   Current: \"Given up on expanding my driving onto the freeway due to feeling this is overwhelming.\" Overall motivation has gotten worse - going through menopause. \"rob sucks, looking older - that's bothering me a lot\". Notes it's difficult to discuss this with  or friends, but feels it could be more helpful to de stigmatize aging process.     Treatment Objective(s) Addressed in This Session:   Patient will use cognitive strategies identified in therapy to challenge anxious thoughts.    Patient will identify 3 initial signs or symptoms of anxiety.        Intervention:   CBT:   Offered validation and normalization around emotional experience around aging. Discussed experience of body dysmorphia and eating disordered in the past, how this impacts self-worth as an adult. Utilized reflection and summary to assist with insights into triggers and patterns around anxiety/panic. Coached on identifying vulnerabilities and adjusting personal expectations accordingly.        ASSESSMENT: Current Emotional / Mental Status (status of significant " symptoms):   Risk status (Self / Other harm or suicidal ideation)   Patient denies current fears or concerns for personal safety.   Patient denies current or recent suicidal ideation or behaviors.   Patientdenies current or recent homicidal ideation or behaviors.   Patient denies current or recent self injurious behavior or ideation.   Patient denies other safety concerns.   Patient reports there has been no change in risk factors since their last session.     Patient reports there has been no change in protective factors since their last session.     Recommended that patient call 911 or go to the local ED should there be a change in any of these risk factors.     Appearance:   Appropriate    Eye Contact:   Good    Psychomotor Behavior: Normal    Attitude:   Cooperative    Orientation:   All   Speech    Rate / Production: Normal/ Responsive    Volume:  Normal    Mood:    Anxious    Affect:    Appropriate    Thought Content:  Clear    Thought Form:  Coherent  Logical    Insight:    Good      Medication Review:   No changes to current psychiatric medication(s)     Medication Compliance:   Yes     Changes in Health Issues:   None reported     Chemical Use Review:   Substance Use: Chemical use reviewed, no active concerns identified      Tobacco Use: No current tobacco use.      Diagnosis:  1. Generalized anxiety disorder    2. Panic disorder without agoraphobia         Collateral Reports Completed:   Not Applicable    PLAN: (Patient Tasks / Therapist Tasks / Other)  HOMEWORK: Discussed with  around challenges with aging to increase communication and support.       Eunice Mixon MA, [facility]                                                          ______________________________________________________________________    Treatment Plan    Patient's Name: Tracee Cooper  YOB: 1970    Date: 2/4/21    Diagnoses:  300.02 (F41.1) Generalized Anxiety Disorder   (f41.0) Panic disorder without  agoraphobia  Rule out Posttraumatic Stress Disorder  Psychosocial & Contextual Factors: Past trauma, children's mental health concerns, work stressors  WHODAS: Completed    Referral / Collaboration:  Referral to another professional/service is not indicated at this time.    Anticipated number of session or this episode of care: 20      MeasurableTreatment Goal(s) related to diagnosis / functional impairment(s)  Goal 1: Patient will decrease anxiety levels as evidence by OVRILLE-7 scores, and client report.    I will know I've met my goal when I have less panic attacks, when I can drive to a new place without feeling anxious or panicked.      Objective #A (Patient Action)    Patient will identify 3 initial signs or symptoms of anxiety.  Status: Continued - Date(s): 12/16/19, 11/2/20, 2/4/21    Intervention(s)  Therapist will assign homework    teach emotional recognition/identification.      Objective #B  Patient will use cognitive strategies identified in therapy to challenge anxious thoughts.  Status: Continued - Date(s): 12/16/19, 11/2/20, 2/4/21    Intervention(s)  Therapist will assign homework    teach CBT techniques to identify and challenge cognitive distortions.    Objective #C  Patient will identify three distraction and diversion activities and use those activities to decrease level of anxiety  .  Status: Continued - Date(s): 12/16/19, 11/2/20, 2/4/21    Intervention(s)  Therapist will assign homework    teach DBT techniques including Mindfulness and Distress Tolerance.        Patient has reviewed and agreed to the above plan.        Eunice Mixon MA, [facility]  4/30/21

## 2021-05-07 ENCOUNTER — VIRTUAL VISIT (OUTPATIENT)
Dept: PSYCHOLOGY | Facility: CLINIC | Age: 51
End: 2021-05-07
Payer: COMMERCIAL

## 2021-05-07 DIAGNOSIS — F41.1 GENERALIZED ANXIETY DISORDER: Primary | ICD-10-CM

## 2021-05-07 DIAGNOSIS — F41.0 PANIC DISORDER WITHOUT AGORAPHOBIA: ICD-10-CM

## 2021-05-07 PROCEDURE — 90834 PSYTX W PT 45 MINUTES: CPT | Mod: 95 | Performed by: COUNSELOR

## 2021-05-07 NOTE — PROGRESS NOTES
Progress Note    Patient Name: Tracee Cooper  Date: 5/7/21         Service Type: Individual     Video Visit: No     Session Start Time: 11am  Session End Time: 11:53     Session Length: 45    Session #: 19    Attendees: Client attended alone     Treatment Plan Last Reviewed: 2/4/21 - UPDATE IN NEXT SESSION  PHQ-9 / ORVILLE-7 : 9/29/20        DATA  Interactive Complexity: No  Crisis: No       Progress Since Last Session (Related to Symptoms / Goals / Homework):   Symptoms: No change      Homework: Partially completed - has not looked into trauma referrals, reassigned.         Episode of Care Goals: Satisfactory progress - ACTION (Actively working towards change); Intervened by reinforcing change plan / affirming steps taken       Current / Ongoing Stressors and Concerns:   Ongoing: Anxiety and panic attacks while driving, history of family conflict  Current: Client discussed difficulty with aging, weight/body image concerns, and self-esteem.        Treatment Objective(s) Addressed in This Session:   Patient will use cognitive strategies identified in therapy to challenge anxious thoughts.    Patient will identify 3 initial signs or symptoms of anxiety.        Intervention:   CBT:     Explored belief systems created in childhood that impact body image and self-esteem. Discussed ways in which to address self-worth that is not related to physical appearance. Coached on positive affirmations and reframing to assist in self-talk and challenging distorted beliefs.       ASSESSMENT: Current Emotional / Mental Status (status of significant symptoms):   Risk status (Self / Other harm or suicidal ideation)   Patient denies current fears or concerns for personal safety.   Patient denies current or recent suicidal ideation or behaviors.   Patientdenies current or recent homicidal ideation or behaviors.   Patient denies current or recent self injurious behavior or ideation.   Patient  denies other safety concerns.   Patient reports there has been no change in risk factors since their last session.     Patient reports there has been no change in protective factors since their last session.     Recommended that patient call 911 or go to the local ED should there be a change in any of these risk factors.     Appearance:   Appropriate    Eye Contact:   Good    Psychomotor Behavior: Normal    Attitude:   Cooperative    Orientation:   All   Speech    Rate / Production: Normal/ Responsive    Volume:  Normal    Mood:    Anxious    Affect:    Appropriate    Thought Content:  Clear    Thought Form:  Coherent  Logical    Insight:    Good      Medication Review:   No changes to current psychiatric medication(s)     Medication Compliance:   Yes     Changes in Health Issues:   None reported     Chemical Use Review:   Substance Use: Chemical use reviewed, no active concerns identified      Tobacco Use: No current tobacco use.      Diagnosis:  1. Generalized anxiety disorder    2. Panic disorder without agoraphobia         Collateral Reports Completed:   Not Applicable    PLAN: (Patient Tasks / Therapist Tasks / Other)  HOMEWORK: Reduce alcohol intake - 1 drink total Mon-wed. Journaling.     Eunice Mixon MA, [facility]                                                          ______________________________________________________________________    Treatment Plan    Patient's Name: Tracee Cooper  YOB: 1970    Date: 2/4/21    Diagnoses:  300.02 (F41.1) Generalized Anxiety Disorder   (f41.0) Panic disorder without agoraphobia  Rule out Posttraumatic Stress Disorder  Psychosocial & Contextual Factors: Past trauma, children's mental health concerns, work stressors  WHODAS: Completed    Referral / Collaboration:  Referral to another professional/service is not indicated at this time.    Anticipated number of session or this episode of care: 20      MeasurableTreatment Goal(s) related to diagnosis /  functional impairment(s)  Goal 1: Patient will decrease anxiety levels as evidence by ORVILLE-7 scores, and client report.    I will know I've met my goal when I have less panic attacks, when I can drive to a new place without feeling anxious or panicked.      Objective #A (Patient Action)    Patient will identify 3 initial signs or symptoms of anxiety.  Status: Continued - Date(s): 12/16/19, 11/2/20, 2/4/21    Intervention(s)  Therapist will assign homework    teach emotional recognition/identification.      Objective #B  Patient will use cognitive strategies identified in therapy to challenge anxious thoughts.  Status: Continued - Date(s): 12/16/19, 11/2/20, 2/4/21    Intervention(s)  Therapist will assign homework    teach CBT techniques to identify and challenge cognitive distortions.    Objective #C  Patient will identify three distraction and diversion activities and use those activities to decrease level of anxiety  .  Status: Continued - Date(s): 12/16/19, 11/2/20, 2/4/21    Intervention(s)  Therapist will assign homework    teach DBT techniques including Mindfulness and Distress Tolerance.        Patient has reviewed and agreed to the above plan.        Eunice Mixon MA, [facility]  5/7/21

## 2021-05-17 ENCOUNTER — VIRTUAL VISIT (OUTPATIENT)
Dept: PSYCHOLOGY | Facility: CLINIC | Age: 51
End: 2021-05-17
Payer: COMMERCIAL

## 2021-05-17 DIAGNOSIS — F41.1 GENERALIZED ANXIETY DISORDER: Primary | ICD-10-CM

## 2021-05-17 DIAGNOSIS — F41.0 PANIC DISORDER WITHOUT AGORAPHOBIA: ICD-10-CM

## 2021-05-17 PROCEDURE — 90834 PSYTX W PT 45 MINUTES: CPT | Mod: 95 | Performed by: COUNSELOR

## 2021-05-17 NOTE — PROGRESS NOTES
Progress Note    Patient Name: Tracee Cooper  Date: 5/17/21         Service Type: Individual     Video Visit: No     Session Start Time: 11am  Session End Time: 11:45     Session Length: 45    Session #: 20    Attendees: Client attended alone     Treatment Plan Last Reviewed: 5/17/21  PHQ-9 / ORVILLE-7 : 9/29/20        DATA  Interactive Complexity: No  Crisis: No       Progress Since Last Session (Related to Symptoms / Goals / Homework):   Symptoms: No change      Homework: Partially completed - has not looked into trauma referrals, reassigned.         Episode of Care Goals: Satisfactory progress - ACTION (Actively working towards change); Intervened by reinforcing change plan / affirming steps taken       Current / Ongoing Stressors and Concerns:   Ongoing: Anxiety and panic attacks while driving, history of family conflict  Current: Client discussed difficulty with aging, weight/body image concerns, and self-esteem. Reports being more aware of this in the past week since discussing in previous sessions.        Treatment Objective(s) Addressed in This Session:   Patient will use cognitive strategies identified in therapy to challenge anxious thoughts.    Patient will identify 3 initial signs or symptoms of anxiety.        Intervention:   CBT:     Addressed homework completion and barriers to consistent journaling. Assisted with problem-solving barriers. Discussed cognitive distortions around physical appearance, practiced reframing and positive affirmations. Discussed EMDR referral, as well as specific treatment options for body image/disordered eating history.      ASSESSMENT: Current Emotional / Mental Status (status of significant symptoms):   Risk status (Self / Other harm or suicidal ideation)   Patient denies current fears or concerns for personal safety.   Patient denies current or recent suicidal ideation or behaviors.   Patientdenies current or recent homicidal  ideation or behaviors.   Patient denies current or recent self injurious behavior or ideation.   Patient denies other safety concerns.   Patient reports there has been no change in risk factors since their last session.     Patient reports there has been no change in protective factors since their last session.     Recommended that patient call 911 or go to the local ED should there be a change in any of these risk factors.     Appearance:   Appropriate    Eye Contact:   Good    Psychomotor Behavior: Normal    Attitude:   Cooperative    Orientation:   All   Speech    Rate / Production: Normal/ Responsive    Volume:  Normal    Mood:    Anxious    Affect:    Appropriate    Thought Content:  Clear    Thought Form:  Coherent  Logical    Insight:    Good      Medication Review:   No changes to current psychiatric medication(s)     Medication Compliance:   Yes     Changes in Health Issues:   None reported     Chemical Use Review:   Substance Use: Chemical use reviewed, no active concerns identified      Tobacco Use: No current tobacco use.      Diagnosis:  1. Generalized anxiety disorder    2. Panic disorder without agoraphobia         Collateral Reports Completed:   Not Applicable      PLAN: (Patient Tasks / Therapist Tasks / Other)  HOMEWORK:  Reassigned:  Reduce alcohol intake - 1 drink total Mon-wed.   Journal entry - 4 times weekly, set a reminder on phone to complete around 4pm. Put post-its on mirror to try to challenge self-talk.    EMDR- schedule appt        Eunice Mixon MA, [facility]                                                          ______________________________________________________________________    Treatment Plan    Patient's Name: Tracee Cooper  YOB: 1970    Date: 5/17/21    Diagnoses:  300.02 (F41.1) Generalized Anxiety Disorder   (f41.0) Panic disorder without agoraphobia  Rule out Posttraumatic Stress Disorder  Psychosocial & Contextual Factors: Past trauma, children's mental  health concerns, work stressors  WHODAS: Completed    Referral / Collaboration:  Referral to another professional/service is not indicated at this time.    Anticipated number of session or this episode of care: 20      MeasurableTreatment Goal(s) related to diagnosis / functional impairment(s)  Goal 1: Patient will decrease anxiety levels as evidence by ORVILLE-7 scores, and client report.    I will know I've met my goal when I have less panic attacks, when I can drive to a new place without feeling anxious or panicked.      Objective #A (Patient Action)    Patient will identify 3 initial signs or symptoms of anxiety.  Status: Continued - Date(s): 12/16/19, 11/2/20, 2/4/21, 5/17/21    Intervention(s)  Therapist will assign homework    teach emotional recognition/identification.      Objective #B  Patient will use cognitive strategies identified in therapy to challenge anxious thoughts.  Status: Continued - Date(s): 12/16/19, 11/2/20, 2/4/21, 5/17/21    Intervention(s)  Therapist will assign homework    teach CBT techniques to identify and challenge cognitive distortions.    Objective #C  Patient will identify three distraction and diversion activities and use those activities to decrease level of anxiety  .  Status: Continued - Date(s): 12/16/19, 11/2/20, 2/4/21, 5/17/21    Intervention(s)  Therapist will assign homework    teach DBT techniques including Mindfulness and Distress Tolerance.        Patient has reviewed and agreed to the above plan.        Eunice Mixon MA, [facility]  5/17/21

## 2021-05-24 ENCOUNTER — VIRTUAL VISIT (OUTPATIENT)
Dept: PSYCHOLOGY | Facility: CLINIC | Age: 51
End: 2021-05-24
Payer: COMMERCIAL

## 2021-05-24 DIAGNOSIS — F41.0 PANIC DISORDER WITHOUT AGORAPHOBIA: ICD-10-CM

## 2021-05-24 DIAGNOSIS — F41.1 GENERALIZED ANXIETY DISORDER: Primary | ICD-10-CM

## 2021-05-24 PROCEDURE — 90834 PSYTX W PT 45 MINUTES: CPT | Mod: 95 | Performed by: COUNSELOR

## 2021-05-24 NOTE — PROGRESS NOTES
Progress Note    Patient Name: Tracee Cooper  Date: 5/24/21         Service Type: Individual     Video Visit: No     Session Start Time: 2:30m  Session End Time: 3:12     Session Length: 42    Session #: 21    Attendees: Client attended alone     Treatment Plan Last Reviewed: 5/17/21  PHQ-9 / ORVILLE-7 : 9/29/20        DATA  Interactive Complexity: No  Crisis: No       Progress Since Last Session (Related to Symptoms / Goals / Homework):   Symptoms: No change      Homework: Partially completed - has not looked into trauma referral        Episode of Care Goals: Satisfactory progress - ACTION (Actively working towards change); Intervened by reinforcing change plan / affirming steps taken       Current / Ongoing Stressors and Concerns:   Ongoing: Anxiety and panic attacks while driving, history of family conflict  Current: Client discussed barriers to EMDR referral, self-judgment that she should be able to figure it out herself, fear. Discussed progress with some treatment goals, and reducing alcohol intake effectively.        Treatment Objective(s) Addressed in This Session:   Patient will use cognitive strategies identified in therapy to challenge anxious thoughts.    Patient will identify 3 initial signs or symptoms of anxiety.        Intervention:   CBT:     Explored barriers to EMDR referral. Prompted emotion identification, processed fears. Explored alternative treatment modalities to meet goals. Reviewed treatment goals and progress.      ASSESSMENT: Current Emotional / Mental Status (status of significant symptoms):   Risk status (Self / Other harm or suicidal ideation)   Patient denies current fears or concerns for personal safety.   Patient denies current or recent suicidal ideation or behaviors.   Patientdenies current or recent homicidal ideation or behaviors.   Patient denies current or recent self injurious behavior or ideation.   Patient denies other safety  concerns.   Patient reports there has been no change in risk factors since their last session.     Patient reports there has been no change in protective factors since their last session.     Recommended that patient call 911 or go to the local ED should there be a change in any of these risk factors.     Appearance:   Appropriate    Eye Contact:   Good    Psychomotor Behavior: Normal    Attitude:   Cooperative    Orientation:   All   Speech    Rate / Production: Normal/ Responsive    Volume:  Normal    Mood:    Anxious    Affect:    Appropriate    Thought Content:  Clear    Thought Form:  Coherent  Logical    Insight:    Good      Medication Review:   No changes to current psychiatric medication(s)     Medication Compliance:   Yes     Changes in Health Issues:   None reported     Chemical Use Review:   Substance Use: Chemical use reviewed, no active concerns identified      Tobacco Use: No current tobacco use.      Diagnosis:  1. Generalized anxiety disorder    2. Panic disorder without agoraphobia         Collateral Reports Completed:   Not Applicable      PLAN: (Patient Tasks / Therapist Tasks / Other)  HOMEWORK:  Reassigned:  Reduce alcohol intake - 1 drink total Mon-wed.   Journal entry - 4 times weekly, set a reminder on phone to complete around 4pm. Put post-its on mirror to try to challenge self-talk.    Fear hierarchy development    Drive everyday  [facility]       Defer: EMDR- schedule appt        Eunice Mixon MA, [facility]                                                          ______________________________________________________________________    Treatment Plan    Patient's Name: Tracee Cooper  YOB: 1970    Date: 5/17/21    Diagnoses:  300.02 (F41.1) Generalized Anxiety Disorder   (f41.0) Panic disorder without agoraphobia  Rule out Posttraumatic Stress Disorder  Psychosocial & Contextual Factors: Past trauma, children's mental health concerns, work  stressors  WHODAS: Completed    Referral / Collaboration:  Referral to another professional/service is not indicated at this time.    Anticipated number of session or this episode of care: 20      MeasurableTreatment Goal(s) related to diagnosis / functional impairment(s)  Goal 1: Patient will decrease anxiety levels as evidence by ORVILLE-7 scores, and client report.    I will know I've met my goal when I have less panic attacks, when I can drive to a new place without feeling anxious or panicked.      Objective #A (Patient Action)    Patient will identify 3 initial signs or symptoms of anxiety.  Status: Continued - Date(s): 12/16/19, 11/2/20, 2/4/21, 5/17/21    Intervention(s)  Therapist will assign homework    teach emotional recognition/identification.      Objective #B  Patient will use cognitive strategies identified in therapy to challenge anxious thoughts.  Status: Continued - Date(s): 12/16/19, 11/2/20, 2/4/21, 5/17/21    Intervention(s)  Therapist will assign homework    teach CBT techniques to identify and challenge cognitive distortions.    Objective #C  Patient will identify three distraction and diversion activities and use those activities to decrease level of anxiety  .  Status: Continued - Date(s): 12/16/19, 11/2/20, 2/4/21, 5/17/21    Intervention(s)  Therapist will assign homework    teach DBT techniques including Mindfulness and Distress Tolerance.        Patient has reviewed and agreed to the above plan.        Eunice Mixon MA, [facility]  5/24/21

## 2021-06-09 ENCOUNTER — MYC REFILL (OUTPATIENT)
Dept: FAMILY MEDICINE | Facility: CLINIC | Age: 51
End: 2021-06-09

## 2021-06-09 DIAGNOSIS — F41.1 GENERALIZED ANXIETY DISORDER: ICD-10-CM

## 2021-06-10 RX ORDER — ALPRAZOLAM 1 MG
TABLET ORAL
Qty: 20 TABLET | Refills: 0 | Status: SHIPPED | OUTPATIENT
Start: 2021-06-10 | End: 2021-07-30

## 2021-06-10 NOTE — TELEPHONE ENCOUNTER
Routing refill request to provider for review/approval because:  Drug not on the [facility] refill protocol   Krysatl Mcgrath RN

## 2021-06-25 ENCOUNTER — VIRTUAL VISIT (OUTPATIENT)
Dept: PSYCHOLOGY | Facility: CLINIC | Age: 51
End: 2021-06-25
Payer: COMMERCIAL

## 2021-06-25 DIAGNOSIS — F41.1 GENERALIZED ANXIETY DISORDER: Primary | ICD-10-CM

## 2021-06-25 DIAGNOSIS — F41.0 PANIC DISORDER WITHOUT AGORAPHOBIA: ICD-10-CM

## 2021-06-25 PROCEDURE — 90834 PSYTX W PT 45 MINUTES: CPT | Mod: 95 | Performed by: COUNSELOR

## 2021-06-25 NOTE — PROGRESS NOTES
Progress Note    Patient Name: Tracee Cooper  Date: 6/25/21         Service Type: Individual     Video Visit: No     Session Start Time: 8:30 am  Session End Time: 9:15     Session Length: 45    Session #: 22    Attendees: Client attended alone     Treatment Plan Last Reviewed: 5/17/21  PHQ-9 / ORVILLE-7 :         DATA  Interactive Complexity: No  Crisis: No       Progress Since Last Session (Related to Symptoms / Goals / Homework):   Symptoms: No change         Homework: Achieved / completed to satisfaction         Episode of Care Goals: Satisfactory progress - ACTION (Actively working towards change); Intervened by reinforcing change plan / affirming steps taken       Current / Ongoing Stressors and Concerns:   Ongoing: Anxiety and panic attacks while driving, history of family conflict  Current:   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 Feels her driving has been improving and responding with effective skill use. Expresses feeling hurt around the lack of support and follow-through she feels from her  around her business.        Treatment Objective(s) Addressed in This Session:   Patient will use cognitive strategies identified in therapy to challenge anxious thoughts.    Patient will identify 3 initial signs or symptoms of anxiety.        Intervention:   CBT:   Offered validation for emotional experience. Utilized open-ended questions, summary and reflection to assist with insight. Reflected cognitive distortions, modeled challenge. Coached and role-played effective boundary and assertiveness skills.       ASSESSMENT: Current Emotional / Mental Status (status of significant symptoms):   Risk status (Self / Other harm or suicidal ideation)   Patient denies current fears or concerns for personal safety.   Patient denies current or recent suicidal ideation or  behaviors.   Patientdenies current or recent homicidal ideation or behaviors.   Patient denies current or recent self injurious behavior or ideation.   Patient denies other safety concerns.   Patient reports there has been no change in risk factors since their last session.     Patient reports there has been no change in protective factors since their last session.     Recommended that patient call 911 or go to the local ED should there be a change in any of these risk factors.     Appearance:   Appropriate    Eye Contact:   Good    Psychomotor Behavior: Normal    Attitude:   Cooperative    Orientation:   All   Speech    Rate / Production: Normal/ Responsive    Volume:  Normal    Mood:    Anxious    Affect:    Appropriate    Thought Content:  Clear    Thought Form:  Coherent  Logical    Insight:    Good      Medication Review:   No changes to current psychiatric medication(s)     Medication Compliance:   Yes     Changes in Health Issues:   None reported     Chemical Use Review:   Substance Use: Chemical use reviewed, no active concerns identified      Tobacco Use: No current tobacco use.      Diagnosis:  1. Generalized anxiety disorder    2. Panic disorder without agoraphobia         Collateral Reports Completed:   Not Applicable      PLAN: (Patient Tasks / Therapist Tasks / Other)  HOMEWORK:  Reassigned:  Reduce alcohol intake - 1 drink total Mon-wed.   Journal entry - 4 times weekly, set a reminder on phone to complete around 4pm. Put post-its on mirror to try to challenge self-talk.    Fear hierarchy development    New:  -When feeling overwhelmed: identify, validate, take a break, then problem solve.   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       Defer: EMDR- schedule appt              Eunice Mixon MA, [facility]                                                          ______________________________________________________________________    Treatment Plan    Patient's Name:  Tracee Cooper  YOB: 1970    Date: 5/17/21    Diagnoses:  300.02 (F41.1) Generalized Anxiety Disorder   (f41.0) Panic disorder without agoraphobia  Rule out Posttraumatic Stress Disorder  Psychosocial & Contextual Factors: Past trauma, children's mental health concerns, work stressors  WHODAS: Completed    Referral / Collaboration:  Referral to another professional/service is not indicated at this time.    Anticipated number of session or this episode of care: 20      MeasurableTreatment Goal(s) related to diagnosis / functional impairment(s)  Goal 1: Patient will decrease anxiety levels as evidence by ORVILLE-7 scores, and client report.    I will know I've met my goal when I have less panic attacks, when I can drive to a new place without feeling anxious or panicked.      Objective #A (Patient Action)    Patient will identify 3 initial signs or symptoms of anxiety.  Status: Continued - Date(s): 12/16/19, 11/2/20, 2/4/21, 5/17/21    Intervention(s)  Therapist will assign homework    teach emotional recognition/identification.      Objective #B  Patient will use cognitive strategies identified in therapy to challenge anxious thoughts.  Status: Continued - Date(s): 12/16/19, 11/2/20, 2/4/21, 5/17/21    Intervention(s)  Therapist will assign homework    teach CBT techniques to identify and challenge cognitive distortions.    Objective #C  Patient will identify three distraction and diversion activities and use those activities to decrease level of anxiety  .  Status: Continued - Date(s): 12/16/19, 11/2/20, 2/4/21, 5/17/21    Intervention(s)  Therapist will assign homework    teach DBT techniques including Mindfulness and Distress Tolerance.        Patient has reviewed and agreed to the above plan.        Eunice Mixon MA, [facility]  6/25/21

## 2021-07-07 ENCOUNTER — VIRTUAL VISIT (OUTPATIENT)
Dept: PSYCHOLOGY | Facility: CLINIC | Age: 51
End: 2021-07-07
Payer: COMMERCIAL

## 2021-07-07 DIAGNOSIS — F41.1 GENERALIZED ANXIETY DISORDER: Primary | ICD-10-CM

## 2021-07-07 PROCEDURE — 90834 PSYTX W PT 45 MINUTES: CPT | Mod: 95 | Performed by: COUNSELOR

## 2021-07-07 NOTE — PROGRESS NOTES
Progress Note    Patient Name: Tracee Cooper  Date: 7/7/21         Service Type: Individual     Video Visit: No     Session Start Time: 3 pm  Session End Time: 3:45     Session Length: 45    Session #: 23    Attendees: Client attended alone     Treatment Plan Last Reviewed: 5/17/21  PHQ-9 / ORVILLE-7 :         DATA  Interactive Complexity: No  Crisis: No       Progress Since Last Session (Related to Symptoms / Goals / Homework):   Symptoms: No change         Homework: Partially completed         Episode of Care Goals: Satisfactory progress - ACTION (Actively working towards change); Intervened by reinforcing change plan / affirming steps taken       Current / Ongoing Stressors and Concerns:   Ongoing: Anxiety and panic attacks while driving, history of family conflict  Current: Discussed interpersonal conflict with family members, and feeling hurt around finding out her daughter has been planning aspects of her wedding without including client.        Treatment Objective(s) Addressed in This Session:   Patient will use cognitive strategies identified in therapy to challenge anxious thoughts.    Patient will identify 3 initial signs or symptoms of anxiety.        Intervention:   CBT:    Offered validation for emotional experience. Utilized open-ended questions, summary and reflection to assist with insight. Reflected cognitive distortions, modeled challenge. Offered pscyhoeducation around interpersonal skills to assist with effective expression of feelings.       ASSESSMENT: Current Emotional / Mental Status (status of significant symptoms):   Risk status (Self / Other harm or suicidal ideation)   Patient denies current fears or concerns for personal safety.   Patient denies current or recent suicidal ideation or behaviors.   Patientdenies current or recent homicidal ideation or behaviors.   Patient denies current or recent self injurious behavior or ideation.   Patient  denies other safety concerns.   Patient reports there has been no change in risk factors since their last session.     Patient reports there has been no change in protective factors since their last session.     Recommended that patient call 911 or go to the local ED should there be a change in any of these risk factors.     Appearance:   Appropriate    Eye Contact:   Good    Psychomotor Behavior: Normal    Attitude:   Cooperative    Orientation:   All   Speech    Rate / Production: Normal/ Responsive    Volume:  Normal    Mood:    Anxious    Affect:    Appropriate    Thought Content:  Clear    Thought Form:  Coherent  Logical    Insight:    Good      Medication Review:   No changes to current psychiatric medication(s)     Medication Compliance:   Yes     Changes in Health Issues:   None reported     Chemical Use Review:   Substance Use: Chemical use reviewed, no active concerns identified      Tobacco Use: No current tobacco use.      Diagnosis:  1. Generalized anxiety disorder         Collateral Reports Completed:   Not Applicable      PLAN: (Patient Tasks / Therapist Tasks / Other)  HOMEWORK: Practice interpersonal skills with daughter to assist with conflict resolution and effective expression of emotions.    Eunice Mixon MA, [facility]                                                          ______________________________________________________________________    Treatment Plan    Patient's Name: Tracee Cooper  YOB: 1970    Date: 5/17/21    Diagnoses:  300.02 (F41.1) Generalized Anxiety Disorder   (f41.0) Panic disorder without agoraphobia  Rule out Posttraumatic Stress Disorder  Psychosocial & Contextual Factors: Past trauma, children's mental health concerns, work stressors  WHODAS: Completed    Referral / Collaboration:  Referral to another professional/service is not indicated at this time.    Anticipated number of session or this episode of care: 20      MeasurableTreatment Goal(s) related  to diagnosis / functional impairment(s)  Goal 1: Patient will decrease anxiety levels as evidence by ORVILLE-7 scores, and client report.    I will know I've met my goal when I have less panic attacks, when I can drive to a new place without feeling anxious or panicked.      Objective #A (Patient Action)    Patient will identify 3 initial signs or symptoms of anxiety.  Status: Continued - Date(s): 12/16/19, 11/2/20, 2/4/21, 5/17/21    Intervention(s)  Therapist will assign homework    teach emotional recognition/identification.      Objective #B  Patient will use cognitive strategies identified in therapy to challenge anxious thoughts.  Status: Continued - Date(s): 12/16/19, 11/2/20, 2/4/21, 5/17/21    Intervention(s)  Therapist will assign homework    teach CBT techniques to identify and challenge cognitive distortions.    Objective #C  Patient will identify three distraction and diversion activities and use those activities to decrease level of anxiety  .  Status: Continued - Date(s): 12/16/19, 11/2/20, 2/4/21, 5/17/21    Intervention(s)  Therapist will assign homework    teach DBT techniques including Mindfulness and Distress Tolerance.        Patient has reviewed and agreed to the above plan.        Eunice Mixon MA, [facility]  7/7/21

## 2021-07-16 ENCOUNTER — VIRTUAL VISIT (OUTPATIENT)
Dept: PSYCHOLOGY | Facility: CLINIC | Age: 51
End: 2021-07-16
Payer: COMMERCIAL

## 2021-07-16 DIAGNOSIS — F41.0 PANIC DISORDER WITHOUT AGORAPHOBIA: ICD-10-CM

## 2021-07-16 DIAGNOSIS — F41.1 GENERALIZED ANXIETY DISORDER: Primary | ICD-10-CM

## 2021-07-16 PROCEDURE — 90834 PSYTX W PT 45 MINUTES: CPT | Mod: 95 | Performed by: COUNSELOR

## 2021-07-16 NOTE — PROGRESS NOTES
Progress Note    Patient Name: Tracee Cooper  Date: 7/16/21         Service Type: Individual     Video Visit: No     Session Start Time: 11am  Session End Time: 11:45     Session Length: 45    Session #: 24    Attendees: Client attended alone     Treatment Plan Last Reviewed: 5/17/21  PHQ-9 / ORVILLE-7 :         DATA  Interactive Complexity: No  Crisis: No       Progress Since Last Session (Related to Symptoms / Goals / Homework):   Symptoms: Improving         Homework: Achieved / completed to satisfaction         Episode of Care Goals: Satisfactory progress - ACTION (Actively working towards change); Intervened by reinforcing change plan / affirming steps taken       Current / Ongoing Stressors and Concerns:   Ongoing: Anxiety and panic attacks while driving, history of family conflict  Current: Reports talking with her daughter about wedding planning concerns and had a positive conversation with her about this. Discussed anxiety triggers, ways in which she is managing them, and areas where she would like to improve.       Treatment Objective(s) Addressed in This Session:   Patient will use cognitive strategies identified in therapy to challenge anxious thoughts.    Patient will identify 3 initial signs or symptoms of anxiety.        Intervention:   CBT:   Prompted emotion identification, processed fears. Reinforced effective communication skill use. Reviewed anxiety coping skills to increase confidence in managing triggers. Reviewed EMDR referral options.       ASSESSMENT: Current Emotional / Mental Status (status of significant symptoms):   Risk status (Self / Other harm or suicidal ideation)   Patient denies current fears or concerns for personal safety.   Patient denies current or recent suicidal ideation or behaviors.   Patientdenies current or recent homicidal ideation or behaviors.   Patient denies current or recent self injurious behavior or ideation.   Patient  denies other safety concerns.   Patient reports there has been no change in risk factors since their last session.     Patient reports there has been no change in protective factors since their last session.     Recommended that patient call 911 or go to the local ED should there be a change in any of these risk factors.     Appearance:   Appropriate    Eye Contact:   Good    Psychomotor Behavior: Normal    Attitude:   Cooperative    Orientation:   All   Speech    Rate / Production: Normal/ Responsive    Volume:  Normal    Mood:    Anxious    Affect:    Appropriate    Thought Content:  Clear    Thought Form:  Coherent  Logical    Insight:    Good      Medication Review:   No changes to current psychiatric medication(s)     Medication Compliance:   Yes     Changes in Health Issues:   None reported     Chemical Use Review:   Substance Use: Chemical use reviewed, no active concerns identified      Tobacco Use: No current tobacco use.      Diagnosis:  1. Generalized anxiety disorder    2. Panic disorder without agoraphobia         Collateral Reports Completed:   Not Applicable      PLAN: (Patient Tasks / Therapist Tasks / Other)  HOMEWORK:  Taking steps to getting EMDR scheduled.         Eunice Mixon MA, [facility]                                                          ______________________________________________________________________    Treatment Plan    Patient's Name: Tracee Cooper  YOB: 1970    Date: 5/17/21    Diagnoses:  300.02 (F41.1) Generalized Anxiety Disorder   (f41.0) Panic disorder without agoraphobia  Rule out Posttraumatic Stress Disorder  Psychosocial & Contextual Factors: Past trauma, children's mental health concerns, work stressors  WHODAS: Completed    Referral / Collaboration:  Referral to another professional/service is not indicated at this time.    Anticipated number of session or this episode of care: 20      MeasurableTreatment Goal(s) related to diagnosis / functional  impairment(s)  Goal 1: Patient will decrease anxiety levels as evidence by ORVILLE-7 scores, and client report.    I will know I've met my goal when I have less panic attacks, when I can drive to a new place without feeling anxious or panicked.      Objective #A (Patient Action)    Patient will identify 3 initial signs or symptoms of anxiety.  Status: Continued - Date(s): 12/16/19, 11/2/20, 2/4/21, 5/17/21    Intervention(s)  Therapist will assign homework    teach emotional recognition/identification.      Objective #B  Patient will use cognitive strategies identified in therapy to challenge anxious thoughts.  Status: Continued - Date(s): 12/16/19, 11/2/20, 2/4/21, 5/17/21    Intervention(s)  Therapist will assign homework    teach CBT techniques to identify and challenge cognitive distortions.    Objective #C  Patient will identify three distraction and diversion activities and use those activities to decrease level of anxiety  .  Status: Continued - Date(s): 12/16/19, 11/2/20, 2/4/21, 5/17/21    Intervention(s)  Therapist will assign homework    teach DBT techniques including Mindfulness and Distress Tolerance.        Patient has reviewed and agreed to the above plan.        Eunice Mixon MA, [facility]  7/16/21

## 2021-07-22 ENCOUNTER — VIRTUAL VISIT (OUTPATIENT)
Dept: PSYCHOLOGY | Facility: CLINIC | Age: 51
End: 2021-07-22
Payer: COMMERCIAL

## 2021-07-22 DIAGNOSIS — F41.0 PANIC DISORDER WITHOUT AGORAPHOBIA: ICD-10-CM

## 2021-07-22 DIAGNOSIS — F41.1 GENERALIZED ANXIETY DISORDER: Primary | ICD-10-CM

## 2021-07-22 PROCEDURE — 90834 PSYTX W PT 45 MINUTES: CPT | Mod: 95 | Performed by: COUNSELOR

## 2021-07-22 NOTE — PROGRESS NOTES
Progress Note    Patient Name: Tracee Cooper  Date: 7/22/21         Service Type: Individual     Video Visit: No     Session Start Time: 10:30am  Session End Time: 11:15     Session Length: 45    Session #: 25    Attendees: Client attended alone     Treatment Plan Last Reviewed: 5/17/21  PHQ-9 / ORVILLE-7 :         DATA  Interactive Complexity: No  Crisis: No       Progress Since Last Session (Related to Symptoms / Goals / Homework):   Symptoms: Improving - notes some improvement       Homework: Did not complete         Episode of Care Goals: Satisfactory progress - ACTION (Actively working towards change); Intervened by reinforcing change plan / affirming steps taken       Current / Ongoing Stressors and Concerns:   Ongoing: Anxiety and panic attacks while driving, history of family conflict  Current:  Discussed feeling positively about how she is handling interactions with her daughter. Notes difficulty with scheduling appointments, possible anxiety barriers.       Treatment Objective(s) Addressed in This Session:   Patient will use cognitive strategies identified in therapy to challenge anxious thoughts.    Patient will identify 3 initial signs or symptoms of anxiety.        Intervention:   CBT:     Reviewed homework. Explored barriers to EMDR referral. Prompted emotion identification, processed fears. Assisted in problem solving. Coached on generalizing skill use. Reviewed coping skills to assist with panic.*      ASSESSMENT: Current Emotional / Mental Status (status of significant symptoms):   Risk status (Self / Other harm or suicidal ideation)   Patient denies current fears or concerns for personal safety.   Patient denies current or recent suicidal ideation or behaviors.   Patientdenies current or recent homicidal ideation or behaviors.   Patient denies current or recent self injurious behavior or ideation.   Patient denies other safety concerns.   Patient reports  there has been no change in risk factors since their last session.     Patient reports there has been no change in protective factors since their last session.     Recommended that patient call 911 or go to the local ED should there be a change in any of these risk factors.     Appearance:   Appropriate    Eye Contact:   Good    Psychomotor Behavior: Normal    Attitude:   Cooperative    Orientation:   All   Speech    Rate / Production: Normal/ Responsive    Volume:  Normal    Mood:    Anxious    Affect:    Appropriate    Thought Content:  Clear    Thought Form:  Coherent  Logical    Insight:    Good      Medication Review:   No changes to current psychiatric medication(s)     Medication Compliance:   Yes     Changes in Health Issues:   None reported     Chemical Use Review:   Substance Use: Chemical use reviewed, no active concerns identified      Tobacco Use: No current tobacco use.      Diagnosis:  1. Generalized anxiety disorder    2. Panic disorder without agoraphobia         Collateral Reports Completed:   Not Applicable      PLAN: (Patient Tasks / Therapist Tasks / Other)  HOMEWORK:  Taking steps to getting EMDR scheduled along with other appointments by using One Thing at a [employer] skill.         Eunice Mixon MA, [facility]                                                          ______________________________________________________________________    Treatment Plan    Patient's Name: Tracee Cooper  YOB: 1970    Date: 5/17/21    Diagnoses:  300.02 (F41.1) Generalized Anxiety Disorder   (f41.0) Panic disorder without agoraphobia  Rule out Posttraumatic Stress Disorder  Psychosocial & Contextual Factors: Past trauma, children's mental health concerns, work stressors  WHODAS: Completed    Referral / Collaboration:  Referral to another professional/service is not indicated at this time.    Anticipated number of session or this episode of care: 20      MeasurableTreatment Goal(s) related to diagnosis  / functional impairment(s)  Goal 1: Patient will decrease anxiety levels as evidence by ORVILLE-7 scores, and client report.    I will know I've met my goal when I have less panic attacks, when I can drive to a new place without feeling anxious or panicked.      Objective #A (Patient Action)    Patient will identify 3 initial signs or symptoms of anxiety.  Status: Continued - Date(s): 12/16/19, 11/2/20, 2/4/21, 5/17/21    Intervention(s)  Therapist will assign homework    teach emotional recognition/identification.      Objective #B  Patient will use cognitive strategies identified in therapy to challenge anxious thoughts.  Status: Continued - Date(s): 12/16/19, 11/2/20, 2/4/21, 5/17/21    Intervention(s)  Therapist will assign homework    teach CBT techniques to identify and challenge cognitive distortions.    Objective #C  Patient will identify three distraction and diversion activities and use those activities to decrease level of anxiety  .  Status: Continued - Date(s): 12/16/19, 11/2/20, 2/4/21, 5/17/21    Intervention(s)  Therapist will assign homework    teach DBT techniques including Mindfulness and Distress Tolerance.        Patient has reviewed and agreed to the above plan.        Eunice Mixon MA, [facility]  7/22/21

## 2021-07-30 ENCOUNTER — MYC REFILL (OUTPATIENT)
Dept: FAMILY MEDICINE | Facility: CLINIC | Age: 51
End: 2021-07-30

## 2021-07-30 DIAGNOSIS — F41.1 GENERALIZED ANXIETY DISORDER: ICD-10-CM

## 2021-07-30 RX ORDER — ALPRAZOLAM 1 MG
TABLET ORAL
Qty: 20 TABLET | Refills: 0 | Status: SHIPPED | OUTPATIENT
Start: 2021-07-30 | End: 2021-09-30

## 2021-07-30 RX ORDER — ALPRAZOLAM 1 MG
TABLET ORAL
Qty: 20 TABLET | Refills: 0 | Status: CANCELLED | OUTPATIENT
Start: 2021-07-30

## 2021-08-02 ENCOUNTER — VIRTUAL VISIT (OUTPATIENT)
Dept: PSYCHOLOGY | Facility: CLINIC | Age: 51
End: 2021-08-02
Payer: COMMERCIAL

## 2021-08-02 ENCOUNTER — MYC MEDICAL ADVICE (OUTPATIENT)
Dept: FAMILY MEDICINE | Facility: CLINIC | Age: 51
End: 2021-08-02

## 2021-08-02 DIAGNOSIS — F41.1 GENERALIZED ANXIETY DISORDER: Primary | ICD-10-CM

## 2021-08-02 DIAGNOSIS — F41.1 GENERALIZED ANXIETY DISORDER: ICD-10-CM

## 2021-08-02 DIAGNOSIS — F41.0 PANIC DISORDER WITHOUT AGORAPHOBIA: ICD-10-CM

## 2021-08-02 PROCEDURE — 90834 PSYTX W PT 45 MINUTES: CPT | Mod: 95 | Performed by: COUNSELOR

## 2021-08-02 RX ORDER — VENLAFAXINE HYDROCHLORIDE 150 MG/1
CAPSULE, EXTENDED RELEASE ORAL
Qty: 90 CAPSULE | Refills: 1 | Status: SHIPPED | OUTPATIENT
Start: 2021-08-02 | End: 2022-01-11

## 2021-08-02 NOTE — PROGRESS NOTES
Progress Note    Patient Name: Tracee Cooper  Date: 8/2/21         Service Type: Individual     Video Visit: No     Session Start Time: 12:30 pm  Session End Time: 1:15     Session Length: 45    Session #: 26    Attendees: Client attended alone     Treatment Plan Last Reviewed: 5/17/21  PHQ-9 / ORVILLE-7 :         DATA  Interactive Complexity: No  Crisis: No       Progress Since Last Session (Related to Symptoms / Goals / Homework):   Symptoms: No change         Homework: Did not complete          Episode of Care Goals: Satisfactory progress - ACTION (Actively working towards change); Intervened by reinforcing change plan / affirming steps taken       Current / Ongoing Stressors and Concerns:   Ongoing: Anxiety and panic attacks while driving, history of family conflict  Current:  Expressed anxiety around an upcoming trip with in-laws and feeling unsupported by partner.       Treatment Objective(s) Addressed in This Session:   Patient will use cognitive strategies identified in therapy to challenge anxious thoughts.    Patient will identify 3 initial signs or symptoms of anxiety.        Intervention:   CBT:     Prompted emotion identification, processed fears. Assisted in problem solving. Coached on assertiveness and boundary identification.    ASSESSMENT: Current Emotional / Mental Status (status of significant symptoms):   Risk status (Self / Other harm or suicidal ideation)   Patient denies current fears or concerns for personal safety.   Patient denies current or recent suicidal ideation or behaviors.   Patientdenies current or recent homicidal ideation or behaviors.   Patient denies current or recent self injurious behavior or ideation.   Patient denies other safety concerns.   Patient reports there has been no change in risk factors since their last session.     Patient reports there has been no change in protective factors since their last session.     Recommended  that patient call 911 or go to the local ED should there be a change in any of these risk factors.     Appearance:   Appropriate    Eye Contact:   Good    Psychomotor Behavior: Normal    Attitude:   Cooperative    Orientation:   All   Speech    Rate / Production: Normal/ Responsive    Volume:  Normal    Mood:    Anxious    Affect:    Appropriate    Thought Content:  Clear    Thought Form:  Coherent  Logical    Insight:    Good      Medication Review:   No changes to current psychiatric medication(s)     Medication Compliance:   Yes     Changes in Health Issues:   None reported     Chemical Use Review:   Substance Use: Chemical use reviewed, no active concerns identified      Tobacco Use: No current tobacco use.      Diagnosis:  1. Generalized anxiety disorder    2. Panic disorder without agoraphobia         Collateral Reports Completed:   Not Applicable      PLAN: (Patient Tasks / Therapist Tasks / Other)  HOMEWORK:  Reassigned:Taking steps to getting EMDR scheduled along with other appointments by using One Thing at a Time skill. New: Assertiveness practice and negotiation. [place] ahead skills plan.         Eunice Mixon MA, [facility]                                                          ______________________________________________________________________    Treatment Plan    Patient's Name: Tracee Cooper  YOB: 1970    Date: 5/17/21    Diagnoses:  300.02 (F41.1) Generalized Anxiety Disorder   (f41.0) Panic disorder without agoraphobia  Rule out Posttraumatic Stress Disorder  Psychosocial & Contextual Factors: Past trauma, children's mental health concerns, work stressors  WHODAS: Completed    Referral / Collaboration:  Referral to another professional/service is not indicated at this time.    Anticipated number of session or this episode of care: 20      MeasurableTreatment Goal(s) related to diagnosis / functional impairment(s)  Goal 1: Patient will decrease anxiety levels as evidence by ORVILLE-7  scores, and client report.    I will know I've met my goal when I have less panic attacks, when I can drive to a new place without feeling anxious or panicked.      Objective #A (Patient Action)    Patient will identify 3 initial signs or symptoms of anxiety.  Status: Continued - Date(s): 12/16/19, 11/2/20, 2/4/21, 5/17/21    Intervention(s)  Therapist will assign homework    teach emotional recognition/identification.      Objective #B  Patient will use cognitive strategies identified in therapy to challenge anxious thoughts.  Status: Continued - Date(s): 12/16/19, 11/2/20, 2/4/21, 5/17/21    Intervention(s)  Therapist will assign homework    teach CBT techniques to identify and challenge cognitive distortions.    Objective #C  Patient will identify three distraction and diversion activities and use those activities to decrease level of anxiety  .  Status: Continued - Date(s): 12/16/19, 11/2/20, 2/4/21, 5/17/21    Intervention(s)  Therapist will assign homework    teach DBT techniques including Mindfulness and Distress Tolerance.        Patient has reviewed and agreed to the above plan.        Eunice Mixon MA, [facility]  8/2/21

## 2021-08-02 NOTE — TELEPHONE ENCOUNTER
I am sending in a refill. I my charted her to schedule an annual in sept /oct as she is due for pap,cholesterol and hep c . Nevaeh Goff M.D.

## 2021-08-02 NOTE — TELEPHONE ENCOUNTER
Routing refill request to provider for review/approval because:  Patient needs to be seen because:  Due for annual exam    Marcio Rodrigues RN

## 2021-08-25 ENCOUNTER — VIRTUAL VISIT (OUTPATIENT)
Dept: PSYCHOLOGY | Facility: CLINIC | Age: 51
End: 2021-08-25
Payer: COMMERCIAL

## 2021-08-25 DIAGNOSIS — F41.0 PANIC DISORDER WITHOUT AGORAPHOBIA: ICD-10-CM

## 2021-08-25 DIAGNOSIS — F41.1 GENERALIZED ANXIETY DISORDER: Primary | ICD-10-CM

## 2021-08-25 PROCEDURE — 90834 PSYTX W PT 45 MINUTES: CPT | Mod: 95 | Performed by: COUNSELOR

## 2021-08-25 NOTE — PROGRESS NOTES
"                                           Progress Note    Patient Name: Tracee Cooper  Date: 8/25/21         Service Type: Individual      Session Start Time: 9 am  Session End Time: 9:45     Session Length: 45    Session #: 27    Attendees: Client attended alone     Service Modality:  Phone Visit:     The patient has been notified of the following:      \"We have found that certain health care needs can be provided without the need for a face to face visit.  This service lets us provide the care you need with a phone conversation.       I will have full access to your [facility] medical record during this entire phone call.   I will be taking notes for your medical record.      Since this is like an office visit, we will bill your insurance company for this service.       There are potential benefits and risks of telephone visits (e.g. limits to patient confidentiality) that differ from in-person visits.?  Confidentiality still applies for telephone services, and nobody will record the visit.  It is important to be in a quiet, private space that is free of distractions (including cell phone or other devices) during the visit.??      If during the course of the call I believe a telephone visit is not appropriate, you will not be charged for this service\"     Consent has been obtained for this service by care team member: Yes     Treatment Plan Last Reviewed: 8/25/21  PHQ-9 / ORVILLE-7 :         DATA  Interactive Complexity: No  Crisis: No       Progress Since Last Session (Related to Symptoms / Goals / Homework):   Symptoms: Improving - notes some improvements in generalized anxiety       Homework: Partially completed          Episode of Care Goals: Satisfactory progress - ACTION (Actively working towards change); Intervened by reinforcing change plan / affirming steps taken       Current / Ongoing Stressors and Concerns:   Ongoing: Anxiety and panic attacks while driving, history of family conflict  Current:  " Reviewed progress in treatment goals surrounding generalized anxiety, but expresses ongoing challenges and frustration around panic while driving. Also desires an increased sense of independence and confidence.     Treatment Objective(s) Addressed in This Session:   Treatment goals reviewed and updates in session.       Intervention:   CBT:   Reinforced effective skills practice. Discussed alternative treatment options to assist with panic disorder. Reviewed treatment goal progress, assisted in goal update.     ASSESSMENT: Current Emotional / Mental Status (status of significant symptoms):   Risk status (Self / Other harm or suicidal ideation)   Patient denies current fears or concerns for personal safety.   Patient denies current or recent suicidal ideation or behaviors.   Patientdenies current or recent homicidal ideation or behaviors.   Patient denies current or recent self injurious behavior or ideation.   Patient denies other safety concerns.   Patient reports there has been no change in risk factors since their last session.     Patient reports there has been no change in protective factors since their last session.     Recommended that patient call 911 or go to the local ED should there be a change in any of these risk factors.     Appearance:   Not Assessed    Eye Contact:   Not Assessed    Psychomotor Behavior: Not Assessed    Attitude:   Cooperative    Orientation:   All   Speech    Rate / Production: Normal/ Responsive    Volume:  Normal    Mood:    Anxious    Affect:    Appropriate    Thought Content:  Clear    Thought Form:  Coherent  Logical    Insight:    Good      Medication Review:   No changes to current psychiatric medication(s)     Medication Compliance:   Yes     Changes in Health Issues:   None reported     Chemical Use Review:   Substance Use: Chemical use reviewed, no active concerns identified      Tobacco Use: No current tobacco use.      Diagnosis:  1. Generalized anxiety disorder    2.  Panic disorder without agoraphobia         Collateral Reports Completed:   Not Applicable      PLAN: (Patient Tasks / Therapist Tasks / Other)  HOMEWORK:  Reassigned:Taking steps to getting EMDR scheduled along with other appointments by using One Thing at a Time skill. New: Identify concrete steps she wishes to take to increase sense of financial independence and confidence with partner.        Eunice Mixon MA, [facility]                                                          ______________________________________________________________________    Treatment Plan    Patient's Name: Tracee Cooper  YOB: 1970    Date: 5/5/21    Diagnoses:  300.02 (F41.1) Generalized Anxiety Disorder   (f41.0) Panic disorder without agoraphobia  Rule out Posttraumatic Stress Disorder  Psychosocial & Contextual Factors: Past trauma, children's mental health concerns, work stressors  WHODAS: Completed    Referral / Collaboration:  Referral to another professional/service is not indicated at this time.    Anticipated number of session or this episode of care: 20      MeasurableTreatment Goal(s) related to diagnosis / functional impairment(s)  Goal 1: Patient will decrease anxiety levels as evidence by ORVILLE-7 scores, and client report.    I will know I've met my goal when I have less panic attacks, when I can drive to a new place without feeling anxious or panicked.      Objective #A (Patient Action)    Patient will identify 3 initial signs or symptoms of anxiety.  Status: Continued - Date(s): 12/16/19, 11/2/20, 2/4/21, 5/17/21    Intervention(s)  Therapist will assign homework    teach emotional recognition/identification.      Objective #B  Patient will use cognitive strategies identified in therapy to challenge anxious thoughts.  Status: Completed - Date: 8/25/21     Intervention(s)  Therapist will assign homework    teach CBT techniques to identify and challenge cognitive distortions.    Objective #C  Patient will  identify three distraction and diversion activities and use those activities to decrease level of anxiety  .  Status: Completed - Date: 8/25/21     Intervention(s)  Therapist will assign homework    teach DBT techniques including Mindfulness and Distress Tolerance.     Goal 2: Patient will increase sense of confidence and independence per client report.      Objective #A (Patient Action)    Patient will identify 3 ways in which she would like to increase sense of independence.  Status: New: 8/25/21    Intervention(s)  Therapist will assign homework    teach emotional recognition/identification, teach values identification.      Objective #B  Patient will use cognitive strategies identified in therapy to identify and challenge negative self-talk / self-judgement.   Status: New: 8/25/21    Intervention(s)  Therapist will assign homework    teach CBT techniques to identify and challenge cognitive distortions, teach positive affirmations    Objective #C  Patient will engage in daily practice of identifying a positive affirmation or personal success she feels proud of.  Status: New: 8/25/21    Intervention(s)  Therapist will assign homework    teach CBT techniques        Patient has reviewed and agreed to the above plan.      Eunice Mixon MA, [facility]  8/25/21

## 2021-09-09 ENCOUNTER — VIRTUAL VISIT (OUTPATIENT)
Dept: PSYCHOLOGY | Facility: CLINIC | Age: 51
End: 2021-09-09
Payer: COMMERCIAL

## 2021-09-09 DIAGNOSIS — F41.0 PANIC DISORDER WITHOUT AGORAPHOBIA: ICD-10-CM

## 2021-09-09 DIAGNOSIS — F41.1 GENERALIZED ANXIETY DISORDER: Primary | ICD-10-CM

## 2021-09-09 PROCEDURE — 90834 PSYTX W PT 45 MINUTES: CPT | Mod: 95 | Performed by: COUNSELOR

## 2021-09-09 NOTE — PROGRESS NOTES
"                                           Progress Note    Patient Name: Tracee Cooper  Date: 9/9/21         Service Type: Individual      Session Start Time: 9:30 am  Session End Time: 10:15     Session Length: 45    Session #: 28    Attendees: Client attended alone     Service Modality:  Phone Visit:     The patient has been notified of the following:      \"We have found that certain health care needs can be provided without the need for a face to face visit.  This service lets us provide the care you need with a phone conversation.       I will have full access to your [place] medical record during this entire phone call.   I will be taking notes for your medical record.      Since this is like an office visit, we will bill your insurance company for this service.       There are potential benefits and risks of telephone visits (e.g. limits to patient confidentiality) that differ from in-person visits.?  Confidentiality still applies for telephone services, and nobody will record the visit.  It is important to be in a quiet, private space that is free of distractions (including cell phone or other devices) during the visit.??      If during the course of the call I believe a telephone visit is not appropriate, you will not be charged for this service\"     Consent has been obtained for this service by care team member: Yes     Treatment Plan Last Reviewed: 8/25/21  PHQ-9 / ORVILLE-7 :         DATA  Interactive Complexity: No  Crisis: No       Progress Since Last Session (Related to Symptoms / Goals / Homework):   Symptoms: No change         Homework: Partially completed          Episode of Care Goals: Satisfactory progress - ACTION (Actively working towards change); Intervened by reinforcing change plan / affirming steps taken       Current / Ongoing Stressors and Concerns:   Ongoing: Anxiety and panic attacks while driving, history of family conflict  Current:  Reviewed progress in treatment goals surrounding " generalized anxiety, but expresses ongoing challenges and frustration around lingering triggers. Discussed progress in managing interpersonal challenges with in-laws.      Treatment Objective(s) Addressed in This Session:    Patient will use cognitive strategies identified in therapy to identify and challenge negative self-talk / self-judgement.        Intervention:   CBT:   Reinforced effective skills practice. Discussed alternative treatment options to assist with panic disorder.      ASSESSMENT: Current Emotional / Mental Status (status of significant symptoms):   Risk status (Self / Other harm or suicidal ideation)   Patient denies current fears or concerns for personal safety.   Patient denies current or recent suicidal ideation or behaviors.   Patientdenies current or recent homicidal ideation or behaviors.   Patient denies current or recent self injurious behavior or ideation.   Patient denies other safety concerns.   Patient reports there has been no change in risk factors since their last session.     Patient reports there has been no change in protective factors since their last session.     Recommended that patient call 911 or go to the local ED should there be a change in any of these risk factors.     Appearance:   Not Assessed    Eye Contact:   Not Assessed    Psychomotor Behavior: Not Assessed    Attitude:   Cooperative    Orientation:   All   Speech    Rate / Production: Normal/ Responsive    Volume:  Normal    Mood:    Anxious    Affect:    Appropriate    Thought Content:  Clear    Thought Form:  Coherent  Logical    Insight:    Good      Medication Review:   No changes to current psychiatric medication(s)     Medication Compliance:   Yes     Changes in Health Issues:   None reported     Chemical Use Review:   Substance Use: Chemical use reviewed, no active concerns identified      Tobacco Use: No current tobacco use.      Diagnosis:  1. Generalized anxiety disorder    2. Panic disorder without  agoraphobia         Collateral Reports Completed:   Not Applicable      PLAN: (Patient Tasks / Therapist Tasks / Other)  HOMEWORK:  Reassigned:Taking steps to getting EMDR scheduled along with other appointments by using [employer] at [employer] skill.  - Identify concrete steps she wishes to take to increase sense of financial independence and confidence with partner.         Eunice Mixon MA, [facility]                                                          ______________________________________________________________________    Treatment Plan    Patient's Name: Tracee Cooper  YOB: 1970    Date: 5/5/21    Diagnoses:  300.02 (F41.1) Generalized Anxiety Disorder   (f41.0) Panic disorder without agoraphobia  Rule out Posttraumatic Stress Disorder  Psychosocial & Contextual Factors: Past trauma, children's mental health concerns, work stressors  WHODAS: Completed    Referral / Collaboration:  Referral to another professional/service is not indicated at this time.    Anticipated number of session or this episode of care: 20      MeasurableTreatment Goal(s) related to diagnosis / functional impairment(s)  Goal 1: Patient will decrease anxiety levels as evidence by ORVILLE-7 scores, and client report.    I will know I've met my goal when I have less panic attacks, when I can drive to a new place without feeling anxious or panicked.      Objective #A (Patient Action)    Patient will identify 3 initial signs or symptoms of anxiety.  Status: Continued - Date(s): 12/16/19, 11/2/20, 2/4/21, 5/17/21    Intervention(s)  Therapist will assign homework    teach emotional recognition/identification.      Objective #B  Patient will use cognitive strategies identified in therapy to challenge anxious thoughts.  Status: Completed - Date: 8/25/21     Intervention(s)  Therapist will assign homework    teach CBT techniques to identify and challenge cognitive distortions.    Objective #C  Patient will identify three distraction  and diversion activities and use those activities to decrease level of anxiety  .  Status: Completed - Date: 8/25/21     Intervention(s)  Therapist will assign homework    teach DBT techniques including Mindfulness and Distress Tolerance.     Goal 2: Patient will increase sense of confidence and independence per client report.      Objective #A (Patient Action)    Patient will identify 3 ways in which she would like to increase sense of independence.  Status: New: 8/25/21    Intervention(s)  Therapist will assign homework    teach emotional recognition/identification, teach values identification.      Objective #B  Patient will use cognitive strategies identified in therapy to identify and challenge negative self-talk / self-judgement.   Status: New: 8/25/21    Intervention(s)  Therapist will assign homework    teach CBT techniques to identify and challenge cognitive distortions, teach positive affirmations    Objective #C  Patient will engage in daily practice of identifying a positive affirmation or personal success she feels proud of.  Status: New: 8/25/21    Intervention(s)  Therapist will assign homework    teach CBT techniques        Patient has reviewed and agreed to the above plan.      Eunice Mixon MA, [facility]  9/9/21

## 2021-09-12 ENCOUNTER — HEALTH MAINTENANCE LETTER (OUTPATIENT)
Age: 51
End: 2021-09-12

## 2021-09-20 ENCOUNTER — VIRTUAL VISIT (OUTPATIENT)
Dept: PSYCHOLOGY | Facility: CLINIC | Age: 51
End: 2021-09-20
Payer: COMMERCIAL

## 2021-09-20 DIAGNOSIS — F41.0 PANIC DISORDER WITHOUT AGORAPHOBIA: ICD-10-CM

## 2021-09-20 DIAGNOSIS — F41.1 GENERALIZED ANXIETY DISORDER: Primary | ICD-10-CM

## 2021-09-20 PROCEDURE — 90834 PSYTX W PT 45 MINUTES: CPT | Mod: 95 | Performed by: COUNSELOR

## 2021-09-20 NOTE — PROGRESS NOTES
"                                           Progress Note    Patient Name: Tracee Cooper  Date: 9/20/21         Service Type: Individual      Session Start Time: 11 am  Session End Time: 11:45     Session Length: 45    Session #: 29    Attendees: Client attended alone     Service Modality:  Phone Visit:     The patient has been notified of the following:      \"We have found that certain health care needs can be provided without the need for a face to face visit.  This service lets us provide the care you need with a phone conversation.       I will have full access to your [place] medical record during this entire phone call.   I will be taking notes for your medical record.      Since this is like an office visit, we will bill your insurance company for this service.       There are potential benefits and risks of telephone visits (e.g. limits to patient confidentiality) that differ from in-person visits.?  Confidentiality still applies for telephone services, and nobody will record the visit.  It is important to be in a quiet, private space that is free of distractions (including cell phone or other devices) during the visit.??      If during the course of the call I believe a telephone visit is not appropriate, you will not be charged for this service\"     Consent has been obtained for this service by care team member: Yes     Treatment Plan Last Reviewed: 8/25/21  PHQ-9 / ORVILLE-7 :         DATA  Interactive Complexity: No  Crisis: No       Progress Since Last Session (Related to Symptoms / Goals / Homework):   Symptoms: No change         Homework: Partially completed            Episode of Care Goals: Satisfactory progress - ACTION (Actively working towards change); Intervened by reinforcing change plan / affirming steps taken       Current / Ongoing Stressors and Concerns:   Ongoing: Anxiety and panic attacks while driving, history of family conflict  Current:   Client reports high anxiety related to getting " work projects done. Discussed pattern of anxiety, progress, and ways in which she still wishes to increase confidence in herself.      Treatment Objective(s) Addressed in This Session:    Patient will use cognitive strategies identified in therapy to identify and challenge negative self-talk / self-judgement.   Patient will use cognitive strategies identified in therapy to challenge anxious thoughts.       Intervention:   CBT:   Reinforced effective skills practice. Prompted use of checking the facts to respond to anxiety thoughts. Explored additional coping skills, and encouragement practice.       ASSESSMENT: Current Emotional / Mental Status (status of significant symptoms):   Risk status (Self / Other harm or suicidal ideation)   Patient denies current fears or concerns for personal safety.   Patient denies current or recent suicidal ideation or behaviors.   Patientdenies current or recent homicidal ideation or behaviors.   Patient denies current or recent self injurious behavior or ideation.   Patient denies other safety concerns.   Patient reports there has been no change in risk factors since their last session.     Patient reports there has been no change in protective factors since their last session.     Recommended that patient call 911 or go to the local ED should there be a change in any of these risk factors.     Appearance:   Not Assessed    Eye Contact:   Not Assessed    Psychomotor Behavior: Not Assessed    Attitude:   Cooperative    Orientation:   All   Speech    Rate / Production: Normal/ Responsive    Volume:  Normal    Mood:    Anxious    Affect:    Appropriate    Thought Content:  Clear    Thought Form:  Coherent  Logical    Insight:    Good      Medication Review:   No changes to current psychiatric medication(s)     Medication Compliance:   Yes     Changes in Health Issues:   None reported     Chemical Use Review:   Substance Use: Chemical use reviewed, no active concerns identified       Tobacco Use: No current tobacco use.      Diagnosis:  1. Generalized anxiety disorder    2. Panic disorder without agoraphobia         Collateral Reports Completed:   Not Applicable      PLAN: (Patient Tasks / Therapist Tasks / Other)  HOMEWORK:  New:   Begin tracking anxiety levels and triggers to identify patterns.      Eunice Mixon MA, [facility]                                                          ______________________________________________________________________    Treatment Plan    Patient's Name: Tracee Cooper  YOB: 1970    Date: 5/5/21    Diagnoses:  300.02 (F41.1) Generalized Anxiety Disorder   (f41.0) Panic disorder without agoraphobia  Rule out Posttraumatic Stress Disorder  Psychosocial & Contextual Factors: Past trauma, children's mental health concerns, work stressors  WHODAS: Completed    Referral / Collaboration:  Referral to another professional/service is not indicated at this time.    Anticipated number of session or this episode of care: 20      MeasurableTreatment Goal(s) related to diagnosis / functional impairment(s)  Goal 1: Patient will decrease anxiety levels as evidence by ORVILLE-7 scores, and client report.    I will know I've met my goal when I have less panic attacks, when I can drive to a new place without feeling anxious or panicked.      Objective #A (Patient Action)    Patient will identify 3 initial signs or symptoms of anxiety.  Status: Continued - Date(s): 12/16/19, 11/2/20, 2/4/21, 5/17/21    Intervention(s)  Therapist will assign homework    teach emotional recognition/identification.      Objective #B  Patient will use cognitive strategies identified in therapy to challenge anxious thoughts.  Status: Completed - Date: 8/25/21     Intervention(s)  Therapist will assign homework    teach CBT techniques to identify and challenge cognitive distortions.    Objective #C  Patient will identify three distraction and diversion activities and use those  activities to decrease level of anxiety  .  Status: Completed - Date: 8/25/21     Intervention(s)  Therapist will assign homework    teach DBT techniques including Mindfulness and Distress Tolerance.     Goal 2: Patient will increase sense of confidence and independence per client report.      Objective #A (Patient Action)    Patient will identify 3 ways in which she would like to increase sense of independence.  Status: New: 8/25/21    Intervention(s)  Therapist will assign homework    teach emotional recognition/identification, teach values identification.      Objective #B  Patient will use cognitive strategies identified in therapy to identify and challenge negative self-talk / self-judgement.   Status: New: 8/25/21    Intervention(s)  Therapist will assign homework    teach CBT techniques to identify and challenge cognitive distortions, teach positive affirmations    Objective #C  Patient will engage in daily practice of identifying a positive affirmation or personal success she feels proud of.  Status: New: 8/25/21    Intervention(s)  Therapist will assign homework    teach CBT techniques        Patient has reviewed and agreed to the above plan.      Eunice Mixon MA, [facility]  9/20/21

## 2021-09-30 ENCOUNTER — MYC REFILL (OUTPATIENT)
Dept: FAMILY MEDICINE | Facility: CLINIC | Age: 51
End: 2021-09-30

## 2021-09-30 DIAGNOSIS — F41.1 GENERALIZED ANXIETY DISORDER: ICD-10-CM

## 2021-10-01 RX ORDER — ALPRAZOLAM 1 MG
TABLET ORAL
Qty: 20 TABLET | Refills: 0 | Status: SHIPPED | OUTPATIENT
Start: 2021-10-01 | End: 2021-12-09

## 2021-10-07 ENCOUNTER — MYC MEDICAL ADVICE (OUTPATIENT)
Dept: FAMILY MEDICINE | Facility: CLINIC | Age: 51
End: 2021-10-07

## 2021-10-11 ENCOUNTER — VIRTUAL VISIT (OUTPATIENT)
Dept: PSYCHOLOGY | Facility: CLINIC | Age: 51
End: 2021-10-11
Payer: COMMERCIAL

## 2021-10-11 DIAGNOSIS — F41.1 GENERALIZED ANXIETY DISORDER: Primary | ICD-10-CM

## 2021-10-11 PROCEDURE — 90834 PSYTX W PT 45 MINUTES: CPT | Mod: 95 | Performed by: COUNSELOR

## 2021-10-11 NOTE — PROGRESS NOTES
"                                           Progress Note    Patient Name: Tracee Cooper  Date: 10/11/21         Service Type: Individual      Session Start Time: 9 am  Session End Time: 9:45     Session Length: 45    Session #: 30    Attendees: Client attended alone     Service Modality:  Phone Visit:     The patient has been notified of the following:      \"We have found that certain health care needs can be provided without the need for a face to face visit.  This service lets us provide the care you need with a phone conversation.       I will have full access to your [place] medical record during this entire phone call.   I will be taking notes for your medical record.      Since this is like an office visit, we will bill your insurance company for this service.       There are potential benefits and risks of telephone visits (e.g. limits to patient confidentiality) that differ from in-person visits.?  Confidentiality still applies for telephone services, and nobody will record the visit.  It is important to be in a quiet, private space that is free of distractions (including cell phone or other devices) during the visit.??      If during the course of the call I believe a telephone visit is not appropriate, you will not be charged for this service\"     Consent has been obtained for this service by care team member: Yes     Treatment Plan Last Reviewed: 8/25/21  PHQ-9 / ORVILLE-7 :         DATA  Interactive Complexity: No  Crisis: No       Progress Since Last Session (Related to Symptoms / Goals / Homework):   Symptoms: No change       Homework: Partially completed        Episode of Care Goals: Satisfactory progress - ACTION (Actively working towards change); Intervened by reinforcing change plan / affirming steps taken     Current / Ongoing Stressors and Concerns:   Ongoing: Anxiety and panic attacks while driving, history of family conflict  Current:   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 Expresses some ongoing feelings of overwhelm around work tasks.     Treatment Objective(s) Addressed in This Session:    Patient will use cognitive strategies identified in therapy to identify and challenge negative self-talk / self-judgement.   Patient will use cognitive strategies identified in therapy to challenge anxious thoughts.       Intervention:   CBT:   Reinforced effective skills practice. Prompted use of checking the facts to respond to anxiety thoughts. Explored additional coping skills, and encouragement practice.        ASSESSMENT: Current Emotional / Mental Status (status of significant symptoms):   Risk status (Self / Other harm or suicidal ideation)   Patient denies current fears or concerns for personal safety.   Patient denies current or recent suicidal ideation or behaviors.   Patientdenies current or recent homicidal ideation or behaviors.   Patient denies current or recent self injurious behavior or ideation.   Patient denies other safety concerns.   Patient reports there has been no change in risk factors since their last session.     Patient reports there has been no change in protective factors since their last session.     Recommended that patient call 911 or go to the local ED should there be a change in any of these risk factors.     Appearance:   Not Assessed    Eye Contact:   Not Assessed    Psychomotor Behavior: Not Assessed    Attitude:   Cooperative    Orientation:   All   Speech    Rate / Production: Normal/ Responsive    Volume:  Normal    Mood:    Anxious    Affect:    Appropriate    Thought Content:  Clear    Thought Form:  Coherent  Logical    Insight:    Good      Medication Review:   No changes to current psychiatric medication(s)     Medication Compliance:   Yes     Changes in Health Issues:   None reported     Chemical Use Review:   Substance Use: Chemical use reviewed, no active concerns identified      Tobacco Use: No current  tobacco use.      Diagnosis:  1. Generalized anxiety disorder         Collateral Reports Completed:   Not Applicable      PLAN: (Patient Tasks / Therapist Tasks / Other)  HOMEWORK:  New:   Begin tracking anxiety levels and triggers to identify patterns.       Eunice Mixon MA, [facility]                                                          ______________________________________________________________________    Treatment Plan    Patient's Name: Tracee Cooper  YOB: 1970    Date: 5/5/21    Diagnoses:  300.02 (F41.1) Generalized Anxiety Disorder   (f41.0) Panic disorder without agoraphobia  Rule out Posttraumatic Stress Disorder  Psychosocial & Contextual Factors: Past trauma, children's mental health concerns, work stressors  WHODAS: Completed    Referral / Collaboration:  Referral to another professional/service is not indicated at this time.    Anticipated number of session or this episode of care: 20      MeasurableTreatment Goal(s) related to diagnosis / functional impairment(s)  Goal 1: Patient will decrease anxiety levels as evidence by ORVILLE-7 scores, and client report.    I will know I've met my goal when I have less panic attacks, when I can drive to a new place without feeling anxious or panicked.      Objective #A (Patient Action)    Patient will identify 3 initial signs or symptoms of anxiety.  Status: Continued - Date(s): 12/16/19, 11/2/20, 2/4/21, 5/17/21    Intervention(s)  Therapist will assign homework    teach emotional recognition/identification.      Objective #B  Patient will use cognitive strategies identified in therapy to challenge anxious thoughts.  Status: Completed - Date: 8/25/21     Intervention(s)  Therapist will assign homework    teach CBT techniques to identify and challenge cognitive distortions.    Objective #C  Patient will identify three distraction and diversion activities and use those activities to decrease level of anxiety  .  Status: Completed - Date: 8/25/21      Intervention(s)  Therapist will assign homework    teach DBT techniques including Mindfulness and Distress Tolerance.     Goal 2: Patient will increase sense of confidence and independence per client report.      Objective #A (Patient Action)    Patient will identify 3 ways in which she would like to increase sense of independence.  Status: New: 8/25/21    Intervention(s)  Therapist will assign homework    teach emotional recognition/identification, teach values identification.      Objective #B  Patient will use cognitive strategies identified in therapy to identify and challenge negative self-talk / self-judgement.   Status: New: 8/25/21    Intervention(s)  Therapist will assign homework    teach CBT techniques to identify and challenge cognitive distortions, teach positive affirmations    Objective #C  Patient will engage in daily practice of identifying a positive affirmation or personal success she feels proud of.  Status: New: 8/25/21    Intervention(s)  Therapist will assign homework    teach CBT techniques        Patient has reviewed and agreed to the above plan.      Eunice Mixon MA, [facility]  10/11/21

## 2021-11-03 ENCOUNTER — VIRTUAL VISIT (OUTPATIENT)
Dept: PSYCHOLOGY | Facility: CLINIC | Age: 51
End: 2021-11-03
Payer: COMMERCIAL

## 2021-11-03 DIAGNOSIS — F41.1 GENERALIZED ANXIETY DISORDER: Primary | ICD-10-CM

## 2021-11-03 DIAGNOSIS — F41.0 PANIC DISORDER WITHOUT AGORAPHOBIA: ICD-10-CM

## 2021-11-03 PROCEDURE — 90834 PSYTX W PT 45 MINUTES: CPT | Mod: 95 | Performed by: COUNSELOR

## 2021-11-03 NOTE — PROGRESS NOTES
"                                           Progress Note    Patient Name: Tracee Cooper  Date: 11/3/21         Service Type: Individual      Session Start Time: 11 am  Session End Time: 11:45     Session Length: 45    Session #: 31    Attendees: Client attended alone     Service Modality:  Phone Visit:     The patient has been notified of the following:      \"We have found that certain health care needs can be provided without the need for a face to face visit.  This service lets us provide the care you need with a phone conversation.       I will have full access to your [place] medical record during this entire phone call.   I will be taking notes for your medical record.      Since this is like an office visit, we will bill your insurance company for this service.       There are potential benefits and risks of telephone visits (e.g. limits to patient confidentiality) that differ from in-person visits.?  Confidentiality still applies for telephone services, and nobody will record the visit.  It is important to be in a quiet, private space that is free of distractions (including cell phone or other devices) during the visit.??      If during the course of the call I believe a telephone visit is not appropriate, you will not be charged for this service\"     Consent has been obtained for this service by care team member: Yes     Treatment Plan Last Reviewed: 8/25/21  PHQ-9 / ORVILLE-7 :         DATA  Interactive Complexity: No  Crisis: No       Progress Since Last Session (Related to Symptoms / Goals / Homework):   Symptoms: No change       Homework: Partially completed        Episode of Care Goals: Satisfactory progress - ACTION (Actively working towards change); Intervened by reinforcing change plan / affirming steps taken     Current / Ongoing Stressors and Concerns:   Ongoing: Anxiety and panic attacks while driving, history of family conflict  Current:   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 Expresses some anxiety around well being of her children and how that prompts unwarranted guilt.      Treatment Objective(s) Addressed in This Session:    Patient will use cognitive strategies identified in therapy to identify and challenge negative self-talk / self-judgement.   Patient will use cognitive strategies identified in therapy to challenge anxious thoughts.       Intervention:   CBT:   Reinforced effective skills practice. Prompted use of checking the facts to respond to anxiety thoughts. Reflected and challenged distorted thinking patterns.       ASSESSMENT: Current Emotional / Mental Status (status of significant symptoms):   Risk status (Self / Other harm or suicidal ideation)   Patient denies current fears or concerns for personal safety.   Patient denies current or recent suicidal ideation or behaviors.   Patientdenies current or recent homicidal ideation or behaviors.   Patient denies current or recent self injurious behavior or ideation.   Patient denies other safety concerns.   Patient reports there has been no change in risk factors since their last session.     Patient reports there has been no change in protective factors since their last session.     Recommended that patient call 911 or go to the local ED should there be a change in any of these risk factors.     Appearance:   Not Assessed    Eye Contact:   Not Assessed    Psychomotor Behavior: Not Assessed    Attitude:   Cooperative    Orientation:   All   Speech    Rate / Production: Normal/ Responsive    Volume:  Normal    Mood:    Anxious    Affect:    Appropriate    Thought Content:  Clear    Thought Form:  Coherent  Logical    Insight:    Good      Medication Review:   No changes to current psychiatric medication(s)     Medication Compliance:   Yes     Changes in Health Issues:   None reported     Chemical Use Review:   Substance Use: Chemical use reviewed, no active concerns identified       Tobacco Use: No current tobacco use.      Diagnosis:  1. Generalized anxiety disorder    2. Panic disorder without agoraphobia         Collateral Reports Completed:   Not Applicable      PLAN: (Patient Tasks / Therapist Tasks / Other)  HOMEWORK:    Continue tracking anxiety levels and triggers to identify patterns.   Review emotions brought on by school conferences.      Eunice Mixon MA, LPCC                                                          ______________________________________________________________________    Treatment Plan    Patient's Name: Tracee Cooper  YOB: 1970    Date: 5/5/21    Diagnoses:  300.02 (F41.1) Generalized Anxiety Disorder   (f41.0) Panic disorder without agoraphobia  Rule out Posttraumatic Stress Disorder  Psychosocial & Contextual Factors: Past trauma, children's mental health concerns, work stressors  WHODAS: Completed    Referral / Collaboration:  Referral to another professional/service is not indicated at this time.    Anticipated number of session or this episode of care: 20      MeasurableTreatment Goal(s) related to diagnosis / functional impairment(s)  Goal 1: Patient will decrease anxiety levels as evidence by ORVILLE-7 scores, and client report.    I will know I've met my goal when I have less panic attacks, when I can drive to a new place without feeling anxious or panicked.      Objective #A (Patient Action)    Patient will identify 3 initial signs or symptoms of anxiety.  Status: Continued - Date(s): 12/16/19, 11/2/20, 2/4/21, 5/17/21    Intervention(s)  Therapist will assign homework    teach emotional recognition/identification.      Objective #B  Patient will use cognitive strategies identified in therapy to challenge anxious thoughts.  Status: Completed - Date: 8/25/21     Intervention(s)  Therapist will assign homework    teach CBT techniques to identify and challenge cognitive distortions.    Objective #C  Patient will identify three  distraction and diversion activities and use those activities to decrease level of anxiety  .  Status: Completed - Date: 8/25/21     Intervention(s)  Therapist will assign homework    teach DBT techniques including Mindfulness and Distress Tolerance.     Goal 2: Patient will increase sense of confidence and independence per client report.      Objective #A (Patient Action)    Patient will identify 3 ways in which she would like to increase sense of independence.  Status: New: 8/25/21    Intervention(s)  Therapist will assign homework    teach emotional recognition/identification, teach values identification.      Objective #B  Patient will use cognitive strategies identified in therapy to identify and challenge negative self-talk / self-judgement.   Status: New: 8/25/21    Intervention(s)  Therapist will assign homework    teach CBT techniques to identify and challenge cognitive distortions, teach positive affirmations    Objective #C  Patient will engage in daily practice of identifying a positive affirmation or personal success she feels proud of.  Status: New: 8/25/21    Intervention(s)  Therapist will assign homework    teach CBT techniques        Patient has reviewed and agreed to the above plan.      Eunice Mixon MA, [facility]  11/3/21

## 2021-11-15 ENCOUNTER — MYC MEDICAL ADVICE (OUTPATIENT)
Dept: FAMILY MEDICINE | Facility: CLINIC | Age: 51
End: 2021-11-15
Payer: COMMERCIAL

## 2021-11-15 DIAGNOSIS — M54.12 CERVICAL RADICULOPATHY: Primary | ICD-10-CM

## 2021-11-16 ENCOUNTER — TELEPHONE (OUTPATIENT)
Dept: PALLIATIVE MEDICINE | Facility: CLINIC | Age: 51
End: 2021-11-16

## 2021-11-16 NOTE — TELEPHONE ENCOUNTER
She has had cervical epidural steroid injection x2. Please ask if she has had benefit from prior ESIs.     Yary Downs MD  [facility]

## 2021-11-16 NOTE — TELEPHONE ENCOUNTER
Called Jennie asked if any relief from PAT's X 2 previously, she replied,    Yes, I did have relief, about 40% relief   2nd one about 50% relief  She reports they did help and would like a repeat     Jennie would prefer [place] location she states, if possible    Carin Rivera RN, BSN, CMSRN  RN Care Coordinator  [facility]

## 2021-11-16 NOTE — TELEPHONE ENCOUNTER
Order received for Injection to be Determined by Pain Management Specialist.    Routing to review.    Pao ALFONSO    [facility]

## 2021-11-17 ENCOUNTER — VIRTUAL VISIT (OUTPATIENT)
Dept: PSYCHOLOGY | Facility: CLINIC | Age: 51
End: 2021-11-17
Payer: COMMERCIAL

## 2021-11-17 DIAGNOSIS — F41.1 GENERALIZED ANXIETY DISORDER: Primary | ICD-10-CM

## 2021-11-17 PROCEDURE — 90834 PSYTX W PT 45 MINUTES: CPT | Mod: 95 | Performed by: COUNSELOR

## 2021-11-17 NOTE — TELEPHONE ENCOUNTER
Repeat C7/T1 interlaminar epidural steroid injection     Yary Downs MD  [facility]

## 2021-11-17 NOTE — PROGRESS NOTES
"                                           Progress Note    Patient Name: Tracee Cooper  Date: 11/17/21         Service Type: Individual      Session Start Time: 10 am  Session End Time: 10:45     Session Length: 45    Session #: 32     Attendees: Client attended alone     Service Modality:  Phone Visit:     The patient has been notified of the following:      \"We have found that certain health care needs can be provided without the need for a face to face visit.  This service lets us provide the care you need with a phone conversation.       I will have full access to your [place] medical record during this entire phone call.   I will be taking notes for your medical record.      Since this is like an office visit, we will bill your insurance company for this service.       There are potential benefits and risks of telephone visits (e.g. limits to patient confidentiality) that differ from in-person visits.?  Confidentiality still applies for telephone services, and nobody will record the visit.  It is important to be in a quiet, private space that is free of distractions (including cell phone or other devices) during the visit.??      If during the course of the call I believe a telephone visit is not appropriate, you will not be charged for this service\"     Consent has been obtained for this service by care team member: Yes     Treatment Plan Last Reviewed: 11/17/21  PHQ-9 / ORVILLE-7 :         DATA  Interactive Complexity: No  Crisis: No       Progress Since Last Session (Related to Symptoms / Goals / Homework):   Symptoms: No change       Homework: Partially completed        Episode of Care Goals: Satisfactory progress - ACTION (Actively working towards change); Intervened by reinforcing change plan / affirming steps taken     Current / Ongoing Stressors and Concerns:   Ongoing: Anxiety and panic attacks while driving, history of family conflict  Current:   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      Treatment Objective(s) Addressed in This Session:    Patient will use cognitive strategies identified in therapy to identify and challenge negative self-talk / self-judgement.   Patient will use cognitive strategies identified in therapy to challenge anxious thoughts.    Patient will engage in daily practice of identifying a positive affirmation or personal success she feels proud of.     Intervention:   CBT:   Reinforced effective skills practice. Prompted use of checking the facts to respond to anxiety thoughts. Reflected and challenged distorted thinking patterns. Coached on relaxation skills to assist with sleep. Reflected progress since beginning of treatment.       ASSESSMENT: Current Emotional / Mental Status (status of significant symptoms):   Risk status (Self / Other harm or suicidal ideation)   Patient denies current fears or concerns for personal safety.   Patient denies current or recent suicidal ideation or behaviors.   Patientdenies current or recent homicidal ideation or behaviors.   Patient denies current or recent self injurious behavior or ideation.   Patient denies other safety concerns.   Patient reports there has been no change in risk factors since their last session.     Patient reports there has been no change in protective factors since their last session.     Recommended that patient call 911 or go to the local ED should there be a change in any of these risk factors.     Appearance:   Not Assessed    Eye Contact:   Not Assessed    Psychomotor Behavior: Not Assessed    Attitude:   Cooperative    Orientation:   All   Speech    Rate / Production: Normal/ Responsive    Volume:  Normal    Mood:    Anxious    Affect:    Appropriate    Thought Content:  Clear    Thought Form:  Coherent  Logical    Insight:    Good      Medication Review:   No changes to current psychiatric medication(s)     Medication Compliance:   Yes     Changes in Health Issues:   None  reported     Chemical Use Review:   Substance Use: Chemical use reviewed, no active concerns identified      Tobacco Use: No current tobacco use.      Diagnosis:  1. Generalized anxiety disorder         Collateral Reports Completed:   Not Applicable      PLAN: (Patient Tasks / Therapist Tasks / Other)  HOMEWORK:  Cognitive distortion challenge and relaxation skills as addressed in session.      Eunice Mixon MA, [facility]                                                          ______________________________________________________________________    Treatment Plan    Patient's Name: Tracee Cooper  YOB: 1970    Date: 11/17/21    Diagnoses:  300.02 (F41.1) Generalized Anxiety Disorder   (f41.0) Panic disorder without agoraphobia  Rule out Posttraumatic Stress Disorder  Psychosocial & Contextual Factors: Past trauma, children's mental health concerns, work stressors  WHODAS: Completed    Referral / Collaboration:  Referral to another professional/service is not indicated at this time.    Anticipated number of session or this episode of care: 20      MeasurableTreatment Goal(s) related to diagnosis / functional impairment(s)  Goal 1: Patient will decrease anxiety levels as evidence by ORVILLE-7 scores, and client report.    I will know I've met my goal when I have less panic attacks, when I can drive to a new place without feeling anxious or panicked.      Objective #A (Patient Action)    Patient will identify 3 initial signs or symptoms of anxiety.  Status: Continued - Date(s): 12/16/19, 11/2/20, 2/4/21, 5/17/21, 11/17/21    Intervention(s)  Therapist will assign homework    teach emotional recognition/identification.      Objective #B  Patient will use cognitive strategies identified in therapy to challenge anxious thoughts.  Status: Completed - Date: 8/25/21     Intervention(s)  Therapist will assign homework    teach CBT techniques to identify and challenge cognitive distortions.    Objective #C  Patient  will identify three distraction and diversion activities and use those activities to decrease level of anxiety  .  Status: Completed - Date: 8/25/21     Intervention(s)  Therapist will assign homework    teach DBT techniques including Mindfulness and Distress Tolerance.     Goal 2: Patient will increase sense of confidence and independence per client report.      Objective #A (Patient Action)    Patient will identify 3 ways in which she would like to increase sense of independence.  Status: New: 8/25/21, 11/17/21    Intervention(s)  Therapist will assign homework    teach emotional recognition/identification, teach values identification.      Objective #B  Patient will use cognitive strategies identified in therapy to identify and challenge negative self-talk / self-judgement.   Status: New: 8/25/21, 11/17/21    Intervention(s)  Therapist will assign homework    teach CBT techniques to identify and challenge cognitive distortions, teach positive affirmations    Objective #C  Patient will engage in daily practice of identifying a positive affirmation or personal success she feels proud of.  Status: New: 8/25/21, 11/17/21    Intervention(s)  Therapist will assign homework    teach CBT techniques        Patient has reviewed and agreed to the above plan.      Eunice Mixon MA, [facility]  11/17/21

## 2021-11-18 NOTE — TELEPHONE ENCOUNTER
Screening Questions for Radiology Injections:    Injection to be done at which interventional clinic site? [facility] [place]    If [facility] location, tell patient that this procedure requires a COVID-19 lab test be done within 4 days of the procedure. Would you still like to move forward with scheduling the procedure?  Not Applicable   If YES, let patient know that someone will call them to schedule the COVID-19 test and that they will only receive a call back if the result is positive. Route to nursing to enter order.     Instruct patient to arrive as directed prior to the scheduled appointment time:    [place] minutes before      [place]: 30 minutes before; if IV needed 1 hour before     Procedure ordered by Nghia    Procedure ordered? Repeat C7/T1 interlaminar epidural steroid injection      Transforaminal Cervical PAT -  [facility] does NOT have providers that do these- [facility] and [facility] do have providers that do    As a reminder, receiving steroids can decrease your body's ability to fight infection.   Would you still like to move forward with scheduling the injection?  Yes    What insurance would patient like us to bill for this procedure? BCBS      Worker's comp or MVA (motor vehicle accident) -Any injection DO NOT SCHEDULE and route to Jessenia Pearl.      [facility] insurance - For SI joint injections, DO NOT SCHEDULE and route Jessenia Pearl.       ALL BCBS, [employer] and [employer]-Route to Jessenia for review DO NOT SCHEDULE      IF SCHEDULING IN [place] AND NEEDS A PA, IT IS OKAY TO SCHEDULE. [place] HANDLES THEIR OWN PA'S AFTER THE PATIENT IS SCHEDULED. PLEASE SCHEDULE AT LEAST 1 WEEK OUT SO A PA CAN BE OBTAINED.    Any chance of pregnancy? NO   If YES, do NOT schedule and route to RN pool    Is an  needed? No     Patient has a drive home? (mandatory) YES: INFORMED    Is patient taking any blood thinners (That is: Coumadin, Warfarin, Jantoven, Pradaxa  Xarelto, Eliquis, Edoxaban, Enoxaparin, Lovenox, Heparin, Arixtra, Fondaparinux, or Fragmin? OR Antiplatelet medication such as Plavix, Brilinta, or Effient? )? No   If hold needed, do NOT schedule, route to RN pool     Is patient taking any aspirin products (includes Excedrin and Fiorinal)? No     If more than 325mg/day, OK to schedule; Instruct pt to decrease to less than 325 mg for 7 days AND route to RN pool    For CERVICAL procedures, hold all aspirin products for 6 days.     Tell pt that if aspirin product is not held for 6 days, the procedure WILL BE cancelled.      Does the patient have a bleeding or clotting disorder? No     If YES, okay to schedule AND route to RN nurse pool    For any patients with platelet count <100, must be forwarded to provider    Any allergies to contrast dye, iodine, shellfish, or numbing and steroid medications? No    If YES, add allergy information to appointment notes AND route to the RN pool     If PAT and Contrast Dye / Iodine Allergy? DO NOT SCHEDULE, route to RN pool    Allergies: Patient has no known allergies.     Is patient diabetic?  No  If YES, instruct them to bring their glucometer.    Does patient have an active infection or treated for one within the past week? No     Is patient currently taking any antibiotics?  No     For patients on chronic, preventative, or prophylactic antibiotics, procedures may be scheduled.     For patients on antibiotics for active or recent infection:antibiotic course must have been completed for 4 days    Is patient currently taking any steroid medications? (i.e. Prednisone, Medrol)  No     For patients on steroid medications, course must have been completed for 4 days    Is patient actively being treated for cancer or immunocompromised? No  If YES, do NOT schedule and route to RN pool     Are you able to get on and off an exam table with minimal or no assistance? Yes  If NO, do NOT schedule and route to RN pool    Are you able to roll  over and lay on your stomach with minimal or no assistance? Yes  If NO, do NOT schedule and route to RN pool     Has the patient had a flu shot or any other vaccinations within 7 days before or after the procedure.  Yes - 11/18     Have you recently had a COVID vaccine or have plans to get it in the near future? No    If yes, explain that for the vaccine to work best they need to:       wait 1 week before and 1 week after getting Vaccine #1    wait 1 week before and 2 weeks after getting Vaccine #2    wait 1 week before and 2 weeks after getting Vaccine BOOSTER    If patient has concerns about the timing, send to RN pool     Does patient have an MRI/CT?  YES: 2019  Check Procedure Scheduling Grid to see if required.      Was the MRI done within the last 3 years?  Yes    If yes, where was the MRI done i.e.[facility], [facility], [place], [facility] etc? [facility]      If no, do not schedule and route to RN pool    If MRI was not done at [place], [facility] or [facility] do NOT schedule and route to RN pool.      If pt has an imaging disc, the injection MAY be scheduled but pt has to bring disc to appt.     If they show up without the disc the injection cannot be done    Procedure Specific Instructions:      If celiac plexus block, informed patient NPO for 6 hours and that it is okay to take medications with sips of water, especially blood pressure medications  Not Applicable         If this is for a cervical procedure, informed patient that aspirin needs to be held for 6 days.   Not Applicable      If IV needed:    Do not schedule procedures requiring IV placement in the first appointment of the day or first appointment after lunch. Do NOT schedule at 0745, 0815 or 1245.     Instructed pt to arrive 30 minutes early for IV start if required. (Check Procedure Scheduling Grid)  YES:     Reminders:      If you are started on any steroids or antibiotics between now and your appointment, you must contact us because the  procedure may need to be cancelled.  Yes      For all procedures except radiofrequency ablations (RFAs) and spinal cord stimulator (SCS) trials, informed patient:    IV sedation is not provided for this procedure.  If you feel that an oral anti-anxiety medication is needed, you can discuss this further with your referring provider or primary care provider.  The Pain Clinic provider will discuss specifics of what the procedure includes at your appointment.  Most procedures last 10-20 minutes.  We use numbing medications to help with any discomfort during the procedure.  Not Applicable      For patients 85 or older we recommend having an adult stay w/ them for the remainder of the day.       Does the patient have any questions?  NO  Pao Martines  [facility]

## 2021-11-19 NOTE — TELEPHONE ENCOUNTER
No PA required, okay to schedule      Jessenia LEE    [facility]

## 2021-11-19 NOTE — TELEPHONE ENCOUNTER
LVM to schedule Repeat C7/T1 interlaminar epidural steroid injection        Morris FERRELL    [facility] [place]

## 2021-11-30 ENCOUNTER — RADIOLOGY INJECTION OFFICE VISIT (OUTPATIENT)
Dept: PALLIATIVE MEDICINE | Facility: CLINIC | Age: 51
End: 2021-11-30
Payer: COMMERCIAL

## 2021-11-30 VITALS — HEART RATE: 89 BPM | DIASTOLIC BLOOD PRESSURE: 99 MMHG | SYSTOLIC BLOOD PRESSURE: 151 MMHG | OXYGEN SATURATION: 95 %

## 2021-11-30 DIAGNOSIS — M54.12 CERVICAL RADICULOPATHY: ICD-10-CM

## 2021-11-30 PROCEDURE — 62321 NJX INTERLAMINAR CRV/THRC: CPT | Performed by: PAIN MEDICINE

## 2021-11-30 RX ORDER — DEXAMETHASONE SODIUM PHOSPHATE 10 MG/ML
10 INJECTION INTRAMUSCULAR; INTRAVENOUS ONCE
Status: COMPLETED | OUTPATIENT
Start: 2021-11-30 | End: 2021-11-30

## 2021-11-30 RX ADMIN — DEXAMETHASONE SODIUM PHOSPHATE 10 MG: 10 INJECTION INTRAMUSCULAR; INTRAVENOUS at 12:16

## 2021-11-30 ASSESSMENT — PAIN SCALES - GENERAL: PAINLEVEL: MODERATE PAIN (5)

## 2021-11-30 NOTE — NURSING NOTE
Pre-procedure Intake  If YES to any questions or NO to having a   Please complete laminated checklist and leave on the computer keyboard for Provider, verbally inform provider if able.    For SCS Trial, RFA's or any sedation procedure:  Have you been fasting? NA    If yes, for how long? NA    Are you taking any any blood thinners such as Coumadin, Warfarin, Jantoven, Pradaxa Xarelto, Eliquis, Edoxaban, Enoxaparin, Lovenox, Heparin, Arixtra, Fondaparinux, or Fragmin? OR Antiplatelet medication such as Plavix, Brilinta, or Effient?   No     If yes, when did you take your last dose? NA    Do you take aspirin?  No    If cervical procedure, have you held aspirin for 6 days?   NA    Do you have any allergies to contrast dye, iodine, steroid and/or numbing medications?  NO    Are you currently taking antibiotics or have an active infection?  NO    Have you had a fever/elevated temperature within the past week? NO    Are you currently taking oral steroids? NO    Do you have a ? Yes    Are you pregnant or breastfeeding?  NO    Have you received the COVID-19 vaccine? Yes    If yes, was it your 1st, 2nd or only dose needed? 1st Uziel and Uziel    Date of most recent vaccine: June 2021    Notify provider and RNs if systolic BP >170, diastolic BP >100, P >100 or O2 sats < 90%      Sejal Flores CMA (AAMA)

## 2021-11-30 NOTE — PROGRESS NOTES
[facility] - Procedure Note    Date of Visit: 9/17/2019    Procedure performed: C7-T1 interlaminar epidural steroid injection with fluoroscopic guidance  Diagnosis: Cervical spondylosis; Cervical radiculitis/radiculopathy  : Payam Galo MD   Anesthesia: none    Indications: Tracee Cooper is a 49 year old female who is seen  for cervical epidural steroid injection. The patient describes bilateral neck pain right greater than left. The patient has been exhibiting symptoms consistent with cervical intraspinal inflammation and radiculopathy. Symptoms have been persistent, disabling, and intermittently severe. The patient reports minimal improvement with conservative treatment, including meds/PT/prior injections with benefit.    Cervical MRI Level by level:     C2-C3: There is no spinal canal or neural foraminal stenosis at this  level.     C3-C4: There is facet arthropathy and uncinate arthropathy  bilaterally. There is mild left foraminal narrowing but no spinal  canal or right foraminal stenosis.     C4-C5: There is facet arthropathy and uncinate arthropathy bilaterally  resulting in mild left foraminal narrowing. There is no right  foraminal or spinal canal narrowing.     C5-C6: There is facet arthropathy bilaterally, uncinate hypertrophy  bilaterally and a posterior broad-based disc-osteophyte complex with a  superimposed small posterior broad-based disc herniation (protrusion).  These findings result in moderate spinal canal stenosis with mild  flattening of the left anterior surface of the cervical spinal cord,  moderate left foraminal stenosis and mild right foraminal narrowing.     C6-C7: There is facet arthropathy bilaterally, uncinate hypertrophy  bilaterally and a posterior broad-based disc-osteophyte complex with a  superimposed small posterior broad-based disc herniation (protrusion).  These findings result in mild spinal canal narrowing and mild left  foraminal  narrowing but no right foraminal stenosis.     C7-T1: There is no spinal canal or neural foraminal stenosis at this  level.                                                                      IMPRESSION: Degenerative changes of the cervical spine as described  above.Allergies:    No Known Allergies     Vitals:  There were no vitals taken for this visit.    Review of Systems: The patient denies recent fever, chills, illness, use of antibiotics or anticoagulants. All other 10-point review of systems negative.     Procedure: The procedure and risks were explained, and informed written consent was obtained from the patient. Risks include but are not limited to: infection, bleeding, increased pain, and damage to soft tissue, nerve, muscle, and vasculature structures. After getting informed consent, patient was brought into the procedure suite and was placed in a prone position on the procedure table. A Pause for the Cause was performed. Patient was prepped and draped in sterile fashion.     The C7-T1 interspace was identified with use of fluoroscopy in AP view. A 25-gauge, 1.5 inch needle was used to anesthetize the skin and subcutaneous tissue entry site with a total of 2 ml of 1% lidocaine. Under fluoroscopic visualization, a 22-gauge, 3.5 inch Tuohy epidural needle was slowly advanced towards the epidural space a few millimeters right of midline. The latter part of the needle advancement was guided with fluoroscopy in the lateral view. The epidural space was identified using loss of resistance technique. After negative aspiration for heme and cerebrospinal fluid, a total of 1 mL of Omnipaque was injected to confirm needle placement. 9 mL of contrast was wasted. Epidurogram confirmed spread within the posterior epidural space. 2 ml of 10mg/ml of dexamethasone and 1 ml of preservative free 0.25% bupivacaine was injected. The needle was removed.  Images were saved to PACS.    The patient tolerated the procedure well,  and there was no evidence of procedural complications. No new sensory or motor deficits were noted following the procedure. The patient was stable and able to ambulate on discharge home. Post-procedure instructions were provided.     Pre-procedure pain score: 5/10 in the neck, 5/10 in the arm  Post-procedure pain score: 1/10 in the neck, 1/10 in the arm    Assessment/Plan: Tracee Cooper is a 49 year old female s/p cervical interlaminar epidural steroid injection today for cervical spondylosis and radiculitis/radiculopathy.     1. Following today's procedure, the patient was advised to contact the [facility] for any of the following:   Fever, chills, or night sweats   New onset of pain, numbness, or weakness   Any questions/concerns regarding the procedure  If unable to contact the Pain Center, the patient was instructed to go to a local Emergency Room for any complications.   2. The patient will receive a follow-up call in 1 week.   3. Follow-up with referring provider in 2 weeks for post-procedure evaluation.    TIME SPENT:   GHASSAN Hendrickson

## 2021-11-30 NOTE — PATIENT INSTRUCTIONS
[facility]   Procedure Discharge Instructions    Today you saw:     Dr. Payam Galo      You had an:  Epidural steroid injection   -cervical     Medications used:  Lidocaine   Bupivacaine   Dexamethasone Omnipaque   Normal saline          Be cautious as numbness and/or weakness in the upper extremities may occur for up to 6-8 hours after the procedure due to effect of the local anesthetic    Do not drive for 6 hours. The effect of the local anesthetic could slow your reflexes.     You may resume your regular activities after 24 hours    Avoid strenuous activity for the first 24 hours    You may shower, however avoid swimming, tub baths or hot tubs for 24 hours following your procedure    You may have a mild to moderate increase in pain for several days following the injection.    It may take up to 14 days for the steroid medication to start working although you may feel the effect as early as a few days after the procedure.       You may use ice packs for 10-15 minutes, 3 to 4 times a day at the injection site for comfort    Do not use heat to painful areas for 6 to 8 hours. This will give the local anesthetic time to wear off and prevent you from accidentally burning your skin.     Unless you have been directed to avoid the use of anti-inflammatory medications (NSAIDS), you may use medications such as ibuprofen, Aleve or Tylenol for pain control if needed.     If you were fasting, you may resume your normal diet and medications after the procedure    If you have diabetes, check your blood sugar more frequently than usual as your blood sugar may be higher than normal for 10-14 days following a steroid injection. Contact your doctor who manages your diabetes if your blood sugar is higher than usual    Possible side effects of steroids that you may experience include flushing, elevated blood pressure, increased appetite, mild headaches and restlessness.  All of these symptoms will  get better with time.    If you experience any of the following, call the Pain Clinic during work hours (Mon-Friday 8-4:30 pm) at [phone] or the Provider Line after hours at [phone]:  -Fever over 100 degree F  -Swelling, bleeding, redness, drainage, warmth at the injection site  -Progressive weakness or numbness in your legs or arms  -Loss of bowel or bladder function  -Unusual headache that is not relieved by Tylenol or other pain reliever  -Unusual new onset of pain that is not improving

## 2021-11-30 NOTE — NURSING NOTE
Discharge Information    IV Discontiued Time:  NA    Amount of Fluid Infused:  NA    Discharge Criteria = When patient returns to baseline or as per MD order    Consciousness:  Pt is fully awake    Circulation:  BP +/- 20% of pre-procedure level    Respiration:  Patient is able to breathe deeply    O2 Sat:  Patient is able to maintain O2 Sat >92% on room air    Activity:  Moves 4 extremities on command    Ambulation:  Patient is able to stand and walk or stand and pivot into wheelchair    Dressing:  Clean/dry or No Dressing    Notes:   Discharge instructions and AVS given to patient    Patient meets criteria for discharge?  YES    Admitted to PCU?  No    Responsible adult present to accompany patient home?  Yes    Signature/Title:    rashel wolf RN  RN Care Coordinator  [facility]

## 2021-12-09 ENCOUNTER — VIRTUAL VISIT (OUTPATIENT)
Dept: PSYCHOLOGY | Facility: CLINIC | Age: 51
End: 2021-12-09
Payer: COMMERCIAL

## 2021-12-09 ENCOUNTER — MYC REFILL (OUTPATIENT)
Dept: FAMILY MEDICINE | Facility: CLINIC | Age: 51
End: 2021-12-09
Payer: COMMERCIAL

## 2021-12-09 DIAGNOSIS — F41.1 GENERALIZED ANXIETY DISORDER: ICD-10-CM

## 2021-12-09 DIAGNOSIS — F41.1 GENERALIZED ANXIETY DISORDER: Primary | ICD-10-CM

## 2021-12-09 PROCEDURE — 90834 PSYTX W PT 45 MINUTES: CPT | Mod: 95 | Performed by: COUNSELOR

## 2021-12-09 RX ORDER — ALPRAZOLAM 1 MG
TABLET ORAL
Qty: 20 TABLET | Refills: 0 | Status: SHIPPED | OUTPATIENT
Start: 2021-12-09 | End: 2022-03-04

## 2021-12-09 NOTE — PROGRESS NOTES
"                                           Progress Note    Patient Name: Tracee Cooper  Date: 12/9/21         Service Type: Individual      Session Start Time: 2pm  Session End Time: 2:45     Session Length: 45    Session #: 33    Attendees: Client attended alone     Service Modality:  Phone Visit:     The patient has been notified of the following:      \"We have found that certain health care needs can be provided without the need for a face to face visit.  This service lets us provide the care you need with a phone conversation.       I will have full access to your [facility] medical record during this entire phone call.   I will be taking notes for your medical record.      Since this is like an office visit, we will bill your insurance company for this service.       There are potential benefits and risks of telephone visits (e.g. limits to patient confidentiality) that differ from in-person visits.?  Confidentiality still applies for telephone services, and nobody will record the visit.  It is important to be in a quiet, private space that is free of distractions (including cell phone or other devices) during the visit.??      If during the course of the call I believe a telephone visit is not appropriate, you will not be charged for this service\"     Consent has been obtained for this service by care team member: Yes     Treatment Plan Last Reviewed: 11/17/21  PHQ-9 / ORVILLE-7 :         DATA  Interactive Complexity: No  Crisis: No       Progress Since Last Session (Related to Symptoms / Goals / Homework):   Symptoms: Improving       Homework: Achieved / completed to satisfaction        Episode of Care Goals: Satisfactory progress - ACTION (Actively working towards change); Intervened by reinforcing change plan / affirming steps taken     Current / Ongoing Stressors and Concerns:   Ongoing: Anxiety and panic attacks while driving, history of family conflict  Current:   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 Discussed some concern around her daughter's mental health.      Treatment Objective(s) Addressed in This Session:    Patient will use cognitive strategies identified in therapy to identify and challenge negative self-talk / self-judgement.   Patient will use cognitive strategies identified in therapy to challenge anxious thoughts.    Patient will engage in daily practice of identifying a positive affirmation or personal success she feels proud of.      Intervention:   CBT:   Reinforced effective skills practice. Prompted use of checking the facts to respond to anxiety thoughts. Reflected and challenged distorted thinking patterns. Prompted identification of affirmations she wishes to utilize for future work-based triggers.       ASSESSMENT: Current Emotional / Mental Status (status of significant symptoms):   Risk status (Self / Other harm or suicidal ideation)   Patient denies current fears or concerns for personal safety.   Patient denies current or recent suicidal ideation or behaviors.   Patientdenies current or recent homicidal ideation or behaviors.   Patient denies current or recent self injurious behavior or ideation.   Patient denies other safety concerns.   Patient reports there has been no change in risk factors since their last session.     Patient reports there has been no change in protective factors since their last session.     Recommended that patient call 911 or go to the local ED should there be a change in any of these risk factors.     Appearance:   Not Assessed    Eye Contact:   Not Assessed    Psychomotor Behavior: Not Assessed    Attitude:   Cooperative    Orientation:   All   Speech    Rate / Production: Normal/ Responsive    Volume:  Normal    Mood:    Anxious    Affect:    Appropriate    Thought Content:  Clear    Thought Form:  Coherent  Logical    Insight:    Good       Medication Review:   No changes to current psychiatric medication(s)     Medication Compliance:   Yes     Changes in Health Issues:   None reported     Chemical Use Review:   Substance Use: Chemical use reviewed, no active concerns identified      Tobacco Use: No current tobacco use.      Diagnosis:  1. Generalized anxiety disorder         Collateral Reports Completed:   Not Applicable      PLAN: (Patient Tasks / Therapist Tasks / Other)  HOMEWORK:  Cognitive distortion challenge as addressed in session.        Eunice Mixon MA, [facility]                                                          ______________________________________________________________________    Treatment Plan    Patient's Name: Tracee Cooper  YOB: 1970    Date: 11/17/21    Diagnoses:  300.02 (F41.1) Generalized Anxiety Disorder   (f41.0) Panic disorder without agoraphobia  Rule out Posttraumatic Stress Disorder  Psychosocial & Contextual Factors: Past trauma, children's mental health concerns, work stressors  WHODAS: Completed    Referral / Collaboration:  Referral to another professional/service is not indicated at this time.    Anticipated number of session or this episode of care: 20      MeasurableTreatment Goal(s) related to diagnosis / functional impairment(s)  Goal 1: Patient will decrease anxiety levels as evidence by ORVILLE-7 scores, and client report.    I will know I've met my goal when I have less panic attacks, when I can drive to a new place without feeling anxious or panicked.      Objective #A (Patient Action)    Patient will identify 3 initial signs or symptoms of anxiety.  Status: Continued - Date(s): 12/16/19, 11/2/20, 2/4/21, 5/17/21, 11/17/21    Intervention(s)  Therapist will assign homework    teach emotional recognition/identification.      Objective #B  Patient will use cognitive strategies identified in therapy to challenge anxious thoughts.  Status: Completed - Date: 8/25/21      Intervention(s)  Therapist will assign homework    teach CBT techniques to identify and challenge cognitive distortions.    Objective #C  Patient will identify three distraction and diversion activities and use those activities to decrease level of anxiety  .  Status: Completed - Date: 8/25/21     Intervention(s)  Therapist will assign homework    teach DBT techniques including Mindfulness and Distress Tolerance.     Goal 2: Patient will increase sense of confidence and independence per client report.      Objective #A (Patient Action)    Patient will identify 3 ways in which she would like to increase sense of independence.  Status: New: 8/25/21, 11/17/21    Intervention(s)  Therapist will assign homework    teach emotional recognition/identification, teach values identification.      Objective #B  Patient will use cognitive strategies identified in therapy to identify and challenge negative self-talk / self-judgement.   Status: New: 8/25/21, 11/17/21    Intervention(s)  Therapist will assign homework    teach CBT techniques to identify and challenge cognitive distortions, teach positive affirmations    Objective #C  Patient will engage in daily practice of identifying a positive affirmation or personal success she feels proud of.  Status: New: 8/25/21, 11/17/21    Intervention(s)  Therapist will assign homework    teach CBT techniques        Patient has reviewed and agreed to the above plan.      Eunice Mixon MA, [facility]  12/9/21

## 2021-12-22 ENCOUNTER — VIRTUAL VISIT (OUTPATIENT)
Dept: PSYCHOLOGY | Facility: CLINIC | Age: 51
End: 2021-12-22
Payer: COMMERCIAL

## 2021-12-22 DIAGNOSIS — F41.1 GENERALIZED ANXIETY DISORDER: Primary | ICD-10-CM

## 2021-12-22 PROCEDURE — 90834 PSYTX W PT 45 MINUTES: CPT | Mod: 95 | Performed by: COUNSELOR

## 2021-12-22 NOTE — PROGRESS NOTES
"                                           Progress Note    Patient Name: Tracee Cooper  Date: 12/22/21         Service Type: Individual      Session Start Time: 2pm  Session End Time: 2:45     Session Length: 45    Session #: 33    Attendees: Client attended alone     Service Modality:  Phone Visit:     The patient has been notified of the following:      \"We have found that certain health care needs can be provided without the need for a face to face visit.  This service lets us provide the care you need with a phone conversation.       I will have full access to your [facility] medical record during this entire phone call.   I will be taking notes for your medical record.      Since this is like an office visit, we will bill your insurance company for this service.       There are potential benefits and risks of telephone visits (e.g. limits to patient confidentiality) that differ from in-person visits.?  Confidentiality still applies for telephone services, and nobody will record the visit.  It is important to be in a quiet, private space that is free of distractions (including cell phone or other devices) during the visit.??      If during the course of the call I believe a telephone visit is not appropriate, you will not be charged for this service\"     Consent has been obtained for this service by care team member: Yes     Treatment Plan Last Reviewed: 11/17/21  PHQ-9 / ORVILLE-7 :         DATA  Interactive Complexity: No  Crisis: No       Progress Since Last Session (Related to Symptoms / Goals / Homework):   Symptoms: No change       Homework: Achieved / completed to satisfaction         Episode of Care Goals: Satisfactory progress - ACTION (Actively working towards change); Intervened by reinforcing change plan / affirming steps taken     Current / Ongoing Stressors and Concerns:   Ongoing: Anxiety and panic attacks while driving, history of family conflict  Current:   Client discussed some ongoing " stressors, including worry about daughter's mental health and 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     Treatment Objective(s) Addressed in This Session:    Patient will use cognitive strategies identified in therapy to identify and challenge negative self-talk / self-judgement.   Patient will use cognitive strategies identified in therapy to challenge anxious thoughts.    Patient will engage in daily practice of identifying a positive affirmation or personal success she feels proud of.       Intervention:   CBT:   Reinforced effective skills practice. Prompted use of checking the facts to respond to anxiety thoughts. Reflected and challenged distorted thinking patterns. Use open-ended questions and summary to assist with insight around interpersonal triggers.      ASSESSMENT: Current Emotional / Mental Status (status of significant symptoms):   Risk status (Self / Other harm or suicidal ideation)   Patient denies current fears or concerns for personal safety.   Patient denies current or recent suicidal ideation or behaviors.   Patientdenies current or recent homicidal ideation or behaviors.   Patient denies current or recent self injurious behavior or ideation.   Patient denies other safety concerns.   Patient reports there has been no change in risk factors since their last session.     Patient reports there has been no change in protective factors since their last session.     Recommended that patient call 911 or go to the local ED should there be a change in any of these risk factors.     Appearance:   Not Assessed    Eye Contact:   Not Assessed    Psychomotor Behavior: Not Assessed    Attitude:   Cooperative    Orientation:   All   Speech    Rate / Production: Normal/ Responsive    Volume:  Normal    Mood:    Anxious    Affect:    Appropriate    Thought Content:  Clear    Thought Form:  Coherent  Logical    Insight:    Good      Medication Review:   No changes to current psychiatric  medication(s)     Medication Compliance:   Yes     Changes in Health Issues:   None reported     Chemical Use Review:   Substance Use: Chemical use reviewed, no active concerns identified      Tobacco Use: No current tobacco use.      Diagnosis:  1. Generalized anxiety disorder         Collateral Reports Completed:   Not Applicable      PLAN: (Patient Tasks / Therapist Tasks / Other)  HOMEWORK:  Cognitive distortion challenge and relaxation skills as addressed in session.   Self-esteem building practice (affirmations)      Eunice Mixon MA, [facility]                                                          ______________________________________________________________________    Treatment Plan    Patient's Name: Tracee Cooper  YOB: 1970    Date: 11/17/21    Diagnoses:  300.02 (F41.1) Generalized Anxiety Disorder   (f41.0) Panic disorder without agoraphobia  Rule out Posttraumatic Stress Disorder  Psychosocial & Contextual Factors: Past trauma, children's mental health concerns, work stressors  WHODAS: Completed    Referral / Collaboration:  Referral to another professional/service is not indicated at this time.    Anticipated number of session or this episode of care: 20      MeasurableTreatment Goal(s) related to diagnosis / functional impairment(s)  Goal 1: Patient will decrease anxiety levels as evidence by ORVILLE-7 scores, and client report.    I will know I've met my goal when I have less panic attacks, when I can drive to a new place without feeling anxious or panicked.      Objective #A (Patient Action)    Patient will identify 3 initial signs or symptoms of anxiety.  Status: Continued - Date(s): 12/16/19, 11/2/20, 2/4/21, 5/17/21, 11/17/21    Intervention(s)  Therapist will assign homework    teach emotional recognition/identification.      Objective #B  Patient will use cognitive strategies identified in therapy to challenge anxious thoughts.  Status: Completed - Date: 8/25/21      Intervention(s)  Therapist will assign homework    teach CBT techniques to identify and challenge cognitive distortions.    Objective #C  Patient will identify three distraction and diversion activities and use those activities to decrease level of anxiety  .  Status: Completed - Date: 8/25/21     Intervention(s)  Therapist will assign homework    teach DBT techniques including Mindfulness and Distress Tolerance.     Goal 2: Patient will increase sense of confidence and independence per client report.      Objective #A (Patient Action)    Patient will identify 3 ways in which she would like to increase sense of independence.  Status: New: 8/25/21, 11/17/21    Intervention(s)  Therapist will assign homework    teach emotional recognition/identification, teach values identification.      Objective #B  Patient will use cognitive strategies identified in therapy to identify and challenge negative self-talk / self-judgement.   Status: New: 8/25/21, 11/17/21    Intervention(s)  Therapist will assign homework    teach CBT techniques to identify and challenge cognitive distortions, teach positive affirmations    Objective #C  Patient will engage in daily practice of identifying a positive affirmation or personal success she feels proud of.  Status: New: 8/25/21, 11/17/21    Intervention(s)  Therapist will assign homework    teach CBT techniques        Patient has reviewed and agreed to the above plan.      Eunice Mixon MA, [facility]  12/22/21

## 2022-01-02 ENCOUNTER — HEALTH MAINTENANCE LETTER (OUTPATIENT)
Age: 52
End: 2022-01-02

## 2022-01-11 ENCOUNTER — OFFICE VISIT (OUTPATIENT)
Dept: FAMILY MEDICINE | Facility: CLINIC | Age: 52
End: 2022-01-11
Payer: COMMERCIAL

## 2022-01-11 VITALS
DIASTOLIC BLOOD PRESSURE: 84 MMHG | HEART RATE: 99 BPM | SYSTOLIC BLOOD PRESSURE: 138 MMHG | WEIGHT: 173 LBS | TEMPERATURE: 98.9 F | OXYGEN SATURATION: 97 % | BODY MASS INDEX: 29.24 KG/M2

## 2022-01-11 DIAGNOSIS — Z12.4 SCREENING FOR CERVICAL CANCER: ICD-10-CM

## 2022-01-11 DIAGNOSIS — Z12.31 ENCOUNTER FOR SCREENING MAMMOGRAM FOR BREAST CANCER: ICD-10-CM

## 2022-01-11 DIAGNOSIS — F41.1 GENERALIZED ANXIETY DISORDER: ICD-10-CM

## 2022-01-11 DIAGNOSIS — E04.1 THYROID NODULE: ICD-10-CM

## 2022-01-11 DIAGNOSIS — E78.5 HYPERLIPIDEMIA LDL GOAL <130: ICD-10-CM

## 2022-01-11 DIAGNOSIS — I10 ESSENTIAL HYPERTENSION WITH GOAL BLOOD PRESSURE LESS THAN 140/90: Primary | ICD-10-CM

## 2022-01-11 DIAGNOSIS — Z23 HIGH PRIORITY FOR 2019-NCOV VACCINE: ICD-10-CM

## 2022-01-11 LAB
ALBUMIN SERPL-MCNC: 4 G/DL (ref 3.4–5)
ALP SERPL-CCNC: 91 U/L (ref 40–150)
ALT SERPL W P-5'-P-CCNC: 54 U/L (ref 0–50)
ANION GAP SERPL CALCULATED.3IONS-SCNC: 6 MMOL/L (ref 3–14)
AST SERPL W P-5'-P-CCNC: 33 U/L (ref 0–45)
BILIRUB SERPL-MCNC: 0.3 MG/DL (ref 0.2–1.3)
BUN SERPL-MCNC: 14 MG/DL (ref 7–30)
CALCIUM SERPL-MCNC: 9.8 MG/DL (ref 8.5–10.1)
CHLORIDE BLD-SCNC: 103 MMOL/L (ref 94–109)
CO2 SERPL-SCNC: 26 MMOL/L (ref 20–32)
CREAT SERPL-MCNC: 0.89 MG/DL (ref 0.52–1.04)
GFR SERPL CREATININE-BSD FRML MDRD: 77 ML/MIN/1.73M2
GLUCOSE BLD-MCNC: 95 MG/DL (ref 70–99)
LDLC SERPL CALC-MCNC: 178 MG/DL
POTASSIUM BLD-SCNC: 4 MMOL/L (ref 3.4–5.3)
PROT SERPL-MCNC: 7.7 G/DL (ref 6.8–8.8)
SODIUM SERPL-SCNC: 135 MMOL/L (ref 133–144)
TSH SERPL DL<=0.005 MIU/L-ACNC: 1.34 MU/L (ref 0.4–4)

## 2022-01-11 PROCEDURE — 87624 HPV HI-RISK TYP POOLED RSLT: CPT | Performed by: FAMILY MEDICINE

## 2022-01-11 PROCEDURE — 80053 COMPREHEN METABOLIC PANEL: CPT | Performed by: FAMILY MEDICINE

## 2022-01-11 PROCEDURE — 83721 ASSAY OF BLOOD LIPOPROTEIN: CPT | Performed by: FAMILY MEDICINE

## 2022-01-11 PROCEDURE — G0145 SCR C/V CYTO,THINLAYER,RESCR: HCPCS | Performed by: FAMILY MEDICINE

## 2022-01-11 PROCEDURE — 36415 COLL VENOUS BLD VENIPUNCTURE: CPT | Performed by: FAMILY MEDICINE

## 2022-01-11 PROCEDURE — 84443 ASSAY THYROID STIM HORMONE: CPT | Performed by: FAMILY MEDICINE

## 2022-01-11 PROCEDURE — 91305 COVID-19,PF,PFIZER (12+ YRS): CPT | Performed by: FAMILY MEDICINE

## 2022-01-11 PROCEDURE — 99214 OFFICE O/P EST MOD 30 MIN: CPT | Performed by: FAMILY MEDICINE

## 2022-01-11 PROCEDURE — 0054A COVID-19,PF,PFIZER (12+ YRS): CPT | Performed by: FAMILY MEDICINE

## 2022-01-11 RX ORDER — VENLAFAXINE HYDROCHLORIDE 150 MG/1
CAPSULE, EXTENDED RELEASE ORAL
Qty: 90 CAPSULE | Refills: 1 | Status: SHIPPED | OUTPATIENT
Start: 2022-01-11 | End: 2022-07-14

## 2022-01-11 RX ORDER — AMLODIPINE BESYLATE 10 MG/1
10 TABLET ORAL DAILY
Qty: 90 TABLET | Refills: 3 | Status: SHIPPED | OUTPATIENT
Start: 2022-01-11 | End: 2022-07-26 | Stop reason: SINTOL

## 2022-01-11 ASSESSMENT — PATIENT HEALTH QUESTIONNAIRE - PHQ9
5. POOR APPETITE OR OVEREATING: NOT AT ALL
SUM OF ALL RESPONSES TO PHQ QUESTIONS 1-9: 2

## 2022-01-11 ASSESSMENT — ANXIETY QUESTIONNAIRES
GAD7 TOTAL SCORE: 4
1. FEELING NERVOUS, ANXIOUS, OR ON EDGE: SEVERAL DAYS
6. BECOMING EASILY ANNOYED OR IRRITABLE: SEVERAL DAYS
2. NOT BEING ABLE TO STOP OR CONTROL WORRYING: NOT AT ALL
3. WORRYING TOO MUCH ABOUT DIFFERENT THINGS: SEVERAL DAYS
7. FEELING AFRAID AS IF SOMETHING AWFUL MIGHT HAPPEN: SEVERAL DAYS
5. BEING SO RESTLESS THAT IT IS HARD TO SIT STILL: NOT AT ALL
IF YOU CHECKED OFF ANY PROBLEMS ON THIS QUESTIONNAIRE, HOW DIFFICULT HAVE THESE PROBLEMS MADE IT FOR YOU TO DO YOUR WORK, TAKE CARE OF THINGS AT HOME, OR GET ALONG WITH OTHER PEOPLE: NOT DIFFICULT AT ALL

## 2022-01-11 NOTE — PROGRESS NOTES
A/P:      ICD-10-CM    1. Essential hypertension with goal blood pressure less than 140/90  I10 amLODIPine (NORVASC) 10 MG tablet     Comprehensive metabolic panel (BMP + Alb, Alk Phos, ALT, AST, Total. Bili, TP)     Comprehensive metabolic panel (BMP + Alb, Alk Phos, ALT, AST, Total. Bili, TP)   2. Generalized anxiety disorder  F41.1 venlafaxine (EFFEXOR-XR) 150 MG 24 hr capsule   3. Thyroid nodule  E04.1 US Thyroid     TSH with free T4 reflex     TSH with free T4 reflex   4. Hyperlipidemia LDL goal <130  E78.5 LDL cholesterol direct     Comprehensive metabolic panel (BMP + Alb, Alk Phos, ALT, AST, Total. Bili, TP)     LDL cholesterol direct     Comprehensive metabolic panel (BMP + Alb, Alk Phos, ALT, AST, Total. Bili, TP)   5. Screening for cervical cancer  Z12.4 Pap screen with HPV - recommended age 30 - 65 years   6. Encounter for screening mammogram for breast cancer  Z12.31 *MA Screening Digital Bilateral   7. High priority for 2019-nCoV vaccine  Z23 COVID-19,PF,PFIZER (12+ Yrs GRAY LABEL)     HTN:  Well controlled.  Continue current meds    ORVILLE:  Well controlled.  Continue meds and therapy    Thyroid nodule:  Over due for repeat imaging, s/p benign FNA 2019.      Lipids:  Elevated on last check.  Not fasting.  Plan LDL.  If in similar range pt agrees to begin statin        Subjective   Jennie is a 51 year old who presents for the following health issues     HPI     * stopped losartan 1 month ago due to muscle aches, symptoms have resolved         Hypertension Follow-up      Do you check your blood pressure regularly outside of the clinic? No     Are you following a low salt diet? No    Are your blood pressures ever more than 140 on the top number (systolic) OR more   than 90 on the bottom number (diastolic), for example 140/90? Not checking     Anxiety Follow-Up    How are you doing with your anxiety since your last visit? No change/stable    Are you having other symptoms that might be associated with  anxiety? No    Have you had a significant life event? No     Are you feeling depressed? No    Do you have any concerns with your use of alcohol or other drugs? No    Social History     Tobacco Use     Smoking status: Former Smoker     Types: Cigarettes     Quit date: 1/31/2006     Years since quitting: 15.9     Smokeless tobacco: Never Used   Substance Use Topics     Alcohol use: Yes     Comment: occasionally     Drug use: No     ORVILLE-7 SCORE 9/29/2020 12/15/2020 1/11/2022   Total Score - - -   Total Score 4 (minimal anxiety) - -   Total Score 4 8 4     PHQ 9/29/2020 12/15/2020 1/11/2022   PHQ-9 Total Score 2 1 2   Q9: Thoughts of better off dead/self-harm past 2 weeks Not at all Not at all Not at all     Feels things are going really well with her anxiety,  Using alprazolam a couple of times per month.      Does take it at night some times when she wakes up hot and sweaty and anxious.  Feels this works well then    *  has some daytime hot flashes but not too bothersome.  No menses since her ablation    *  Reports she recently saw dermatology in f/u of her melanoma.    Review of Systems   Constitutional, HEENT, cardiovascular, pulmonary, gi and gu systems are negative, except as otherwise noted.      Objective    /84   Pulse 99   Temp 98.9  F (37.2  C) (Tympanic)   Wt 78.5 kg (173 lb)   SpO2 97%   Breastfeeding No   BMI 29.24 kg/m    Body mass index is 29.24 kg/m .  Physical Exam   GENERAL: healthy, alert and no distress  EYES: Eyes grossly normal to inspection, PERRL and conjunctivae and sclerae normal  NECK: no adenopathy, no asymmetry, masses, or scars and thyroid normal to palpation  RESP: lungs clear to auscultation - no rales, rhonchi or wheezes  BREAST: normal without masses, tenderness or nipple discharge and no palpable axillary masses or adenopathy  CV: regular rate and rhythm, normal S1 S2, no S3 or S4, no murmur, click or rub, no peripheral edema and peripheral pulses strong  ABDOMEN: soft,  nontender, no hepatosplenomegaly, no masses and bowel sounds normal   (female): normal female external genitalia, normal urethral meatus, vaginal mucosa pink, moist, well rugated, and normal cervix/adnexa/uterus without masses or discharge  MS: no gross musculoskeletal defects noted, no edema  NEURO: Normal strength and tone, mentation intact and speech normal  PSYCH: mentation appears normal, affect normal/bright    Epic reviewed

## 2022-01-11 NOTE — NURSING NOTE
"Initial /84   Pulse 99   Temp 98.9  F (37.2  C) (Tympanic)   Wt 78.5 kg (173 lb)   SpO2 97%   Breastfeeding No   BMI 29.24 kg/m   Estimated body mass index is 29.24 kg/m  as calculated from the following:    Height as of 12/15/20: 1.638 m (5' 4.5\").    Weight as of this encounter: 78.5 kg (173 lb). .      "

## 2022-01-11 NOTE — PATIENT INSTRUCTIONS
Please call [phone] to schedule your thyroid ultrasound and mammogram.  The thyroid ultrasound can be done at [facility]    I will let you know the lab results when they are back.  I assume we'll need to begin a cholesterol medicine as we discussed.

## 2022-01-12 ENCOUNTER — VIRTUAL VISIT (OUTPATIENT)
Dept: PSYCHOLOGY | Facility: CLINIC | Age: 52
End: 2022-01-12
Payer: COMMERCIAL

## 2022-01-12 DIAGNOSIS — F41.1 GENERALIZED ANXIETY DISORDER: Primary | ICD-10-CM

## 2022-01-12 PROCEDURE — 90834 PSYTX W PT 45 MINUTES: CPT | Mod: 95 | Performed by: COUNSELOR

## 2022-01-12 ASSESSMENT — ANXIETY QUESTIONNAIRES: GAD7 TOTAL SCORE: 4

## 2022-01-12 NOTE — PROGRESS NOTES
"                                           Progress Note    Patient Name: Tracee Cooper  Date: 1/12/22         Service Type: Individual      Session Start Time: 11am  Session End Time: 11:45     Session Length: 45    Session #: 34    Attendees: Client attended alone     Service Modality:  Phone Visit:     The patient has been notified of the following:      \"We have found that certain health care needs can be provided without the need for a face to face visit.  This service lets us provide the care you need with a phone conversation.       I will have full access to your [facility] medical record during this entire phone call.   I will be taking notes for your medical record.      Since this is like an office visit, we will bill your insurance company for this service.       There are potential benefits and risks of telephone visits (e.g. limits to patient confidentiality) that differ from in-person visits.?  Confidentiality still applies for telephone services, and nobody will record the visit.  It is important to be in a quiet, private space that is free of distractions (including cell phone or other devices) during the visit.??      If during the course of the call I believe a telephone visit is not appropriate, you will not be charged for this service\"     Consent has been obtained for this service by care team member: Yes     Treatment Plan Last Reviewed: 11/17/21  PHQ-9 / ORVILLE-7 :         DATA  Interactive Complexity: No  Crisis: No       Progress Since Last Session (Related to Symptoms / Goals / Homework):   Symptoms: No change       Homework: Partially completed        Episode of Care Goals: Satisfactory progress - ACTION (Actively working towards change); Intervened by reinforcing change plan / affirming steps taken     Current / Ongoing Stressors and Concerns:   Ongoing: Anxiety and panic attacks while driving, history of family conflict  Current:   Client discussed medical appointment that has triggered " "some stress around weight and health. Does report reduction in blood pressure, but has issue with having to take medications for cholesterol. Notices the more she focuses on weight loss and eating, the more she \"self-sabotages\" and snacks.     Treatment Objective(s) Addressed in This Session:    Patient will use cognitive strategies identified in therapy to identify and challenge negative self-talk / self-judgement.   Patient will engage in daily practice of identifying a positive affirmation or personal success she feels proud of.       Intervention:   CBT:   Reinforced effective skills practice. Reflected and challenged distorted thinking patterns. Use open-ended questions and summary to assist with insight around disordered eating history and symptoms. Offered psychoeducation around restrictive eating patterns and its impact on mental health. Coached on balanced goal setting versus all or nothing thinking.    ASSESSMENT: Current Emotional / Mental Status (status of significant symptoms):   Risk status (Self / Other harm or suicidal ideation)   Patient denies current fears or concerns for personal safety.   Patient denies current or recent suicidal ideation or behaviors.   Patientdenies current or recent homicidal ideation or behaviors.   Patient denies current or recent self injurious behavior or ideation.   Patient denies other safety concerns.   Patient reports there has been no change in risk factors since their last session.     Patient reports there has been no change in protective factors since their last session.     Recommended that patient call 911 or go to the local ED should there be a change in any of these risk factors.     Appearance:   Not Assessed    Eye Contact:   Not Assessed    Psychomotor Behavior: Not Assessed    Attitude:   Cooperative    Orientation:   All   Speech    Rate / Production: Normal/ Responsive    Volume:  Normal    Mood:    Anxious    Affect:    Appropriate    Thought " Content:  Clear    Thought Form:  Coherent  Logical    Insight:    Fair      Medication Review:   No changes to current psychiatric medication(s)     Medication Compliance:   Yes     Changes in Health Issues:   None reported     Chemical Use Review:   Substance Use: Chemical use reviewed, no active concerns identified      Tobacco Use: No current tobacco use.      Diagnosis:  1. Generalized anxiety disorder         Collateral Reports Completed:   Not Applicable      PLAN: (Patient Tasks / Therapist Tasks / Other)  HOMEWORK:  Mindfulness around eat patterns relation to anxiety (intermittent fasting increasing anxiety?). Practicing activation goals versus restriction goals.       Eunice Mixon MA, [facility]                                                          ______________________________________________________________________    Treatment Plan    Patient's Name: Tracee Cooper  YOB: 1970    Date: 11/17/21    Diagnoses:  300.02 (F41.1) Generalized Anxiety Disorder   (f41.0) Panic disorder without agoraphobia  Rule out Posttraumatic Stress Disorder  Psychosocial & Contextual Factors: Past trauma, children's mental health concerns, work stressors  WHODAS: Completed    Referral / Collaboration:  Referral to another professional/service is not indicated at this time.    Anticipated number of session or this episode of care: 20      MeasurableTreatment Goal(s) related to diagnosis / functional impairment(s)  Goal 1: Patient will decrease anxiety levels as evidence by ORVILLE-7 scores, and client report.    I will know I've met my goal when I have less panic attacks, when I can drive to a new place without feeling anxious or panicked.      Objective #A (Patient Action)    Patient will identify 3 initial signs or symptoms of anxiety.  Status: Continued - Date(s): 12/16/19, 11/2/20, 2/4/21, 5/17/21, 11/17/21    Intervention(s)  Therapist will assign homework    teach emotional recognition/identification.       Objective #B  Patient will use cognitive strategies identified in therapy to challenge anxious thoughts.  Status: Completed - Date: 8/25/21     Intervention(s)  Therapist will assign homework    teach CBT techniques to identify and challenge cognitive distortions.    Objective #C  Patient will identify three distraction and diversion activities and use those activities to decrease level of anxiety  .  Status: Completed - Date: 8/25/21     Intervention(s)  Therapist will assign homework    teach DBT techniques including Mindfulness and Distress Tolerance.     Goal 2: Patient will increase sense of confidence and independence per client report.      Objective #A (Patient Action)    Patient will identify 3 ways in which she would like to increase sense of independence.  Status: New: 8/25/21, 11/17/21    Intervention(s)  Therapist will assign homework    teach emotional recognition/identification, teach values identification.      Objective #B  Patient will use cognitive strategies identified in therapy to identify and challenge negative self-talk / self-judgement.   Status: New: 8/25/21, 11/17/21    Intervention(s)  Therapist will assign homework    teach CBT techniques to identify and challenge cognitive distortions, teach positive affirmations    Objective #C  Patient will engage in daily practice of identifying a positive affirmation or personal success she feels proud of.  Status: New: 8/25/21, 11/17/21    Intervention(s)  Therapist will assign homework    teach CBT techniques        Patient has reviewed and agreed to the above plan.      Eunice Mixon MA, [facility]  1/12/22

## 2022-01-14 DIAGNOSIS — E78.5 HYPERLIPIDEMIA LDL GOAL <130: Primary | ICD-10-CM

## 2022-01-14 LAB
BKR LAB AP GYN ADEQUACY: NORMAL
BKR LAB AP GYN INTERPRETATION: NORMAL
BKR LAB AP HPV REFLEX: NORMAL
BKR LAB AP PREVIOUS ABNORMAL: NORMAL
PATH REPORT.COMMENTS IMP SPEC: NORMAL
PATH REPORT.COMMENTS IMP SPEC: NORMAL
PATH REPORT.RELEVANT HX SPEC: NORMAL

## 2022-01-14 RX ORDER — ATORVASTATIN CALCIUM 20 MG/1
20 TABLET, FILM COATED ORAL DAILY
Qty: 90 TABLET | Refills: 0 | Status: SHIPPED | OUTPATIENT
Start: 2022-01-14 | End: 2022-04-18

## 2022-01-18 LAB
HUMAN PAPILLOMA VIRUS 16 DNA: NEGATIVE
HUMAN PAPILLOMA VIRUS 18 DNA: NEGATIVE
HUMAN PAPILLOMA VIRUS FINAL DIAGNOSIS: NORMAL
HUMAN PAPILLOMA VIRUS OTHER HR: NEGATIVE

## 2022-01-27 DIAGNOSIS — I10 ESSENTIAL HYPERTENSION WITH GOAL BLOOD PRESSURE LESS THAN 140/90: ICD-10-CM

## 2022-01-27 RX ORDER — LOSARTAN POTASSIUM 25 MG/1
TABLET ORAL
Qty: 90 TABLET | Refills: 3 | OUTPATIENT
Start: 2022-01-27

## 2022-02-07 ENCOUNTER — VIRTUAL VISIT (OUTPATIENT)
Dept: PSYCHOLOGY | Facility: CLINIC | Age: 52
End: 2022-02-07
Payer: COMMERCIAL

## 2022-02-07 DIAGNOSIS — F41.1 GENERALIZED ANXIETY DISORDER: Primary | ICD-10-CM

## 2022-02-07 DIAGNOSIS — F41.0 PANIC DISORDER WITHOUT AGORAPHOBIA: ICD-10-CM

## 2022-02-07 PROCEDURE — 90834 PSYTX W PT 45 MINUTES: CPT | Mod: 95 | Performed by: COUNSELOR

## 2022-02-07 NOTE — PROGRESS NOTES
"                                           Progress Note    Patient Name: Tracee Cooper  Date: 2/7/22         Service Type: Individual      Session Start Time: 10:03am  Session End Time: 10:45     Session Length: 42    Session #: 35    Attendees: Client attended alone     Service Modality:  Phone Visit:     The patient has been notified of the following:      \"We have found that certain health care needs can be provided without the need for a face to face visit.  This service lets us provide the care you need with a phone conversation.       I will have full access to your [facility] medical record during this entire phone call.   I will be taking notes for your medical record.      Since this is like an office visit, we will bill your insurance company for this service.       There are potential benefits and risks of telephone visits (e.g. limits to patient confidentiality) that differ from in-person visits.?  Confidentiality still applies for telephone services, and nobody will record the visit.  It is important to be in a quiet, private space that is free of distractions (including cell phone or other devices) during the visit.??      If during the course of the call I believe a telephone visit is not appropriate, you will not be charged for this service\"     Consent has been obtained for this service by care team member: Yes     Treatment Plan Last Reviewed: 11/17/21 - UPDATE IN NEXT SESSION  PHQ-9 / ORVILLE-7 :         DATA  Interactive Complexity: No  Crisis: No       Progress Since Last Session (Related to Symptoms / Goals / Homework):   Symptoms: Improving        Homework: Partially completed          Episode of Care Goals: Satisfactory progress - ACTION (Actively working towards change); Intervened by reinforcing change plan / affirming steps taken     Current / Ongoing Stressors and Concerns:   Ongoing: Anxiety and panic attacks while driving, history of family conflict  Current:     Client reports some " interpersonal challenges that occurred at work, and how it was resolved. 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 Notes ongoing worry about daughter's weight and mental health. Struggles to know how to address this and feels guilty if talking to others about it.      Treatment Objective(s) Addressed in This Session:    Patient will use cognitive strategies identified in therapy to identify and challenge negative self-talk / self-judgement.   Patient will engage in daily practice of identifying a positive affirmation or personal success she feels proud of.        Intervention:   CBT:   Reinforced effective skills practice. Used open-ended questions and summary to assist with insight. Offered psychoeducation around disordered eating and it's relation to mental health. Explored belief systems within family that may impact emotions around body image and food. Discussed support groups for effective outlets around worry for daughter.    ASSESSMENT: Current Emotional / Mental Status (status of significant symptoms):   Risk status (Self / Other harm or suicidal ideation)   Patient denies current fears or concerns for personal safety.   Patient denies current or recent suicidal ideation or behaviors.   Patientdenies current or recent homicidal ideation or behaviors.   Patient denies current or recent self injurious behavior or ideation.   Patient denies other safety concerns.   Patient reports there has been no change in risk factors since their last session.     Patient reports there has been no change in protective factors since their last session.     Recommended that patient call 911 or go to the local ED should there be a change in any of these risk factors.     Appearance:   Not Assessed    Eye Contact:   Not Assessed    Psychomotor Behavior: Not Assessed    Attitude:   Cooperative    Orientation:   All   Speech    Rate / Production: Normal/ Responsive    Volume:  Normal     Mood:    Anxious    Affect:    Appropriate    Thought Content:  Clear    Thought Form:  Coherent  Logical      Insight:    Fair      Medication Review:   No changes to current psychiatric medication(s)     Medication Compliance:   Yes     Changes in Health Issues:   None reported     Chemical Use Review:   Substance Use: Chemical use reviewed, no active concerns identified      Tobacco Use: No current tobacco use.      Diagnosis:  1. Generalized anxiety disorder    2. Panic disorder without agoraphobia         Collateral Reports Completed:   Not Applicable      PLAN: (Patient Tasks / Therapist Tasks / Other)  HOMEWORK:    Look into supports for parents of children with disordered eating.       Eunice Mixon MA, [facility]                                                          ______________________________________________________________________    Treatment Plan    Patient's Name: Tracee Cooper  YOB: 1970    Date: 11/17/21    Diagnoses:  300.02 (F41.1) Generalized Anxiety Disorder   (f41.0) Panic disorder without agoraphobia  Rule out Posttraumatic Stress Disorder  Psychosocial & Contextual Factors: Past trauma, children's mental health concerns, work stressors  WHODAS: Completed    Referral / Collaboration:  Referral to another professional/service is not indicated at this time.    Anticipated number of session or this episode of care: 20      MeasurableTreatment Goal(s) related to diagnosis / functional impairment(s)  Goal 1: Patient will decrease anxiety levels as evidence by ORVILLE-7 scores, and client report.    I will know I've met my goal when I have less panic attacks, when I can drive to a new place without feeling anxious or panicked.      Objective #A (Patient Action)    Patient will identify 3 initial signs or symptoms of anxiety.  Status: Continued - Date(s): 12/16/19, 11/2/20, 2/4/21, 5/17/21, 11/17/21    Intervention(s)  Therapist will assign homework    teach emotional  recognition/identification.      Objective #B  Patient will use cognitive strategies identified in therapy to challenge anxious thoughts.  Status: Completed - Date: 8/25/21     Intervention(s)  Therapist will assign homework    teach CBT techniques to identify and challenge cognitive distortions.    Objective #C  Patient will identify three distraction and diversion activities and use those activities to decrease level of anxiety  .  Status: Completed - Date: 8/25/21     Intervention(s)  Therapist will assign homework    teach DBT techniques including Mindfulness and Distress Tolerance.     Goal 2: Patient will increase sense of confidence and independence per client report.      Objective #A (Patient Action)    Patient will identify 3 ways in which she would like to increase sense of independence.  Status: New: 8/25/21, 11/17/21    Intervention(s)  Therapist will assign homework    teach emotional recognition/identification, teach values identification.      Objective #B  Patient will use cognitive strategies identified in therapy to identify and challenge negative self-talk / self-judgement.   Status: New: 8/25/21, 11/17/21    Intervention(s)  Therapist will assign homework    teach CBT techniques to identify and challenge cognitive distortions, teach positive affirmations    Objective #C  Patient will engage in daily practice of identifying a positive affirmation or personal success she feels proud of.  Status: New: 8/25/21, 11/17/21    Intervention(s)  Therapist will assign homework    teach CBT techniques        Patient has reviewed and agreed to the above plan.      Eunice Mixon MA, [facility]  2/7/22

## 2022-02-22 ENCOUNTER — VIRTUAL VISIT (OUTPATIENT)
Dept: PSYCHOLOGY | Facility: CLINIC | Age: 52
End: 2022-02-22
Payer: COMMERCIAL

## 2022-02-22 DIAGNOSIS — F41.1 GENERALIZED ANXIETY DISORDER: Primary | ICD-10-CM

## 2022-02-22 PROCEDURE — 90834 PSYTX W PT 45 MINUTES: CPT | Mod: 95 | Performed by: COUNSELOR

## 2022-02-28 NOTE — PROGRESS NOTES
"                                           Progress Note    Patient Name: Tracee Cooper  Date: 2/28/22         Service Type: Individual      Session Start Time: 11am  Session End Time: 11:45     Session Length: 45    Session #: 36    Attendees: Client attended alone     Service Modality:  Phone Visit:     The patient has been notified of the following:      \"We have found that certain health care needs can be provided without the need for a face to face visit.  This service lets us provide the care you need with a phone conversation.       I will have full access to your [place] medical record during this entire phone call.   I will be taking notes for your medical record.      Since this is like an office visit, we will bill your insurance company for this service.       There are potential benefits and risks of telephone visits (e.g. limits to patient confidentiality) that differ from in-person visits.?  Confidentiality still applies for telephone services, and nobody will record the visit.  It is important to be in a quiet, private space that is free of distractions (including cell phone or other devices) during the visit.??      If during the course of the call I believe a telephone visit is not appropriate, you will not be charged for this service\"     Consent has been obtained for this service by care team member: Yes     Treatment Plan Last Reviewed: 11/17/21 - UPDATE IN NEXT SESSION  PHQ-9 / ORVILLE-7 :         DATA  Interactive Complexity: No  Crisis: No       Progress Since Last Session (Related to Symptoms / Goals / Homework):   Symptoms: Improving        Homework: Partially completed          Episode of Care Goals: Satisfactory progress - ACTION (Actively working towards change); Intervened by reinforcing change plan / affirming steps taken     Current / Ongoing Stressors and Concerns:   Ongoing: Anxiety and panic attacks while driving, history of family conflict  Current:     Client discussed some " interpersonal challenges at work, and how to cope with others who have a different communication style.      Treatment Objective(s) Addressed in This Session:    Patient will use cognitive strategies identified in therapy to identify and challenge negative self-talk / self-judgement.        Intervention:   CBT:   Reinforced effective skills practice. Used open-ended questions and summary to assist with insight. Coached on communication skills.    ASSESSMENT: Current Emotional / Mental Status (status of significant symptoms):   Risk status (Self / Other harm or suicidal ideation)   Patient denies current fears or concerns for personal safety.   Patient denies current or recent suicidal ideation or behaviors.   Patientdenies current or recent homicidal ideation or behaviors.   Patient denies current or recent self injurious behavior or ideation.   Patient denies other safety concerns.   Patient reports there has been no change in risk factors since their last session.     Patient reports there has been no change in protective factors since their last session.     Recommended that patient call 911 or go to the local ED should there be a change in any of these risk factors.     Appearance:   Not Assessed    Eye Contact:   Not Assessed    Psychomotor Behavior: Not Assessed    Attitude:   Cooperative    Orientation:   All   Speech    Rate / Production: Normal/ Responsive    Volume:  Normal    Mood:    Anxious    Affect:    Appropriate    Thought Content:  Clear    Thought Form:  Coherent  Logical      Insight:    Fair      Medication Review:   No changes to current psychiatric medication(s)     Medication Compliance:   Yes     Changes in Health Issues:   None reported     Chemical Use Review:   Substance Use: Chemical use reviewed, no active concerns identified      Tobacco Use: No current tobacco use.      Diagnosis:  1. Generalized anxiety disorder         Collateral Reports Completed:   Not Applicable      PLAN:  (Patient Tasks / Therapist Tasks / Other)  HOMEWORK:    Look into supports for parents of children with disordered eating.       Eunice Mixon MA, [facility]                                                          ______________________________________________________________________    Treatment Plan    Patient's Name: Tracee Cooper  YOB: 1970    Date: 11/17/21    Diagnoses:  300.02 (F41.1) Generalized Anxiety Disorder   (f41.0) Panic disorder without agoraphobia  Rule out Posttraumatic Stress Disorder  Psychosocial & Contextual Factors: Past trauma, children's mental health concerns, work stressors  WHODAS: Completed    Referral / Collaboration:  Referral to another professional/service is not indicated at this time.    Anticipated number of session or this episode of care: 20      MeasurableTreatment Goal(s) related to diagnosis / functional impairment(s)  Goal 1: Patient will decrease anxiety levels as evidence by ORVILLE-7 scores, and client report.    I will know I've met my goal when I have less panic attacks, when I can drive to a new place without feeling anxious or panicked.      Objective #A (Patient Action)    Patient will identify 3 initial signs or symptoms of anxiety.  Status: Continued - Date(s): 12/16/19, 11/2/20, 2/4/21, 5/17/21, 11/17/21    Intervention(s)  Therapist will assign homework    teach emotional recognition/identification.      Objective #B  Patient will use cognitive strategies identified in therapy to challenge anxious thoughts.  Status: Completed - Date: 8/25/21     Intervention(s)  Therapist will assign homework    teach CBT techniques to identify and challenge cognitive distortions.    Objective #C  Patient will identify three distraction and diversion activities and use those activities to decrease level of anxiety  .  Status: Completed - Date: 8/25/21     Intervention(s)  Therapist will assign homework    teach DBT techniques including Mindfulness and Distress  Tolerance.     Goal 2: Patient will increase sense of confidence and independence per client report.      Objective #A (Patient Action)    Patient will identify 3 ways in which she would like to increase sense of independence.  Status: New: 8/25/21, 11/17/21    Intervention(s)  Therapist will assign homework    teach emotional recognition/identification, teach values identification.      Objective #B  Patient will use cognitive strategies identified in therapy to identify and challenge negative self-talk / self-judgement.   Status: New: 8/25/21, 11/17/21    Intervention(s)  Therapist will assign homework    teach CBT techniques to identify and challenge cognitive distortions, teach positive affirmations    Objective #C  Patient will engage in daily practice of identifying a positive affirmation or personal success she feels proud of.  Status: New: 8/25/21, 11/17/21    Intervention(s)  Therapist will assign homework    teach CBT techniques        Patient has reviewed and agreed to the above plan.      Eunice Mixon MA, [facility]  2/28/22

## 2022-03-04 ENCOUNTER — MYC REFILL (OUTPATIENT)
Dept: FAMILY MEDICINE | Facility: CLINIC | Age: 52
End: 2022-03-04
Payer: COMMERCIAL

## 2022-03-04 DIAGNOSIS — F41.1 GENERALIZED ANXIETY DISORDER: ICD-10-CM

## 2022-03-04 RX ORDER — ALPRAZOLAM 1 MG
TABLET ORAL
Qty: 20 TABLET | Refills: 0 | Status: SHIPPED | OUTPATIENT
Start: 2022-03-04 | End: 2022-05-31

## 2022-03-16 ENCOUNTER — VIRTUAL VISIT (OUTPATIENT)
Dept: PSYCHOLOGY | Facility: CLINIC | Age: 52
End: 2022-03-16
Payer: COMMERCIAL

## 2022-03-16 DIAGNOSIS — F41.1 GENERALIZED ANXIETY DISORDER: Primary | ICD-10-CM

## 2022-03-16 PROCEDURE — 90834 PSYTX W PT 45 MINUTES: CPT | Mod: 95 | Performed by: COUNSELOR

## 2022-03-16 NOTE — PROGRESS NOTES
"                                           Progress Note    Patient Name: Tracee Cooper  Date: 3/16/22         Service Type: Individual      Session Start Time: 11am  Session End Time: 11:45     Session Length: 45    Session #: 37    Attendees: Client attended alone     Service Modality:  Phone Visit:     The patient has been notified of the following:      \"We have found that certain health care needs can be provided without the need for a face to face visit.  This service lets us provide the care you need with a phone conversation.       I will have full access to your [place] medical record during this entire phone call.   I will be taking notes for your medical record.      Since this is like an office visit, we will bill your insurance company for this service.       There are potential benefits and risks of telephone visits (e.g. limits to patient confidentiality) that differ from in-person visits.?  Confidentiality still applies for telephone services, and nobody will record the visit.  It is important to be in a quiet, private space that is free of distractions (including cell phone or other devices) during the visit.??      If during the course of the call I believe a telephone visit is not appropriate, you will not be charged for this service\"     Consent has been obtained for this service by care team member: Yes     Treatment Plan Last Reviewed: update needed  PHQ-9 / ORVILLE-7 :         DATA  Interactive Complexity: No  Crisis: No       Progress Since Last Session (Related to Symptoms / Goals / Homework):   Symptoms: No change        Homework: Partially completed           Episode of Care Goals: Satisfactory progress - ACTION (Actively working towards change); Intervened by reinforcing change plan / affirming steps taken     Current / Ongoing Stressors and Concerns:   Ongoing: Anxiety and panic attacks while driving, history of family conflict  Current:    Client reports having a string of nightmares " recently -  waking her up due to yelling in sleep, increased anxiety, waking up in a full sweat. Unclear of trigger, but does acknowledge some increase in stress recently. Denies medication changes or physical health concerns.        Treatment Objective(s) Addressed in This Session:    Patient will use cognitive strategies identified in therapy to identify and challenge negative self-talk / self-judgement.   Patient will identify three distraction and diversion activities and use those activities to decrease level of anxiety  .       Intervention:   CBT:   Reinforced effective skills practice. Used open-ended questions and summary to assist with insight. Coached on relaxation, grounding, and mindfulness techniques.      ASSESSMENT: Current Emotional / Mental Status (status of significant symptoms):   Risk status (Self / Other harm or suicidal ideation)   Patient denies current fears or concerns for personal safety.   Patient denies current or recent suicidal ideation or behaviors.   Patientdenies current or recent homicidal ideation or behaviors.   Patient denies current or recent self injurious behavior or ideation.   Patient denies other safety concerns.   Patient reports there has been no change in risk factors since their last session.     Patient reports there has been no change in protective factors since their last session.     Recommended that patient call 911 or go to the local ED should there be a change in any of these risk factors.     Appearance:   Not Assessed    Eye Contact:   Not Assessed    Psychomotor Behavior: Not Assessed    Attitude:   Cooperative    Orientation:   All   Speech    Rate / Production: Normal/ Responsive    Volume:  Normal    Mood:    Anxious    Affect:    Appropriate    Thought Content:  Clear    Thought Form:  Coherent  Logical      Insight:    Fair      Medication Review:   No changes to current psychiatric medication(s)     Medication Compliance:   Yes     Changes in  Health Issues:   None reported     Chemical Use Review:   Substance Use: Chemical use reviewed, no active concerns identified      Tobacco Use: No current tobacco use.      Diagnosis:  1. Generalized anxiety disorder         Collateral Reports Completed:   Not Applicable      PLAN: (Patient Tasks / Therapist Tasks / Other)  HOMEWORK:    Look into supports for parents of children with disordered eating.        Eunice Mixon MA, [facility]                                                          ______________________________________________________________________    Treatment Plan    Patient's Name: Tracee Cooper  YOB: 1970    Date: 11/17/21    Diagnoses:  300.02 (F41.1) Generalized Anxiety Disorder   (f41.0) Panic disorder without agoraphobia  Rule out Posttraumatic Stress Disorder  Psychosocial & Contextual Factors: Past trauma, children's mental health concerns, work stressors  WHODAS: Completed    Referral / Collaboration:  Referral to another professional/service is not indicated at this time.    Anticipated number of session or this episode of care: 20      MeasurableTreatment Goal(s) related to diagnosis / functional impairment(s)  Goal 1: Patient will decrease anxiety levels as evidence by ORVILLE-7 scores, and client report.    I will know I've met my goal when I have less panic attacks, when I can drive to a new place without feeling anxious or panicked.      Objective #A (Patient Action)    Patient will identify 3 initial signs or symptoms of anxiety.  Status: Continued - Date(s): 12/16/19, 11/2/20, 2/4/21, 5/17/21, 11/17/21    Intervention(s)  Therapist will assign homework    teach emotional recognition/identification.      Objective #B  Patient will use cognitive strategies identified in therapy to challenge anxious thoughts.  Status: Completed - Date: 8/25/21     Intervention(s)  Therapist will assign homework    teach CBT techniques to identify and challenge cognitive  distortions.    Objective #C  Patient will identify three distraction and diversion activities and use those activities to decrease level of anxiety  .  Status: Completed - Date: 8/25/21     Intervention(s)  Therapist will assign homework    teach DBT techniques including Mindfulness and Distress Tolerance.     Goal 2: Patient will increase sense of confidence and independence per client report.      Objective #A (Patient Action)    Patient will identify 3 ways in which she would like to increase sense of independence.  Status: New: 8/25/21, 11/17/21    Intervention(s)  Therapist will assign homework    teach emotional recognition/identification, teach values identification.      Objective #B  Patient will use cognitive strategies identified in therapy to identify and challenge negative self-talk / self-judgement.   Status: New: 8/25/21, 11/17/21    Intervention(s)  Therapist will assign homework    teach CBT techniques to identify and challenge cognitive distortions, teach positive affirmations    Objective #C  Patient will engage in daily practice of identifying a positive affirmation or personal success she feels proud of.  Status: New: 8/25/21, 11/17/21    Intervention(s)  Therapist will assign homework    teach CBT techniques        Patient has reviewed and agreed to the above plan.      Eunice Mixon MA, [facility]  3/16/22

## 2022-03-30 ENCOUNTER — VIRTUAL VISIT (OUTPATIENT)
Dept: PSYCHOLOGY | Facility: CLINIC | Age: 52
End: 2022-03-30
Payer: COMMERCIAL

## 2022-03-30 DIAGNOSIS — F41.1 GENERALIZED ANXIETY DISORDER: Primary | ICD-10-CM

## 2022-03-30 PROCEDURE — 90834 PSYTX W PT 45 MINUTES: CPT | Mod: 95 | Performed by: COUNSELOR

## 2022-04-18 ENCOUNTER — MYC MEDICAL ADVICE (OUTPATIENT)
Dept: FAMILY MEDICINE | Facility: CLINIC | Age: 52
End: 2022-04-18
Payer: COMMERCIAL

## 2022-04-18 DIAGNOSIS — M54.12 CERVICAL RADICULOPATHY: Primary | ICD-10-CM

## 2022-04-18 DIAGNOSIS — E78.5 HYPERLIPIDEMIA LDL GOAL <130: ICD-10-CM

## 2022-04-18 RX ORDER — ATORVASTATIN CALCIUM 20 MG/1
TABLET, FILM COATED ORAL
Qty: 90 TABLET | Refills: 0 | Status: SHIPPED | OUTPATIENT
Start: 2022-04-18 | End: 2022-06-30

## 2022-04-18 RX ORDER — ATORVASTATIN CALCIUM 20 MG/1
TABLET, FILM COATED ORAL
Qty: 30 TABLET | Refills: 0 | Status: SHIPPED | OUTPATIENT
Start: 2022-04-18 | End: 2022-04-18

## 2022-04-18 NOTE — TELEPHONE ENCOUNTER
Dr. Agrawal,   #30 tablets of atorvastatin were ordered earlier today with instruction to have fasting lab before further refills. Then this request was sent for quantity of 90.  How do you advise?  Thank you.  Rojelio Razo RN

## 2022-04-18 NOTE — TELEPHONE ENCOUNTER
Okay for 90 days but pt needs fasting labs.  Please notify her of need for fasting lab visit    Louise Agrawal DO

## 2022-04-18 NOTE — TELEPHONE ENCOUNTER
Medication is being filled for 1 time refill only due to:  Patient needs labs fasting.   Rojelio Razo RN

## 2022-04-19 NOTE — TELEPHONE ENCOUNTER
Called patient to schedule fasting labs, but was told that they are not taking this medication- atorvastatin.   Are you still wanting patient to come in for labs? Or is there anything that you would like done with this?    Thank you.  Katherin

## 2022-04-23 ENCOUNTER — HEALTH MAINTENANCE LETTER (OUTPATIENT)
Age: 52
End: 2022-04-23

## 2022-05-06 NOTE — PROGRESS NOTES
"                                           Progress Note    Patient Name: Tracee Cooper  Date: 3/30/22         Service Type: Individual      Session Start Time: 11am  Session End Time: 11:45     Session Length: 45    Session #: 38    Attendees: Client attended alone     Service Modality:  Phone Visit:     The patient has been notified of the following:      \"We have found that certain health care needs can be provided without the need for a face to face visit.  This service lets us provide the care you need with a phone conversation.       I will have full access to your [place] medical record during this entire phone call.   I will be taking notes for your medical record.      Since this is like an office visit, we will bill your insurance company for this service.       There are potential benefits and risks of telephone visits (e.g. limits to patient confidentiality) that differ from in-person visits.?  Confidentiality still applies for telephone services, and nobody will record the visit.  It is important to be in a quiet, private space that is free of distractions (including cell phone or other devices) during the visit.??      If during the course of the call I believe a telephone visit is not appropriate, you will not be charged for this service\"     Consent has been obtained for this service by care team member: Yes     Treatment Plan Last Reviewed:   3/30/22  PHQ-9 / ORVILLE-7 :         DATA  Interactive Complexity: No  Crisis: No       Progress Since Last Session (Related to Symptoms / Goals / Homework):   Symptoms: No change        Homework: Partially completed            Episode of Care Goals: Satisfactory progress - ACTION (Actively working towards change); Intervened by reinforcing change plan / affirming steps taken     Current / Ongoing Stressors and Concerns:   Ongoing: Anxiety and panic attacks while driving, history of family conflict  Current:    Discussed ongoing anxiety triggers including " driving, workplace conflict, and concern around daughter's health.      Treatment Objective(s) Addressed in This Session:    Patient will use cognitive strategies identified in therapy to identify and challenge negative self-talk / self-judgement.   Patient will identify three distraction and diversion activities and use those activities to decrease level of anxiety  .       Intervention:   CBT:   Reinforced effective skills practice. Used open-ended questions and summary to assist with insight. Coached on relaxation, grounding, and mindfulness techniques. Reflected cognitive distortion and prompted challenge.      ASSESSMENT: Current Emotional / Mental Status (status of significant symptoms):   Risk status (Self / Other harm or suicidal ideation)   Patient denies current fears or concerns for personal safety.   Patient denies current or recent suicidal ideation or behaviors.   Patientdenies current or recent homicidal ideation or behaviors.   Patient denies current or recent self injurious behavior or ideation.   Patient denies other safety concerns.   Patient reports there has been no change in risk factors since their last session.     Patient reports there has been no change in protective factors since their last session.     Recommended that patient call 911 or go to the local ED should there be a change in any of these risk factors.     Appearance:   Not Assessed    Eye Contact:   Not Assessed    Psychomotor Behavior: Not Assessed    Attitude:   Cooperative    Orientation:   All   Speech    Rate / Production: Normal/ Responsive    Volume:  Normal    Mood:    Anxious    Affect:    Appropriate    Thought Content:  Clear    Thought Form:  Coherent  Logical      Insight:    Fair      Medication Review:   No changes to current psychiatric medication(s)     Medication Compliance:   Yes     Changes in Health Issues:   None reported     Chemical Use Review:   Substance Use: Chemical use reviewed, no active concerns  identified      Tobacco Use: No current tobacco use.      Diagnosis:  1. Generalized anxiety disorder         Collateral Reports Completed:   Not Applicable      PLAN: (Patient Tasks / Therapist Tasks / Other)  HOMEWORK:    Look into supports for parents of children with disordered eating.         Eunice Mixon MA, [facility]                                                          ______________________________________________________________________    Treatment Plan    Patient's Name: Tracee Cooper  YOB: 1970    Date: 3/30/22    Diagnoses:  300.02 (F41.1) Generalized Anxiety Disorder   (f41.0) Panic disorder without agoraphobia  Rule out Posttraumatic Stress Disorder  Psychosocial & Contextual Factors: Past trauma, children's mental health concerns, work stressors  WHODAS: Completed    Referral / Collaboration:  Referral to another professional/service is not indicated at this time.    Anticipated number of session or this episode of care: 20      MeasurableTreatment Goal(s) related to diagnosis / functional impairment(s)  Goal 1: Patient will decrease anxiety levels as evidence by ORVILLE-7 scores, and client report.    I will know I've met my goal when I have less panic attacks, when I can drive to a new place without feeling anxious or panicked.      Objective #A (Patient Action)    Patient will identify 3 initial signs or symptoms of anxiety.  Status: Continued - Date(s): 12/16/19, 11/2/20, 2/4/21, 5/17/21, 11/17/21,  3/30/22    Intervention(s)  Therapist will assign homework    teach emotional recognition/identification.      Objective #B  Patient will use cognitive strategies identified in therapy to challenge anxious thoughts.  Status: Completed - Date: 8/25/21     Intervention(s)  Therapist will assign homework    teach CBT techniques to identify and challenge cognitive distortions.    Objective #C  Patient will identify three distraction and diversion activities and use those activities to  decrease level of anxiety  .  Status: Completed - Date: 8/25/21     Intervention(s)  Therapist will assign homework    teach DBT techniques including Mindfulness and Distress Tolerance.     Goal 2: Patient will increase sense of confidence and independence per client report.      Objective #A (Patient Action)    Patient will identify 3 ways in which she would like to increase sense of independence.  Status: New: 8/25/21, 11/17/21,  3/30/22    Intervention(s)  Therapist will assign homework    teach emotional recognition/identification, teach values identification.      Objective #B  Patient will use cognitive strategies identified in therapy to identify and challenge negative self-talk / self-judgement.   Status: New: 8/25/21, 11/17/21,  3/30/22    Intervention(s)  Therapist will assign homework    teach CBT techniques to identify and challenge cognitive distortions, teach positive affirmations    Objective #C  Patient will engage in daily practice of identifying a positive affirmation or personal success she feels proud of.  Status: New: 8/25/21, 11/17/21,  3/30/22    Intervention(s)  Therapist will assign homework    teach CBT techniques        Patient has reviewed and agreed to the above plan.      E[facility]ice Mixon MA, [facility]  3/30/22

## 2022-05-18 ENCOUNTER — VIRTUAL VISIT (OUTPATIENT)
Dept: PSYCHOLOGY | Facility: CLINIC | Age: 52
End: 2022-05-18
Payer: COMMERCIAL

## 2022-05-18 DIAGNOSIS — F41.0 PANIC DISORDER WITHOUT AGORAPHOBIA: ICD-10-CM

## 2022-05-18 DIAGNOSIS — F41.1 GENERALIZED ANXIETY DISORDER: Primary | ICD-10-CM

## 2022-05-18 PROCEDURE — 90834 PSYTX W PT 45 MINUTES: CPT | Mod: 95 | Performed by: COUNSELOR

## 2022-05-18 NOTE — PROGRESS NOTES
"                                           Progress Note    Patient Name: Tracee Cooper  Date: 5/18/22         Service Type: Individual      Session Start Time: 4pm  Session End Time: 4:45     Session Length: 45    Session #: 39    Attendees: Client attended alone     Service Modality:  Phone Visit:     The patient has been notified of the following:      \"We have found that certain health care needs can be provided without the need for a face to face visit.  This service lets us provide the care you need with a phone conversation.       I will have full access to your [place] medical record during this entire phone call.   I will be taking notes for your medical record.      Since this is like an office visit, we will bill your insurance company for this service.       There are potential benefits and risks of telephone visits (e.g. limits to patient confidentiality) that differ from in-person visits.?  Confidentiality still applies for telephone services, and nobody will record the visit.  It is important to be in a quiet, private space that is free of distractions (including cell phone or other devices) during the visit.??      If during the course of the call I believe a telephone visit is not appropriate, you will not be charged for this service\"     Consent has been obtained for this service by care team member: Yes     Treatment Plan Last Reviewed:   3/30/22  PHQ-9 / ORVILLE-7 :         DATA  Interactive Complexity: No  Crisis: No       Progress Since Last Session (Related to Symptoms / Goals / Homework):   Symptoms: No change         Homework: Partially completed            Episode of Care Goals: Satisfactory progress - ACTION (Actively working towards change); Intervened by reinforcing change plan / affirming steps taken     Current / Ongoing Stressors and Concerns:   Ongoing: Anxiety and panic attacks while driving, history of family conflict  Current: Client discussed anxiety triggers, including driving, " "work stress, and family conflict. Expresses ongoing distress around daughter's weight and her level of self-care.     Treatment Objective(s) Addressed in This Session:    Patient will use cognitive strategies identified in therapy to identify and challenge negative self-talk / self-judgement.          Intervention:   CBT:   Reinforced effective skills practice. Used open-ended questions and summary to assist with insight. Reflected cognitive distortion and prompted challenge. Discussed psychoeducation resources to assist with management around expectations of daughter, ways in which to give appropriate support, and approaches such as \"health at every size\".      ASSESSMENT: Current Emotional / Mental Status (status of significant symptoms):   Risk status (Self / Other harm or suicidal ideation)   Patient denies current fears or concerns for personal safety.   Patient denies current or recent suicidal ideation or behaviors.   Patientdenies current or recent homicidal ideation or behaviors.   Patient denies current or recent self injurious behavior or ideation.   Patient denies other safety concerns.   Patient reports there has been no change in risk factors since their last session.     Patient reports there has been no change in protective factors since their last session.     Recommended that patient call 911 or go to the local ED should there be a change in any of these risk factors.     Appearance:   Not Assessed    Eye Contact:   Not Assessed    Psychomotor Behavior: Not Assessed    Attitude:   Cooperative    Orientation:   All   Speech    Rate / Production: Normal/ Responsive    Volume:  Normal    Mood:    Anxious    Affect:    Appropriate    Thought Content:  Clear    Thought Form:  Coherent  Logical      Insight:    Fair      Medication Review:   No changes to current psychiatric medication(s)     Medication Compliance:   Yes     Changes in Health Issues:   None reported     Chemical Use Review:   Substance " Use: Chemical use reviewed, no active concerns identified      Tobacco Use: No current tobacco use.      Diagnosis:  1. Generalized anxiety disorder    2. Panic disorder without agoraphobia         Collateral Reports Completed:   Not Applicable      PLAN: (Patient Tasks / Therapist Tasks / Other)  HOMEWORK:    Look into supports for parents of children with disordered eating.          Eunice Mixon MA, [facility]                                                          ______________________________________________________________________    Treatment Plan    Patient's Name: Tracee Cooper  YOB: 1970    Date: 3/30/22    Diagnoses:  300.02 (F41.1) Generalized Anxiety Disorder   (f41.0) Panic disorder without agoraphobia  Rule out Posttraumatic Stress Disorder  Psychosocial & Contextual Factors: Past trauma, children's mental health concerns, work stressors  WHODAS: Completed    Referral / Collaboration:  Referral to another professional/service is not indicated at this time.    Anticipated number of session or this episode of care: 20      MeasurableTreatment Goal(s) related to diagnosis / functional impairment(s)  Goal 1: Patient will decrease anxiety levels as evidence by ORVILLE-7 scores, and client report.    I will know I've met my goal when I have less panic attacks, when I can drive to a new place without feeling anxious or panicked.      Objective #A (Patient Action)    Patient will identify 3 initial signs or symptoms of anxiety.  Status: Continued - Date(s): 12/16/19, 11/2/20, 2/4/21, 5/17/21, 11/17/21,  3/30/22    Intervention(s)  Therapist will assign homework    teach emotional recognition/identification.      Objective #B  Patient will use cognitive strategies identified in therapy to challenge anxious thoughts.  Status: Completed - Date: 8/25/21     Intervention(s)  Therapist will assign homework    teach CBT techniques to identify and challenge cognitive distortions.    Objective #C  Patient  will identify three distraction and diversion activities and use those activities to decrease level of anxiety  .  Status: Completed - Date: 8/25/21     Intervention(s)  Therapist will assign homework    teach DBT techniques including Mindfulness and Distress Tolerance.     Goal 2: Patient will increase sense of confidence and independence per client report.      Objective #A (Patient Action)    Patient will identify 3 ways in which she would like to increase sense of independence.  Status: New: 8/25/21, 11/17/21,  3/30/22    Intervention(s)  Therapist will assign homework    teach emotional recognition/identification, teach values identification.      Objective #B  Patient will use cognitive strategies identified in therapy to identify and challenge negative self-talk / self-judgement.   Status: New: 8/25/21, 11/17/21,  3/30/22    Intervention(s)  Therapist will assign homework    teach CBT techniques to identify and challenge cognitive distortions, teach positive affirmations    Objective #C  Patient will engage in daily practice of identifying a positive affirmation or personal success she feels proud of.  Status: New: 8/25/21, 11/17/21,  3/30/22    Intervention(s)  Therapist will assign homework    teach CBT techniques        Patient has reviewed and agreed to the above plan.      Eunice Mixon MA, [facility]  5/18/22

## 2022-05-31 ENCOUNTER — MYC REFILL (OUTPATIENT)
Dept: FAMILY MEDICINE | Facility: CLINIC | Age: 52
End: 2022-05-31
Payer: COMMERCIAL

## 2022-05-31 DIAGNOSIS — F41.1 GENERALIZED ANXIETY DISORDER: ICD-10-CM

## 2022-05-31 NOTE — TELEPHONE ENCOUNTER
Routing refill request to provider for review/approval because:  Drug not on the [facility] refill protocol   Thank you.  Rojelio Razo RN

## 2022-06-02 ENCOUNTER — VIRTUAL VISIT (OUTPATIENT)
Dept: PSYCHOLOGY | Facility: CLINIC | Age: 52
End: 2022-06-02
Payer: COMMERCIAL

## 2022-06-02 DIAGNOSIS — F41.1 GENERALIZED ANXIETY DISORDER: Primary | ICD-10-CM

## 2022-06-02 DIAGNOSIS — F41.0 PANIC DISORDER WITHOUT AGORAPHOBIA: ICD-10-CM

## 2022-06-02 PROCEDURE — 90834 PSYTX W PT 45 MINUTES: CPT | Mod: 95 | Performed by: COUNSELOR

## 2022-06-02 RX ORDER — ALPRAZOLAM 1 MG
TABLET ORAL
Qty: 20 TABLET | Refills: 0 | Status: SHIPPED | OUTPATIENT
Start: 2022-06-02 | End: 2022-08-09

## 2022-06-02 NOTE — PROGRESS NOTES
"                                           Progress Note    Patient Name: Tracee Cooper  Date: 6/2/22         Service Type: Individual      Session Start Time: 10:30am  Session End Time: 11:15     Session Length: 45    Session #: 40    Attendees: Client attended alone     Service Modality:  Phone Visit:     The patient has been notified of the following:      \"We have found that certain health care needs can be provided without the need for a face to face visit.  This service lets us provide the care you need with a phone conversation.       I will have full access to your [place] medical record during this entire phone call.   I will be taking notes for your medical record.      Since this is like an office visit, we will bill your insurance company for this service.       There are potential benefits and risks of telephone visits (e.g. limits to patient confidentiality) that differ from in-person visits.?  Confidentiality still applies for telephone services, and nobody will record the visit.  It is important to be in a quiet, private space that is free of distractions (including cell phone or other devices) during the visit.??      If during the course of the call I believe a telephone visit is not appropriate, you will not be charged for this service\"     Consent has been obtained for this service by care team member: Yes     Treatment Plan Last Reviewed:   3/30/22   PHQ-9 / ORVILLE-7 :         DATA  Interactive Complexity: No  Crisis: No       Progress Since Last Session (Related to Symptoms / Goals / Homework):   Symptoms: No change        Homework: Partially completed          Episode of Care Goals: Satisfactory progress - ACTION (Actively working towards change); Intervened by reinforcing change plan / affirming steps taken     Current / Ongoing Stressors and Concerns:   Ongoing: Anxiety and panic attacks while driving, history of family conflict  Current:  Client discussed interpersonal challenges and " conflict between extended family. Struggles to identify appropriate boundaries in certain situations due to challenging family dynamics.      Treatment Objective(s) Addressed in This Session:    Patient will use cognitive strategies identified in therapy to identify and challenge negative self-talk / self-judgement.   Patient will identify three distraction and diversion activities and use those activities to decrease level of anxiety.       Intervention:   CBT:   Reinforced effective skills practice. Used open-ended questions and summary to assist with insight. Coached on boundary identification and assertiveness practice. Reflected cognitive distortion and prompted challenge.        ASSESSMENT: Current Emotional / Mental Status (status of significant symptoms):   Risk status (Self / Other harm or suicidal ideation)   Patient denies current fears or concerns for personal safety.   Patient denies current or recent suicidal ideation or behaviors.   Patientdenies current or recent homicidal ideation or behaviors.   Patient denies current or recent self injurious behavior or ideation.   Patient denies other safety concerns.   Patient reports there has been no change in risk factors since their last session.     Patient reports there has been no change in protective factors since their last session.     Recommended that patient call 911 or go to the local ED should there be a change in any of these risk factors.     Appearance:   Not Assessed    Eye Contact:   Not Assessed    Psychomotor Behavior: Not Assessed    Attitude:   Cooperative    Orientation:   All   Speech    Rate / Production: Normal/ Responsive    Volume:  Normal    Mood:    Anxious    Affect:    Appropriate    Thought Content:  Clear    Thought Form:  Coherent  Logical      Insight:    Fair      Medication Review:   No changes to current psychiatric medication(s)     Medication Compliance:   Yes     Changes in Health Issues:   None reported     Chemical  Use Review:   Substance Use: Chemical use reviewed, no active concerns identified      Tobacco Use: No current tobacco use.      Diagnosis:  1. Generalized anxiety disorder    2. Panic disorder without agoraphobia         Collateral Reports Completed:   Not Applicable      PLAN: (Patient Tasks / Therapist Tasks / Other)  HOMEWORK:  Mindfulness around boundaries with in-laws. Take effective breaks as needed. Affirmations during difficult events.       Eunice Mixon MA, [facility]                                                          ______________________________________________________________________    Treatment Plan    Patient's Name: Tracee Cooper  YOB: 1970    Date: 3/30/22    Diagnoses:  300.02 (F41.1) Generalized Anxiety Disorder   (f41.0) Panic disorder without agoraphobia  Rule out Posttraumatic Stress Disorder  Psychosocial & Contextual Factors: Past trauma, children's mental health concerns, work stressors  WHODAS: Completed    Referral / Collaboration:  Referral to another professional/service is not indicated at this time.    Anticipated number of session or this episode of care: 20      MeasurableTreatment Goal(s) related to diagnosis / functional impairment(s)  Goal 1: Patient will decrease anxiety levels as evidence by ORVILLE-7 scores, and client report.    I will know I've met my goal when I have less panic attacks, when I can drive to a new place without feeling anxious or panicked.      Objective #A (Patient Action)    Patient will identify 3 initial signs or symptoms of anxiety.  Status: Continued - Date(s): 12/16/19, 11/2/20, 2/4/21, 5/17/21, 11/17/21,  3/30/22    Intervention(s)  Therapist will assign homework    teach emotional recognition/identification.      Objective #B  Patient will use cognitive strategies identified in therapy to challenge anxious thoughts.  Status: Completed - Date: 8/25/21     Intervention(s)  Therapist will assign homework    teach CBT techniques to  identify and challenge cognitive distortions.    Objective #C  Patient will identify three distraction and diversion activities and use those activities to decrease level of anxiety  .  Status: Completed - Date: 8/25/21     Intervention(s)  Therapist will assign homework    teach DBT techniques including Mindfulness and Distress Tolerance.     Goal 2: Patient will increase sense of confidence and independence per client report.      Objective #A (Patient Action)    Patient will identify 3 ways in which she would like to increase sense of independence.  Status: New: 8/25/21, 11/17/21,  3/30/22    Intervention(s)  Therapist will assign homework    teach emotional recognition/identification, teach values identification.      Objective #B  Patient will use cognitive strategies identified in therapy to identify and challenge negative self-talk / self-judgement.   Status: New: 8/25/21, 11/17/21,  3/30/22    Intervention(s)  Therapist will assign homework    teach CBT techniques to identify and challenge cognitive distortions, teach positive affirmations    Objective #C  Patient will engage in daily practice of identifying a positive affirmation or personal success she feels proud of.  Status: New: 8/25/21, 11/17/21,  3/30/22    Intervention(s)  Therapist will assign homework    teach CBT techniques        Patient has reviewed and agreed to the above plan.      Eunice Mixon MA, [facility]  6/2/22

## 2022-06-03 ENCOUNTER — RADIOLOGY INJECTION OFFICE VISIT (OUTPATIENT)
Dept: PALLIATIVE MEDICINE | Facility: CLINIC | Age: 52
End: 2022-06-03
Attending: FAMILY MEDICINE
Payer: COMMERCIAL

## 2022-06-03 VITALS — HEART RATE: 93 BPM | DIASTOLIC BLOOD PRESSURE: 90 MMHG | SYSTOLIC BLOOD PRESSURE: 149 MMHG

## 2022-06-03 DIAGNOSIS — M54.12 CERVICAL RADICULOPATHY: ICD-10-CM

## 2022-06-03 PROCEDURE — 62321 NJX INTERLAMINAR CRV/THRC: CPT | Performed by: PAIN MEDICINE

## 2022-06-03 RX ORDER — DEXAMETHASONE SODIUM PHOSPHATE 10 MG/ML
10 INJECTION, SOLUTION INTRAMUSCULAR; INTRAVENOUS ONCE
Status: COMPLETED | OUTPATIENT
Start: 2022-06-03 | End: 2022-06-03

## 2022-06-03 RX ADMIN — DEXAMETHASONE SODIUM PHOSPHATE 10 MG: 10 INJECTION, SOLUTION INTRAMUSCULAR; INTRAVENOUS at 13:22

## 2022-06-03 ASSESSMENT — PAIN SCALES - GENERAL
PAINLEVEL: MODERATE PAIN (4)
PAINLEVEL: SEVERE PAIN (7)
PAINLEVEL: SEVERE PAIN (7)

## 2022-06-03 NOTE — PATIENT INSTRUCTIONS
[facility]   Procedure Discharge Instructions    Today you saw: Dr. Payam Galo,    You had an: Repeat C7/T1 Interlaminar Epidural steroid injection       Medications used:  Lidocaine   Bupivacaine  Omnipaque  Kenalog   Normal saline          Be cautious when walking. Numbness and/or weakness in the lower extremities may occur for up to 6-8 hours after the procedure due to effect of the local anesthetic  Do not drive for 6 hours. The effect of the local anesthetic could slow your reflexes.   You may resume your regular activities after 24 hours  Avoid strenuous activity for the first 24 hours  You may shower, however avoid swimming, tub baths or hot tubs for 24 hours following your procedure  You may have a mild to moderate increase in pain for several days following the injection.  It may take up to 14 days for the steroid medication to start working although you may feel the effect as early as a few days after the procedure.     You may use ice packs for 10-15 minutes, 3 to 4 times a day at the injection site for comfort  Do not use heat to painful areas for 6 to 8 hours. This will give the local anesthetic time to wear off and prevent you from accidentally burning your skin.   Unless you have been directed to avoid the use of anti-inflammatory medications (NSAIDS), you may use medications such as ibuprofen, Aleve or Tylenol for pain control if needed.   If you were fasting, you may resume your normal diet and medications after the procedure  If you have diabetes, check your blood sugar more frequently than usual as your blood sugar may be higher than normal for 10-14 days following a steroid injection. Contact your doctor who manages your diabetes if your blood sugar is higher than usual  Possible side effects of steroids that you may experience include flushing, elevated blood pressure, increased appetite, mild headaches and restlessness.  All of these symptoms will get better  with time.  If you experience any of the following, call the Pain Clinic during work hours (Mon-Friday 8-4:30 pm) at [phone] or the Provider Line after hours at [phone]:  -Fever over 100 degree F  -Swelling, bleeding, redness, drainage, warmth at the injection site  -Progressive weakness or numbness in your arms  -Loss of bowel or bladder function  -Unusual headache that is not relieved by Tylenol or other pain reliever  -Unusual new onset of pain that is not improving

## 2022-06-03 NOTE — NURSING NOTE
Discharge Information    IV Discontiued Time:  NA    Amount of Fluid Infused:  NA    Discharge Criteria = When patient returns to baseline or as per MD order    Consciousness:  Pt is fully awake    Circulation:  BP +/- 20% of pre-procedure level    Respiration:  Patient is able to breathe deeply    O2 Sat:  Patient is able to maintain O2 Sat >92% on room air    Activity:  Moves 4 extremities on command    Ambulation:  Patient is able to stand and walk or stand and pivot into wheelchair    Dressing:  Clean/dry or No Dressing    Notes:   Discharge instructions and AVS given to patient    Patient meets criteria for discharge?  YES    Admitted to PCU?  No    Responsible adult present to accompany patient home?  Yes    Signature/Title:    Carin iWlliam RN  RN Care Coordinator  [facility]

## 2022-06-03 NOTE — NURSING NOTE
Pre-procedure Intake  If YES to any questions or NO to having a   Please complete laminated checklist and leave on the computer keyboard for Provider, verbally inform provider if able.    For SCS Trial, RFA's or any sedation procedure:  Have you been fasting? NA    If yes, for how long?     Are you taking any any blood thinners such as Coumadin, Warfarin, Jantoven, Pradaxa Xarelto, Eliquis, Edoxaban, Enoxaparin, Lovenox, Heparin, Arixtra, Fondaparinux, or Fragmin? OR Antiplatelet medication such as Plavix, Brilinta, or Effient?   No     If yes, when did you take your last dose?     Do you take aspirin?  No    If cervical procedure, have you held aspirin for 6 days?      Do you have any allergies to contrast dye, iodine, steroid and/or numbing medications?  NO    Are you currently taking antibiotics or have an active infection?  NO    Have you had a fever/elevated temperature within the past week? NO    Are you currently taking oral steroids? NO    Do you have a ? Yes     Are you pregnant or breastfeeding?  NO    Have you received the COVID-19 vaccine? Yes    If yes, was it your 1st, 2nd or only dose needed?     Date of most recent vaccine: 1/11/22    Notify provider and RNs if systolic BP >170, diastolic BP >100, P >100 or O2 sats < 90%

## 2022-06-03 NOTE — PROGRESS NOTES
[facility] - Procedure Note    Date of Visit: 6/3/2022    Procedure performed: C7-T1 interlaminar epidural steroid injection with fluoroscopic guidance  Diagnosis: Cervical spondylosis; Cervical radiculitis/radiculopathy  : Payam Galo MD   Anesthesia: none    Indications: Tracee Cooper is a 49 year old female who is seen  for cervical epidural steroid injection. The patient describes bilateral neck pain right greater than left. The patient has been exhibiting symptoms consistent with cervical intraspinal inflammation and radiculopathy. Symptoms have been persistent, disabling, and intermittently severe. The patient reports minimal improvement with conservative treatment, including meds/PT/prior injections with benefit.    Cervical MRI Level by level:     C2-C3: There is no spinal canal or neural foraminal stenosis at this  level.     C3-C4: There is facet arthropathy and uncinate arthropathy  bilaterally. There is mild left foraminal narrowing but no spinal  canal or right foraminal stenosis.     C4-C5: There is facet arthropathy and uncinate arthropathy bilaterally  resulting in mild left foraminal narrowing. There is no right  foraminal or spinal canal narrowing.     C5-C6: There is facet arthropathy bilaterally, uncinate hypertrophy  bilaterally and a posterior broad-based disc-osteophyte complex with a  superimposed small posterior broad-based disc herniation (protrusion).  These findings result in moderate spinal canal stenosis with mild  flattening of the left anterior surface of the cervical spinal cord,  moderate left foraminal stenosis and mild right foraminal narrowing.     C6-C7: There is facet arthropathy bilaterally, uncinate hypertrophy  bilaterally and a posterior broad-based disc-osteophyte complex with a  superimposed small posterior broad-based disc herniation (protrusion).  These findings result in mild spinal canal narrowing and mild left  foraminal  narrowing but no right foraminal stenosis.     C7-T1: There is no spinal canal or neural foraminal stenosis at this  level.                                                                      IMPRESSION: Degenerative changes of the cervical spine as described  above.Allergies:    No Known Allergies     Vitals:  BP (!) 161/100   Pulse 86     Review of Systems: The patient denies recent fever, chills, illness, use of antibiotics or anticoagulants. All other 10-point review of systems negative.     Procedure: The procedure and risks were explained, and informed written consent was obtained from the patient. Risks include but are not limited to: infection, bleeding, increased pain, and damage to soft tissue, nerve, muscle, and vasculature structures. After getting informed consent, patient was brought into the procedure suite and was placed in a prone position on the procedure table. A Pause for the Cause was performed. Patient was prepped and draped in sterile fashion.     The C7-T1 interspace was identified with use of fluoroscopy in AP view. A 25-gauge, 1.5 inch needle was used to anesthetize the skin and subcutaneous tissue entry site with a total of 2 ml of 1% lidocaine. Under fluoroscopic visualization, a 22-gauge, 3.5 inch Tuohy epidural needle was slowly advanced towards the epidural space a few millimeters right of midline. The latter part of the needle advancement was guided with fluoroscopy in the lateral view. The epidural space was identified using loss of resistance technique. After negative aspiration for heme and cerebrospinal fluid, a total of 1 mL of Omnipaque was injected to confirm needle placement. 9 mL of contrast was wasted. Epidurogram confirmed spread within the posterior epidural space. 2 ml of 10mg/ml of dexamethasone and 1 ml of preservative free 0.25% bupivacaine was injected. The needle was removed.  Images were saved to PACS.    The patient tolerated the procedure well, and there was no  evidence of procedural complications. No new sensory or motor deficits were noted following the procedure. The patient was stable and able to ambulate on discharge home. Post-procedure instructions were provided.     Pre-procedure pain score: 5/10 in the neck, 5/10 in the arm  Post-procedure pain score: 1/10 in the neck, 1/10 in the arm    Assessment/Plan: Tracee Cooper is a 49 year old female s/p cervical interlaminar epidural steroid injection today for cervical spondylosis and radiculitis/radiculopathy.     1. Following today's procedure, the patient was advised to contact the [facility] for any of the following:   Fever, chills, or night sweats   New onset of pain, numbness, or weakness   Any questions/concerns regarding the procedure  If unable to contact the Pain Center, the patient was instructed to go to a local Emergency Room for any complications.  3. Follow-up with referring provider in 2 weeks for post-procedure evaluation.    GHASSAN Hendrickson

## 2022-06-14 ENCOUNTER — MYC MEDICAL ADVICE (OUTPATIENT)
Dept: FAMILY MEDICINE | Facility: CLINIC | Age: 52
End: 2022-06-14
Payer: COMMERCIAL

## 2022-06-14 DIAGNOSIS — M54.12 CERVICAL RADICULOPATHY: Primary | ICD-10-CM

## 2022-06-14 NOTE — TELEPHONE ENCOUNTER
Referral for spine specialist placed (which is a referral at that is used when patient is having spine symptoms).  Someone should contact her to help her schedule.    [facility] will call you to coordinate your care as prescribed by your provider. If you don't hear from a representative within 2 business days, please call [phone].    Thanks for the triage!    EB

## 2022-06-14 NOTE — TELEPHONE ENCOUNTER
"Jennie sent MyChart on this encounter  requesting a referral to Ortho. She prefers the [place] location.   Jennie reports that there was improvement for one day after injection on 6/3/22 (see that encounter) and then next day returned to the same pain level as it was before.  She reports Pain is in same location:  left arm,neck, and shoulder. Also has ongoing numbness and tingling.   Occasional radiates to give headache.  Denies fever.  She is able to touch chin to chest \"but it hurts.\"  Saw Dr. Chan in [place] in 2019 and she said at that time the injection he gave did give relief.   How do you advise ortho referral request?  Thank you  Rojelio Razo, RN        "

## 2022-06-29 DIAGNOSIS — E78.5 HYPERLIPIDEMIA LDL GOAL <130: ICD-10-CM

## 2022-06-29 NOTE — TELEPHONE ENCOUNTER
Routing refill request to provider for review/approval because:  Labs not current:  Due for Lipid re-check from draw on 1/2022    Marcio Rodrigues RN

## 2022-06-30 RX ORDER — ATORVASTATIN CALCIUM 20 MG/1
TABLET, FILM COATED ORAL
Qty: 90 TABLET | Refills: 0 | Status: SHIPPED | OUTPATIENT
Start: 2022-06-30 | End: 2022-07-26

## 2022-07-07 ENCOUNTER — OFFICE VISIT (OUTPATIENT)
Dept: NEUROSURGERY | Facility: CLINIC | Age: 52
End: 2022-07-07
Attending: FAMILY MEDICINE
Payer: COMMERCIAL

## 2022-07-07 VITALS — HEART RATE: 101 BPM | OXYGEN SATURATION: 100 % | DIASTOLIC BLOOD PRESSURE: 118 MMHG | SYSTOLIC BLOOD PRESSURE: 169 MMHG

## 2022-07-07 DIAGNOSIS — M54.12 CERVICAL RADICULOPATHY: ICD-10-CM

## 2022-07-07 PROCEDURE — 99203 OFFICE O/P NEW LOW 30 MIN: CPT | Performed by: NURSE PRACTITIONER

## 2022-07-07 ASSESSMENT — PAIN SCALES - GENERAL: PAINLEVEL: MODERATE PAIN (5)

## 2022-07-07 NOTE — PROGRESS NOTES
Dr. Fred Bazan   [facility] Neurosurgery Clinic Visit      CC: neck pain, left arm pain     Primary Care Provider: Louise Agrawal    Reason For Visit:   I was asked by Dr. Ludn to consult on the patient for: cervical radiculopathy      HPI: Tracee Cooper is a 52 year old female who presents for evaluation of acute on chronic neck and left arm pain. She was previously seen with our clinic in 2019 for the same symptoms. She has tried 5 injections from  - . Most recent C7-T1 APT on 6/3/22 did not provide relief. Today, patient reports continued neck pain radiating to left arm with new weakness and paresthesias in left arm and hand that started about 6 months ago. Denies trauma or injuries. Pain is worsened with sitting, bending, twisting, and lifting. Patient has tried PT in the past, but nothing recently. She takes OTC pain medications and uses ice/heat packs for pain control. Denies any dexterity issues, falls, foot drop, saddle anesthesia, or bladder/bowel incontinence.     Current pain: 5/10   At worst: 8/10    Past Medical History:   Diagnosis Date     INFERTILITY 2006 - Clomid trial at 50mg and increase to 100 if not ovulating by predictor.  Discussed clotting, PMS, risks etc.  Referral to fertility specialist in meantime.     Other malignant neoplasm of skin, site unspecified      Premenstrual tension syndromes 3/29/2005       Past Medical History reviewed with patient during visit.    Past Surgical History:   Procedure Laterality Date     C/SECTION, LOW TRANSVERSE      , Low Transverse     D & C  3/8/06    Missed AB at 7 weeks     DILATION AND CURETTAGE, HYSTEROSCOPY, ABLATE ENDOMETRIUM, COMBINED N/A 11/3/2016    Procedure: COMBINED DILATION AND CURETTAGE, HYSTEROSCOPY, ABLATE ENDOMETRIUM;  Surgeon: Jane Marquez MD;  Location: [facility] OR     HYSTEROSCOPY  2006    F/U US Abnormalit s/p d&c     SURGICAL HISTORY OF -   2002     Malignant melanoma of the right arm.     Past Surgical History reviewed with patient during visit.    Current Outpatient Medications   Medication     ALPRAZolam (XANAX) 1 MG tablet     amLODIPine (NORVASC) 10 MG tablet     atorvastatin (LIPITOR) 20 MG tablet     venlafaxine (EFFEXOR-XR) 150 MG 24 hr capsule     No current facility-administered medications for this visit.       No Known Allergies    Social History     Socioeconomic History     Marital status:      Number of children: 1   Occupational History     Employer: SELF   Tobacco Use     Smoking status: Former Smoker     Types: Cigarettes     Quit date: 2006     Years since quittin.4     Smokeless tobacco: Never Used   Substance and Sexual Activity     Alcohol use: Yes     Comment: occasionally     Drug use: No     Sexual activity: Yes     Partners: Male     Birth control/protection: Surgical     Comment: vasectomy   Other Topics Concern     Parent/sibling w/ CABG, MI or angioplasty before 65F 55M? No       Family History   Problem Relation Age of Onset     Prostate Cancer Father      Thyroid Disease Mother         Hypothyroid     Hypertension Mother      C.A.D. Maternal Grandfather      Cerebrovascular Disease Maternal Grandmother      Breast Cancer Maternal Aunt      Diabetes Paternal Grandmother         adult onset     Cancer Paternal Grandfather         stomach     Cancer - colorectal No family hx of        ROS: 10 point ROS neg other than the symptoms noted above in the HPI.    Vital Signs: BP (!) 169/118   Pulse 101   SpO2 100%     Physical Examination:  Constitutional:  Alert, well nourished, NAD.  HEENT: Normocephalic, atraumatic.   Pulmonary:  Without shortness of breath, normal effort.   Cardiovascular:  No pitting edema of BLE.      Neurological:  Awake  Alert  Oriented x 3  Speech clear  Cranial nerves II - XII grossly intact  PERRL  EOMI  Motor exam:  Shoulder Abduction  Right:  5/5   Left:  5/5  Biceps                       Right:  5/5   Left:  4+/5  Triceps                     Right:  5/5   Left:  4+/5  Wrist Extensors        Right:  5/5   Left:  5/5  Wrist Flexors            Right:  5/5   Left:  5/5  Intrinsics                   Right:  5/5   Left:  4/5    Iliopsoas  (hip flexion)               Right: 5/5  Left:  5/5  Quadriceps  (knee extension)       Right:  5/5  Left:  5/5  Hamstrings  (knee flexion)            Right:  5/5  Left:  5/5  Gastroc Soleus  (PF)                          Right:  5/5  Left:  5/5  Tibialis Ant  (DF)                          Right:  5/5  Left:  5/5  EHL                          Right:  5/5  Left:  5/5         Sensation normal to BUE and BLE     Reflexes are 2+ in the patellar and Achilles. There is no clonus.     Reflexes are 2+ in the brachial radialis and triceps. Negative Krystle sign bilaterally.    Musculoskeletal:  Gait: Able to stand from a seated position. Normal non-antalgic, non-myelopathic gait.    Cervical examination reveals pain with range of motion.  No tenderness of the spine or paraspinous muscles.    Imaging:   Cervical spine MRI from 2019 reveals disc osteophyte complex and moderate left foraminal stenosis at C5-6 and C6-7.    Assessment/Plan:   Cervical radiculopathy    52 year old female who presents for evaluation of chronic neck pain radiating to left arm with new weakness and paresthesias in left arm and hand. She has tried various conservative measures over the years. Most recent C7-T1 PAT on 6/3/22 did not provide relief. Cervical spine MRI from 2019 reveals disc osteophyte complex and moderate left foraminal stenosis at C5-6 and C6-7. We discussed options at this time. Given worsening symptoms, will obtain an updated cervical spine MRI. Patient would also like to try a course of PT again. Will call patient with MRI results and next steps.    Advised patient to call our clinic with any questions or concerns. Discussed red flag symptoms and advised to seek medical attention if  these develop. Patient voiced understanding and agreement.      Kymberly Cuevas CNP  [facility] Neurosurgery  [address] [place] [address]  Tel [phone]  Pager [phone]

## 2022-07-07 NOTE — LETTER
2022         RE: Tracee Cooper  [address]  [place] [address]        Dear Colleague,    Thank you for referring your patient, Tracee Cooper, to the [facility]. Please see a copy of my visit note below.    Dr. Fred Bazan   [facility] Neurosurgery Clinic Visit      CC: neck pain, left arm pain     Primary Care Provider: Louise Agrawal    Reason For Visit:   I was asked by Dr. Lund to consult on the patient for: cervical radiculopathy      HPI: Tracee Cooper is a 52 year old female who presents for evaluation of acute on chronic neck and left arm pain. She was previously seen with our clinic in 2019 for the same symptoms. She has tried 5 injections from  - . Most recent C7-T1 PAT on 6/3/22 did not provide relief. Today, patient reports continued neck pain radiating to left arm with new weakness and paresthesias in left arm and hand that started about 6 months ago. Denies trauma or injuries. Pain is worsened with sitting, bending, twisting, and lifting. Patient has tried PT in the past, but nothing recently. She takes OTC pain medications and uses ice/heat packs for pain control. Denies any dexterity issues, falls, foot drop, saddle anesthesia, or bladder/bowel incontinence.     Current pain: 5/10   At worst: 8/10    Past Medical History:   Diagnosis Date     INFERTILITY 2006 - Clomid trial at 50mg and increase to 100 if not ovulating by predictor.  Discussed clotting, PMS, risks etc.  Referral to fertility specialist in meantime.     Other malignant neoplasm of skin, site unspecified      Premenstrual tension syndromes 3/29/2005       Past Medical History reviewed with patient during visit.    Past Surgical History:   Procedure Laterality Date     C/SECTION, LOW TRANSVERSE      , Low Transverse     D & C  3/8/06    Missed AB at 7 weeks     DILATION AND CURETTAGE, HYSTEROSCOPY, ABLATE  ENDOMETRIUM, COMBINED N/A 11/3/2016    Procedure: COMBINED DILATION AND CURETTAGE, HYSTEROSCOPY, ABLATE ENDOMETRIUM;  Surgeon: Jane Marquez MD;  Location: [facility] OR     HYSTEROSCOPY  2006    F/U US Abnormalit s/p d&c     SURGICAL HISTORY OF -   2002    Malignant melanoma of the right arm.     Past Surgical History reviewed with patient during visit.    Current Outpatient Medications   Medication     ALPRAZolam (XANAX) 1 MG tablet     amLODIPine (NORVASC) 10 MG tablet     atorvastatin (LIPITOR) 20 MG tablet     venlafaxine (EFFEXOR-XR) 150 MG 24 hr capsule     No current facility-administered medications for this visit.       No Known Allergies    Social History     Socioeconomic History     Marital status:      Number of children: 1   Occupational History     Employer: SELF   Tobacco Use     Smoking status: Former Smoker     Types: Cigarettes     Quit date: 2006     Years since quittin.4     Smokeless tobacco: Never Used   Substance and Sexual Activity     Alcohol use: Yes     Comment: occasionally     Drug use: No     Sexual activity: Yes     Partners: Male     Birth control/protection: Surgical     Comment: vasectomy   Other Topics Concern     Parent/sibling w/ CABG, MI or angioplasty before 65F 55M? No       Family History   Problem Relation Age of Onset     Prostate Cancer Father      Thyroid Disease Mother         Hypothyroid     Hypertension Mother      C.A.D. Maternal Grandfather      Cerebrovascular Disease Maternal Grandmother      Breast Cancer Maternal Aunt      Diabetes Paternal Grandmother         adult onset     Cancer Paternal Grandfather         stomach     Cancer - colorectal No family hx of        ROS: 10 point ROS neg other than the symptoms noted above in the HPI.    Vital Signs: BP (!) 169/118   Pulse 101   SpO2 100%     Physical Examination:  Constitutional:  Alert, well nourished, NAD.  HEENT: Normocephalic, atraumatic.   Pulmonary:  Without shortness of  breath, normal effort.   Cardiovascular:  No pitting edema of BLE.      Neurological:  Awake  Alert  Oriented x 3  Speech clear  Cranial nerves II - XII grossly intact  PERRL  EOMI  Motor exam:  Shoulder Abduction  Right:  5/5   Left:  5/5  Biceps                      Right:  5/5   Left:  4+/5  Triceps                     Right:  5/5   Left:  4+/5  Wrist Extensors        Right:  5/5   Left:  5/5  Wrist Flexors            Right:  5/5   Left:  5/5  Intrinsics                   Right:  5/5   Left:  4/5    Iliopsoas  (hip flexion)               Right: 5/5  Left:  5/5  Quadriceps  (knee extension)       Right:  5/5  Left:  5/5  Hamstrings  (knee flexion)            Right:  5/5  Left:  5/5  Gastroc Soleus  (PF)                          Right:  5/5  Left:  5/5  Tibialis Ant  (DF)                          Right:  5/5  Left:  5/5  EHL                          Right:  5/5  Left:  5/5         Sensation normal to BUE and BLE     Reflexes are 2+ in the patellar and Achilles. There is no clonus.     Reflexes are 2+ in the brachial radialis and triceps. Negative Krystle sign bilaterally.    Musculoskeletal:  Gait: Able to stand from a seated position. Normal non-antalgic, non-myelopathic gait.    Cervical examination reveals pain with range of motion.  No tenderness of the spine or paraspinous muscles.    Imaging:   Cervical spine MRI from 2019 reveals disc osteophyte complex and moderate left foraminal stenosis at C5-6 and C6-7.    Assessment/Plan:   Cervical radiculopathy    52 year old female who presents for evaluation of chronic neck pain radiating to left arm with new weakness and paresthesias in left arm and hand. She has tried various conservative measures over the years. Most recent C7-T1 PAT on 6/3/22 did not provide relief. Cervical spine MRI from 2019 reveals disc osteophyte complex and moderate left foraminal stenosis at C5-6 and C6-7. We discussed options at this time. Given worsening symptoms, will obtain an  updated cervical spine MRI. Patient would also like to try a course of PT again. Will call patient with MRI results and next steps.    Advised patient to call our clinic with any questions or concerns. Discussed red flag symptoms and advised to seek medical attention if these develop. Patient voiced understanding and agreement.      Kymberly Cuevas CNP  [facility] Neurosurgery  [address] [address]  Tel [phone]  Pager [phone]          Again, thank you for allowing me to participate in the care of your patient.        Sincerely,        Kymberly Cuevas, NP

## 2022-07-07 NOTE — PATIENT INSTRUCTIONS
Order for cervical spine MRI. You can call to schedule at [phone]. I will call with the results and next steps.     Referral to physical therapy.     Please call our clinic if symptoms persist, change, or worsen at any time.    [facility] Neurosurgery  [address] [address]  Tel [phone]

## 2022-07-07 NOTE — NURSING NOTE
"Tracee Cooper is a 52 year old female who presents for:  Chief Complaint   Patient presents with     Neurologic Problem     Cervical Radiculopathy        Initial Vitals:  BP (!) 169/118   Pulse 101   SpO2 100%  Estimated body mass index is 29.24 kg/m  as calculated from the following:    Height as of 12/15/20: 5' 4.5\" (1.638 m).    Weight as of 1/11/22: 173 lb (78.5 kg).. There is no height or weight on file to calculate BSA. BP completed using cuff size: regular  Moderate Pain (5)    Nursing Comments: 5/10 pain. Neck to L arm tingling and numbness present.   Jennie to follow up with Primary Care provider regarding elevated blood pressure.    Dayne Diaz    "

## 2022-07-13 ENCOUNTER — VIRTUAL VISIT (OUTPATIENT)
Dept: PSYCHOLOGY | Facility: CLINIC | Age: 52
End: 2022-07-13
Payer: COMMERCIAL

## 2022-07-13 DIAGNOSIS — F41.1 GENERALIZED ANXIETY DISORDER: Primary | ICD-10-CM

## 2022-07-13 DIAGNOSIS — F41.0 PANIC DISORDER WITHOUT AGORAPHOBIA: ICD-10-CM

## 2022-07-13 PROCEDURE — 90837 PSYTX W PT 60 MINUTES: CPT | Mod: 95 | Performed by: COUNSELOR

## 2022-07-13 NOTE — PROGRESS NOTES
"                                           Progress Note    Patient Name: Tracee Cooper  Date: 7/13/22         Service Type: Individual      Session Start Time: 3pm  Session End Time: 3:55     Session Length: 55    Session #: 41    Attendees: Client attended alone     Service Modality:  Phone Visit:     The patient has been notified of the following:      \"We have found that certain health care needs can be provided without the need for a face to face visit.  This service lets us provide the care you need with a phone conversation.       I will have full access to your [place] medical record during this entire phone call.   I will be taking notes for your medical record.      Since this is like an office visit, we will bill your insurance company for this service.       There are potential benefits and risks of telephone visits (e.g. limits to patient confidentiality) that differ from in-person visits.?  Confidentiality still applies for telephone services, and nobody will record the visit.  It is important to be in a quiet, private space that is free of distractions (including cell phone or other devices) during the visit.??      If during the course of the call I believe a telephone visit is not appropriate, you will not be charged for this service\"     Consent has been obtained for this service by care team member: Yes     Treatment Plan Last Reviewed:   3/30/22   PHQ-9 / ORVILLE-7 :         DATA  Interactive Complexity: No  Crisis: No       Progress Since Last Session (Related to Symptoms / Goals / Homework):   Symptoms: Improving        Homework: Achieved / completed to satisfaction          Episode of Care Goals: Satisfactory progress - ACTION (Actively working towards change); Intervened by reinforcing change plan / affirming steps taken     Current / Ongoing Stressors and Concerns:   Ongoing: Anxiety and panic attacks while driving, history of family conflict  Current:  Client reports a couple weeks she had " been experiencing difficulty with driving anxiety. Unclear trigger. Feels she found an effective skill to assist with regulation.  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     Treatment Objective(s) Addressed in This Session:    Patient will use cognitive strategies identified in therapy to identify and challenge negative self-talk / self-judgement.    Patient will identify three distraction and diversion activities and use those activities to decrease level of anxiety.       Intervention:   CBT:    Reflected cognitive distortion and prompted challenge. Used open-ended questions and summary to assist with insight. Explored problem solving skills to assist with anxiety management and decision making. Explored communication skills to assist with more effective collaboration with .      ASSESSMENT: Current Emotional / Mental Status (status of significant symptoms):   Risk status (Self / Other harm or suicidal ideation)   Patient denies current fears or concerns for personal safety.   Patient denies current or recent suicidal ideation or behaviors.   Patientdenies current or recent homicidal ideation or behaviors.   Patient denies current or recent self injurious behavior or ideation.   Patient denies other safety concerns.   Patient reports there has been no change in risk factors since their last session.     Patient reports there has been no change in protective factors since their last session.     Recommended that patient call 911 or go to the local ED should there be a change in any of these risk factors.     Appearance:   Appropriate    Eye Contact:   Good    Psychomotor Behavior: Normal    Attitude:   Cooperative    Orientation:   All   Speech    Rate / Production: Normal/ Responsive    Volume:  Normal    Mood:    Anxious    Affect:    Appropriate    Thought Content:  Clear    Thought Form:  Coherent  Logical      Insight:    Good        Medication Review:   No changes to current psychiatric  "medication(s)     Medication Compliance:   Yes     Changes in Health Issues:   None reported     Chemical Use Review:   Substance Use: Chemical use reviewed, no active concerns identified      Tobacco Use: No current tobacco use.      Diagnosis:  1. Generalized anxiety disorder    2. 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 Schedule \"worry time\".      Eunice Mixon MA, [facility]                                                          ______________________________________________________________________    Treatment Plan    Patient's Name: Tracee Cooper  YOB: 1970    Date: 7/13/22    Diagnoses:  300.02 (F41.1) Generalized Anxiety Disorder   (f41.0) Panic disorder without agoraphobia  Rule out Posttraumatic Stress Disorder  Psychosocial & Contextual Factors: Past trauma, children's mental health concerns, work stressors  WHODAS: Completed    Referral / Collaboration:  Referral to another professional/service is not indicated at this time.    Anticipated number of session or this episode of care: 20      MeasurableTreatment Goal(s) related to diagnosis / functional impairment(s)  Goal 1: Patient will decrease anxiety levels as evidence by ORVILLE-7 scores, and client report.    I will know I've met my goal when I have less panic attacks, when I can drive to a new place without feeling anxious or panicked.      Objective #A (Patient Action)    Patient will identify 3 initial signs or symptoms of anxiety.  Status: Continued - Date(s): 12/16/19, 11/2/20, 2/4/21, 5/17/21, 11/17/21,  3/30/22, 7/13/22    Intervention(s)  Therapist will assign homework    teach emotional recognition/identification.      Objective #B  Patient will use cognitive strategies identified in therapy to challenge anxious thoughts.  Status: Completed - Date: 8/25/21 " and Restarted - Date: , 7/13/22     Intervention(s)  Therapist will assign homework    teach CBT techniques to identify and challenge cognitive distortions.    Objective #C  Patient will identify three distraction and diversion activities and use those activities to decrease level of anxiety  .  Status: Completed - Date: 8/25/21 and Restarted - Date: , 7/13/22     Intervention(s)  Therapist will assign homework    teach DBT techniques including Mindfulness and Distress Tolerance.     Goal 2: Patient will increase sense of confidence and independence per client report.      Objective #A (Patient Action)    Patient will identify 3 ways in which she would like to increase sense of independence.  Status: New: 8/25/21, 11/17/21,  3/30/22, 7/13/22    Intervention(s)  Therapist will assign homework    teach emotional recognition/identification, teach values identification.      Objective #B  Patient will use cognitive strategies identified in therapy to identify and challenge negative self-talk / self-judgement.   Status: New: 8/25/21, 11/17/21,  3/30/22, 7/13/22    Intervention(s)  Therapist will assign homework    teach CBT techniques to identify and challenge cognitive distortions, teach positive affirmations    Objective #C  Patient will engage in daily practice of identifying a positive affirmation or personal success she feels proud of.  Status: New: 8/25/21, 11/17/21,  3/30/22, 7/13/22    Intervention(s)  Therapist will assign homework    teach CBT techniques        Patient has reviewed and agreed to the above plan.      Eunice Mixon MA, [facility]  7/13/22

## 2022-07-15 ENCOUNTER — ANCILLARY PROCEDURE (OUTPATIENT)
Dept: MRI IMAGING | Facility: CLINIC | Age: 52
End: 2022-07-15
Attending: NURSE PRACTITIONER
Payer: COMMERCIAL

## 2022-07-15 DIAGNOSIS — M54.12 CERVICAL RADICULOPATHY: ICD-10-CM

## 2022-07-15 PROCEDURE — 72141 MRI NECK SPINE W/O DYE: CPT | Mod: TC | Performed by: RADIOLOGY

## 2022-07-18 ENCOUNTER — TELEPHONE (OUTPATIENT)
Dept: NEUROSURGERY | Facility: CLINIC | Age: 52
End: 2022-07-18

## 2022-07-18 NOTE — TELEPHONE ENCOUNTER
Called patient to discuss MRI results as stated below. Patient reports continued neck and left arm pain as discussed at office visit. She has tried various conservative measures. She would like to follow-up with Dr. Bazan in clinic to discuss options at this time. Sent message to care team to coordinate follow-up appt. Advised patient to call clinic with any further questions or concerns. She voiced agreement with plan.     MRI OF THE CERVICAL SPINE WITHOUT CONTRAST 7/15/2022 3:10 PM                                                            IMPRESSION:  1. Degenerative change of the cervical spine as detailed above.  2. Worsening left foraminal stenosis at C6-C7; otherwise, no  appreciable change from the comparison study.  3. No significant spinal canal stenosis of the cervical spine.  4. Moderate neural foraminal stenosis on the left at C6-C7. No other  significant neural foraminal narrowing of the cervical spine.

## 2022-07-26 ENCOUNTER — OFFICE VISIT (OUTPATIENT)
Dept: FAMILY MEDICINE | Facility: CLINIC | Age: 52
End: 2022-07-26
Payer: COMMERCIAL

## 2022-07-26 VITALS
HEART RATE: 102 BPM | DIASTOLIC BLOOD PRESSURE: 98 MMHG | OXYGEN SATURATION: 98 % | BODY MASS INDEX: 28.89 KG/M2 | HEIGHT: 64 IN | RESPIRATION RATE: 16 BRPM | WEIGHT: 169.2 LBS | SYSTOLIC BLOOD PRESSURE: 168 MMHG | TEMPERATURE: 98.6 F

## 2022-07-26 DIAGNOSIS — N95.1 VASOMOTOR SYMPTOMS DUE TO MENOPAUSE: ICD-10-CM

## 2022-07-26 DIAGNOSIS — I10 ESSENTIAL HYPERTENSION WITH GOAL BLOOD PRESSURE LESS THAN 140/90: ICD-10-CM

## 2022-07-26 DIAGNOSIS — I10 ESSENTIAL HYPERTENSION WITH GOAL BLOOD PRESSURE LESS THAN 140/90: Primary | ICD-10-CM

## 2022-07-26 PROCEDURE — 99214 OFFICE O/P EST MOD 30 MIN: CPT | Performed by: FAMILY MEDICINE

## 2022-07-26 RX ORDER — GABAPENTIN 300 MG/1
CAPSULE ORAL
Qty: 60 CAPSULE | Refills: 0 | Status: SHIPPED | OUTPATIENT
Start: 2022-07-26 | End: 2022-10-13

## 2022-07-26 RX ORDER — HYDROCHLOROTHIAZIDE 25 MG/1
25 TABLET ORAL DAILY
Qty: 30 TABLET | Refills: 0 | Status: SHIPPED | OUTPATIENT
Start: 2022-07-26 | End: 2022-09-06

## 2022-07-26 RX ORDER — HYDROCHLOROTHIAZIDE 25 MG/1
TABLET ORAL
Qty: 90 TABLET | OUTPATIENT
Start: 2022-07-26

## 2022-07-26 ASSESSMENT — PAIN SCALES - GENERAL: PAINLEVEL: NO PAIN (0)

## 2022-07-26 NOTE — PROGRESS NOTES
A/p:      ICD-10-CM    1. Essential hypertension with goal blood pressure less than 140/90  I10 hydrochlorothiazide (HYDRODIURIL) 25 MG tablet     Basic metabolic panel  (Ca, Cl, CO2, Creat, Gluc, K, Na, BUN)   2. Vasomotor symptoms due to menopause  N95.1 gabapentin (NEURONTIN) 300 MG capsule     HTN:  Not controlled.  Pt stopped amlodipine due to heartburn.  Reviewed past medication list, many tried and failed due to side effects.  hydrochlorothiazide stopped due to hyponatremia but sodium was only 132 at the lowest.  Plan to retry given failure of multiple other meds.  F/u 2 weeks for BP check and labs    Vasomotor symptoms:  Reviewed options of estrogen/progesterone vs gabapentin (already on venlafaxine)  Plan trail of gabapentin.  Reviewed side effect of drowsiness.  F/u 4-6 weeks with update, sooner if needed    Subjective   Jennie is a 52 year old, presenting for the following health issues:  Hypertension      History of Present Illness       Hypertension: She presents for follow up of hypertension.  She does check blood pressure  regularly outside of the clinic. Outside blood pressures have been over 140/90. She does not follow a low salt diet.         Hypertension Follow-up      Do you check your blood pressure regularly outside of the clinic? Yes     Are you following a low salt diet? No    Are your blood pressures ever more than 140 on the top number (systolic) OR more   than 90 on the bottom number (diastolic), for example 140/90? Yes      How many servings of fruits and vegetables do you eat daily?  4 or more    On average, how many sweetened beverages do you drink each day (Examples: soda, juice, sweet tea, etc.  Do NOT count diet or artificially sweetened beverages)?   0    How many days per week do you exercise enough to make your heart beat faster? 3 or less    How many minutes a day do you exercise enough to make your heart beat faster? 9 or less    How many days per week do you miss taking your  "medication? 0    Ended up stopping the amlodipine.  States she was having bad heartburn.  Went out of town and forgot her medication and heartburn resolved.  Would like to try an alternative    *  Has been having a lot of hot flashes and difficulty sleeping.  Worse the last 1.5 months.  This triggers anxiety.  No vaginal bleeding since ablation    Worse at night but can happen during the day as well.  When hot flashes occur her anxiety flares up as well    * never started cholesterol medication and declines conversation about it at this time.  Wants to deal with HTN first.    Knows she needs a mammogram and will schedule    Review of Systems   Constitutional, HEENT, cardiovascular, pulmonary, gi and gu systems are negative, except as otherwise noted.      Objective    BP (!) 168/92 (BP Location: Right arm, Patient Position: Sitting, Cuff Size: Adult Regular)   Pulse 102   Temp 98.6  F (37  C) (Tympanic)   Resp 16   Ht 1.62 m (5' 3.78\")   Wt 76.7 kg (169 lb 3.2 oz)   SpO2 98%   BMI 29.24 kg/m    Body mass index is 29.24 kg/m .  Physical Exam   PE:  VS as above   Gen:  WN/WD/WH female in NAD   Heart:  RRR without murmur, nl S1, S2, no rubs or gallops   Lungs CTA irish without rales/ronchi/wheezes   Ext:  No pedal edema      Epic reviewed                .  ..  "

## 2022-07-26 NOTE — TELEPHONE ENCOUNTER
Refused 90 day supply per Dr Agrawal visit notes.  HTN not managed, follow up BP check in 2 weeks.  Krystal Mcgrath RN

## 2022-07-27 ENCOUNTER — VIRTUAL VISIT (OUTPATIENT)
Dept: PSYCHOLOGY | Facility: CLINIC | Age: 52
End: 2022-07-27
Payer: COMMERCIAL

## 2022-07-27 DIAGNOSIS — F41.1 GENERALIZED ANXIETY DISORDER: Primary | ICD-10-CM

## 2022-07-27 PROCEDURE — 90834 PSYTX W PT 45 MINUTES: CPT | Mod: 95 | Performed by: COUNSELOR

## 2022-07-27 NOTE — PROGRESS NOTES
"                                           Progress Note    Patient Name: Tracee Cooper  Date: 7/27/22         Service Type: Individual      Session Start Time: 10am  Session End Time: 10:45     Session Length: 45    Session #: 42    Attendees: Client attended alone     Service Modality:  Phone Visit:     The patient has been notified of the following:      \"We have found that certain health care needs can be provided without the need for a face to face visit.  This service lets us provide the care you need with a phone conversation.       I will have full access to your [place] medical record during this entire phone call.   I will be taking notes for your medical record.      Since this is like an office visit, we will bill your insurance company for this service.       There are potential benefits and risks of telephone visits (e.g. limits to patient confidentiality) that differ from in-person visits.?  Confidentiality still applies for telephone services, and nobody will record the visit.  It is important to be in a quiet, private space that is free of distractions (including cell phone or other devices) during the visit.??      If during the course of the call I believe a telephone visit is not appropriate, you will not be charged for this service\"     Consent has been obtained for this service by care team member: Yes     Treatment Plan Last Reviewed:   7/13/22  PHQ-9 / ORVILLE-7 :         DATA  Interactive Complexity: No  Crisis: No       Progress Since Last Session (Related to Symptoms / Goals / Homework):   Symptoms: No change        Homework: Achieved / completed to satisfaction          Episode of Care Goals: Satisfactory progress - ACTION (Actively working towards change); Intervened by reinforcing change plan / affirming steps taken     Current / Ongoing Stressors and Concerns:   Ongoing: Anxiety and panic attacks while driving, history of family conflict  Current:  Client continued  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     Treatment Objective(s) Addressed in This Session:    Patient will use cognitive strategies identified in therapy to identify and challenge negative self-talk / self-judgement.    Patient will identify three distraction and diversion activities and use those activities to decrease level of anxiety.        Intervention:   CBT:    Explored problem solving skills to assist with anxiety management and decision making. Coached on One Thing at a Time skill to reduce ineffective rumination. Explored communication skills to assist with more effective collaboration with . Used open-ended questions and summary to assist with insight.       ASSESSMENT: Current Emotional / Mental Status (status of significant symptoms):   Risk status (Self / Other harm or suicidal ideation)   Patient denies current fears or concerns for personal safety.   Patient denies current or recent suicidal ideation or behaviors.   Patientdenies current or recent homicidal ideation or behaviors.   Patient denies current or recent self injurious behavior or ideation.   Patient denies other safety concerns.   Patient reports there has been no change in risk factors since their last session.     Patient reports there has been no change in protective factors since their last session.     Recommended that patient call 911 or go to the local ED should there be a change in any of these risk factors.     Appearance:   Appropriate    Eye Contact:   Good    Psychomotor Behavior: Normal    Attitude:   Cooperative    Orientation:   All   Speech    Rate / Production: Normal/ Responsive    Volume:  Normal    Mood:    Anxious    Affect:    Appropriate    Thought Content:  Clear    Thought Form:  Coherent  Logical      Insight:    Good        Medication Review:   No changes to current psychiatric medication(s)     Medication  "Compliance:   Yes     Changes in Health Issues:   None reported     Chemical Use Review:   Substance Use: Chemical use reviewed, no active concerns identified      Tobacco Use: No current tobacco use.      Diagnosis:  1. 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 Schedule \"worry time\".       Eunice Mixon MA, [facility]                                                          ______________________________________________________________________    Treatment Plan    Patient's Name: Tracee Cooper  YOB: 1970    Date: 7/13/22    Diagnoses:  300.02 (F41.1) Generalized Anxiety Disorder   (f41.0) Panic disorder without agoraphobia  Rule out Posttraumatic Stress Disorder  Psychosocial & Contextual Factors: Past trauma, children's mental health concerns, work stressors  WHODAS: Completed    Referral / Collaboration:  Referral to another professional/service is not indicated at this time.    Anticipated number of session or this episode of care: 20      MeasurableTreatment Goal(s) related to diagnosis / functional impairment(s)  Goal 1: Patient will decrease anxiety levels as evidence by ORVILLE-7 scores, and client report.    I will know I've met my goal when I have less panic attacks, when I can drive to a new place without feeling anxious or panicked.      Objective #A (Patient Action)    Patient will identify 3 initial signs or symptoms of anxiety.  Status: Continued - Date(s): 12/16/19, 11/2/20, 2/4/21, 5/17/21, 11/17/21,  3/30/22, 7/13/22    Intervention(s)  Therapist will assign homework    teach emotional recognition/identification.      Objective #B  Patient will use cognitive strategies identified in therapy to challenge anxious thoughts.  Status: Completed - Date: 8/25/21 and Restarted - Date: , 7/13/22     Intervention(s)  Therapist will " assign homework    teach CBT techniques to identify and challenge cognitive distortions.    Objective #C  Patient will identify three distraction and diversion activities and use those activities to decrease level of anxiety  .  Status: Completed - Date: 8/25/21 and Restarted - Date: , 7/13/22     Intervention(s)  Therapist will assign homework    teach DBT techniques including Mindfulness and Distress Tolerance.     Goal 2: Patient will increase sense of confidence and independence per client report.      Objective #A (Patient Action)    Patient will identify 3 ways in which she would like to increase sense of independence.  Status: New: 8/25/21, 11/17/21,  3/30/22, 7/13/22    Intervention(s)  Therapist will assign homework    teach emotional recognition/identification, teach values identification.      Objective #B  Patient will use cognitive strategies identified in therapy to identify and challenge negative self-talk / self-judgement.   Status: New: 8/25/21, 11/17/21,  3/30/22, 7/13/22    Intervention(s)  Therapist will assign homework    teach CBT techniques to identify and challenge cognitive distortions, teach positive affirmations    Objective #C  Patient will engage in daily practice of identifying a positive affirmation or personal success she feels proud of.  Status: New: 8/25/21, 11/17/21,  3/30/22, 7/13/22    Intervention(s)  Therapist will assign homework    teach CBT techniques        Patient has reviewed and agreed to the above plan.      Eunice Mixon MA, [facility]  7/27/22

## 2022-07-29 ENCOUNTER — MYC MEDICAL ADVICE (OUTPATIENT)
Dept: FAMILY MEDICINE | Facility: CLINIC | Age: 52
End: 2022-07-29

## 2022-08-04 ENCOUNTER — OFFICE VISIT (OUTPATIENT)
Dept: NEUROSURGERY | Facility: CLINIC | Age: 52
End: 2022-08-04
Payer: COMMERCIAL

## 2022-08-04 DIAGNOSIS — M48.02 CERVICAL STENOSIS OF SPINAL CANAL: Primary | ICD-10-CM

## 2022-08-04 PROCEDURE — 99213 OFFICE O/P EST LOW 20 MIN: CPT | Performed by: NEUROLOGICAL SURGERY

## 2022-08-04 NOTE — PROGRESS NOTES
Tracee Cooper is a 52 year old female who presents for evaluation of acute on chronic neck and left arm pain. She was previously seen with our clinic in 2019 for the same symptoms. She has tried 5 injections from  - . Most recent C7-T1 PAT on 6/3/22 did not provide relief. Today, patient reports continued neck pain radiating to left arm with new weakness and paresthesias in left arm and hand that started about 6 months ago. Denies trauma or injuries. Pain is worsened with sitting, bending, twisting, and lifting. Patient has tried PT in the past, but nothing recently. She takes OTC pain medications and uses ice/heat packs for pain control. Denies any dexterity issues, falls, foot drop, saddle anesthesia, or bladder/bowel incontinence.     Returns for follow up.  Continued severe back and left arm pain and weakness.  PAT without improvement.  New MR, personally reviewed, with C5-6 moderate central and left foraminal stenosis, and C6-7 worsening left foraminal stenosis.      Past Medical History:   Diagnosis Date     INFERTILITY 2006 - Clomid trial at 50mg and increase to 100 if not ovulating by predictor.  Discussed clotting, PMS, risks etc.  Referral to fertility specialist in meantime.     Other malignant neoplasm of skin, site unspecified      Premenstrual tension syndromes 3/29/2005       Past Medical History reviewed with patient during visit.    Past Surgical History:   Procedure Laterality Date     C/SECTION, LOW TRANSVERSE      , Low Transverse     D & C  3/8/06    Missed AB at 7 weeks     DILATION AND CURETTAGE, HYSTEROSCOPY, ABLATE ENDOMETRIUM, COMBINED N/A 11/3/2016    Procedure: COMBINED DILATION AND CURETTAGE, HYSTEROSCOPY, ABLATE ENDOMETRIUM;  Surgeon: Jane Marquez MD;  Location: [facility]     HYSTEROSCOPY  2006    F/U US Abnormalit s/p d&c     SURGICAL HISTORY OF -   2002    Malignant melanoma of the right arm.     Past Surgical  History reviewed with patient during visit.    Current Outpatient Medications   Medication     ALPRAZolam (XANAX) 1 MG tablet     gabapentin (NEURONTIN) 300 MG capsule     hydrochlorothiazide (HYDRODIURIL) 25 MG tablet     venlafaxine (EFFEXOR XR) 150 MG 24 hr capsule     No current facility-administered medications for this visit.       No Known Allergies    Social History     Socioeconomic History     Marital status:      Number of children: 1   Occupational History     Employer: SELF   Tobacco Use     Smoking status: Former Smoker     Types: Cigarettes     Quit date: 2006     Years since quittin.4     Smokeless tobacco: Never Used   Substance and Sexual Activity     Alcohol use: Yes     Comment: occasionally     Drug use: No     Sexual activity: Yes     Partners: Male     Birth control/protection: Surgical     Comment: vasectomy   Other Topics Concern     Parent/sibling w/ CABG, MI or angioplasty before 65F 55M? No       Family History   Problem Relation Age of Onset     Thyroid Disease Mother         Hypothyroid     Hypertension Mother      Prostate Cancer Father      Cerebrovascular Disease Maternal Grandmother      C.A.D. Maternal Grandfather      Myocardial Infarction Maternal Grandfather      Diabetes Paternal Grandmother         adult onset     Cancer Paternal Grandfather         stomach     Breast Cancer Maternal Aunt      Cancer - colorectal No family hx of        ROS: 10 point ROS neg other than the symptoms noted above in the HPI.    Vital Signs: There were no vitals taken for this visit.    Physical Examination:  Constitutional:  Alert, well nourished, NAD.  HEENT: Normocephalic, atraumatic.   Pulmonary:  Without shortness of breath, normal effort.   Cardiovascular:  No pitting edema of BLE.      Neurological:  Awake  Alert  Oriented x 3  Speech clear  Cranial nerves II - XII grossly intact  PERRL  EOMI  Motor exam:  Shoulder Abduction  Right:  5/5   Left:  5/5  Biceps                       Right:  5/5   Left:  4+/5  Triceps                     Right:  5/5   Left:  4+/5  Wrist Extensors        Right:  5/5   Left:  5/5  Wrist Flexors            Right:  5/5   Left:  5/5  Intrinsics                   Right:  5/5   Left:  4/5    Iliopsoas  (hip flexion)               Right: 5/5  Left:  5/5  Quadriceps  (knee extension)       Right:  5/5  Left:  5/5  Hamstrings  (knee flexion)            Right:  5/5  Left:  5/5  Gastroc Soleus  (PF)                          Right:  5/5  Left:  5/5  Tibialis Ant  (DF)                          Right:  5/5  Left:  5/5  EHL                          Right:  5/5  Left:  5/5         Sensation normal to BUE and BLE     Reflexes are 2+ in the patellar and Achilles. There is no clonus.     Reflexes are 2+ in the brachial radialis and triceps. Negative Krystle sign bilaterally.    Musculoskeletal:  Gait: Able to stand from a seated position. Normal non-antalgic, non-myelopathic gait.    Cervical examination reveals pain with range of motion.  No tenderness of the spine or paraspinous muscles.      Assessment/Plan:   Cervical radiculopathy    52 year old female who presents for evaluation of chronic neck pain radiating to left arm with new weakness and paresthesias in left arm and hand    Will plan for course of PT  Will order accupuncture  If symptoms persist, may need to consider C5-7 arthroplasty

## 2022-08-04 NOTE — PATIENT INSTRUCTIONS
Order placed for Neck Accupuncture with [facility] They will call you to schedule within a few days.      [facility]  [facility] - [address] [address]  Telephone:  [phone]       Fax:  [phone]

## 2022-08-04 NOTE — NURSING NOTE
"Tracee Cooper is a 52 year old female who presents for:  Chief Complaint   Patient presents with     Consult     Discuss MRI   Cervical radiculopathy        Initial Vitals:  There were no vitals taken for this visit. Estimated body mass index is 29.24 kg/m  as calculated from the following:    Height as of 7/26/22: 5' 3.78\" (1.62 m).    Weight as of 7/26/22: 169 lb 3.2 oz (76.7 kg).. There is no height or weight on file to calculate BSA. BP completed using cuff size: NA (Not Taken)    Nursing Comments: pt refused vitals on this visit.    Dayne Diaz    "

## 2022-08-04 NOTE — LETTER
2022         RE: Tracee Cooper  [address]  [place] [address]        Dear Colleague,    Thank you for referring your patient, Tracee Cooper, to the [facility]. Please see a copy of my visit note below.    Tracee Cooper is a 52 year old female who presents for evaluation of acute on chronic neck and left arm pain. She was previously seen with our clinic in 2019 for the same symptoms. She has tried 5 injections from  - . Most recent C7-T1 PAT on 6/3/22 did not provide relief. Today, patient reports continued neck pain radiating to left arm with new weakness and paresthesias in left arm and hand that started about 6 months ago. Denies trauma or injuries. Pain is worsened with sitting, bending, twisting, and lifting. Patient has tried PT in the past, but nothing recently. She takes OTC pain medications and uses ice/heat packs for pain control. Denies any dexterity issues, falls, foot drop, saddle anesthesia, or bladder/bowel incontinence.     Returns for follow up.  Continued severe back and left arm pain and weakness.  PAT without improvement.  New MR, personally reviewed, with C5-6 moderate central and left foraminal stenosis, and C6-7 worsening left foraminal stenosis.      Past Medical History:   Diagnosis Date     INFERTILITY 2006 - Clomid trial at 50mg and increase to 100 if not ovulating by predictor.  Discussed clotting, PMS, risks etc.  Referral to fertility specialist in meantime.     Other malignant neoplasm of skin, site unspecified      Premenstrual tension syndromes 3/29/2005       Past Medical History reviewed with patient during visit.    Past Surgical History:   Procedure Laterality Date     C/SECTION, LOW TRANSVERSE      , Low Transverse     D & C  3/8/06    Missed AB at 7 weeks     DILATION AND CURETTAGE, HYSTEROSCOPY, ABLATE ENDOMETRIUM, COMBINED N/A 11/3/2016    Procedure: COMBINED  DILATION AND CURETTAGE, HYSTEROSCOPY, ABLATE ENDOMETRIUM;  Surgeon: Jane Marquez MD;  Location: [facility]     HYSTEROSCOPY  2006    F/U US Abnormalit s/p d&c     SURGICAL HISTORY OF -   2002    Malignant melanoma of the right arm.     Past Surgical History reviewed with patient during visit.    Current Outpatient Medications   Medication     ALPRAZolam (XANAX) 1 MG tablet     gabapentin (NEURONTIN) 300 MG capsule     hydrochlorothiazide (HYDRODIURIL) 25 MG tablet     venlafaxine (EFFEXOR XR) 150 MG 24 hr capsule     No current facility-administered medications for this visit.       No Known Allergies    Social History     Socioeconomic History     Marital status:      Number of children: 1   Occupational History     Employer: SELF   Tobacco Use     Smoking status: Former Smoker     Types: Cigarettes     Quit date: 2006     Years since quittin.4     Smokeless tobacco: Never Used   Substance and Sexual Activity     Alcohol use: Yes     Comment: occasionally     Drug use: No     Sexual activity: Yes     Partners: Male     Birth control/protection: Surgical     Comment: vasectomy   Other Topics Concern     Parent/sibling w/ CABG, MI or angioplasty before 65F 55M? No       Family History   Problem Relation Age of Onset     Thyroid Disease Mother         Hypothyroid     Hypertension Mother      Prostate Cancer Father      Cerebrovascular Disease Maternal Grandmother      C.A.D. Maternal Grandfather      Myocardial Infarction Maternal Grandfather      Diabetes Paternal Grandmother         adult onset     Cancer Paternal Grandfather         stomach     Breast Cancer Maternal Aunt      Cancer - colorectal No family hx of        ROS: 10 point ROS neg other than the symptoms noted above in the HPI.    Vital Signs: There were no vitals taken for this visit.    Physical Examination:  Constitutional:  Alert, well nourished, NAD.  HEENT: Normocephalic, atraumatic.   Pulmonary:  Without shortness  of breath, normal effort.   Cardiovascular:  No pitting edema of BLE.      Neurological:  Awake  Alert  Oriented x 3  Speech clear  Cranial nerves II - XII grossly intact  PERRL  EOMI  Motor exam:  Shoulder Abduction  Right:  5/5   Left:  5/5  Biceps                      Right:  5/5   Left:  4+/5  Triceps                     Right:  5/5   Left:  4+/5  Wrist Extensors        Right:  5/5   Left:  5/5  Wrist Flexors            Right:  5/5   Left:  5/5  Intrinsics                   Right:  5/5   Left:  4/5    Iliopsoas  (hip flexion)               Right: 5/5  Left:  5/5  Quadriceps  (knee extension)       Right:  5/5  Left:  5/5  Hamstrings  (knee flexion)            Right:  5/5  Left:  5/5  Gastroc Soleus  (PF)                          Right:  5/5  Left:  5/5  Tibialis Ant  (DF)                          Right:  5/5  Left:  5/5  EHL                          Right:  5/5  Left:  5/5         Sensation normal to BUE and BLE     Reflexes are 2+ in the patellar and Achilles. There is no clonus.     Reflexes are 2+ in the brachial radialis and triceps. Negative Krystle sign bilaterally.    Musculoskeletal:  Gait: Able to stand from a seated position. Normal non-antalgic, non-myelopathic gait.    Cervical examination reveals pain with range of motion.  No tenderness of the spine or paraspinous muscles.      Assessment/Plan:   Cervical radiculopathy    52 year old female who presents for evaluation of chronic neck pain radiating to left arm with new weakness and paresthesias in left arm and hand    Will plan for course of PT  Will order accupuncture  If symptoms persist, may need to consider C5-7 arthroplasty        Again, thank you for allowing me to participate in the care of your patient.        Sincerely,        Fred Bazan MD

## 2022-08-09 ENCOUNTER — MYC REFILL (OUTPATIENT)
Dept: FAMILY MEDICINE | Facility: CLINIC | Age: 52
End: 2022-08-09

## 2022-08-09 DIAGNOSIS — F41.1 GENERALIZED ANXIETY DISORDER: ICD-10-CM

## 2022-08-10 RX ORDER — ALPRAZOLAM 1 MG
TABLET ORAL
Qty: 20 TABLET | Refills: 0 | Status: SHIPPED | OUTPATIENT
Start: 2022-08-10 | End: 2022-10-19

## 2022-08-24 ENCOUNTER — VIRTUAL VISIT (OUTPATIENT)
Dept: PSYCHOLOGY | Facility: CLINIC | Age: 52
End: 2022-08-24
Payer: COMMERCIAL

## 2022-08-24 DIAGNOSIS — F41.1 GENERALIZED ANXIETY DISORDER: Primary | ICD-10-CM

## 2022-08-24 PROCEDURE — 90834 PSYTX W PT 45 MINUTES: CPT | Mod: 95 | Performed by: COUNSELOR

## 2022-08-24 NOTE — PROGRESS NOTES
"                                           Progress Note    Patient Name: Tracee Cooper  Date: 8/24/22         Service Type: Individual      Session Start Time: 12:05pm  Session End Time: 12:45     Session Length: 40    Session #: 43    Attendees: Client attended alone     Service Modality:  Phone Visit:     The patient has been notified of the following:      \"We have found that certain health care needs can be provided without the need for a face to face visit.  This service lets us provide the care you need with a phone conversation.       I will have full access to your [facility] medical record during this entire phone call.   I will be taking notes for your medical record.      Since this is like an office visit, we will bill your insurance company for this service.       There are potential benefits and risks of telephone visits (e.g. limits to patient confidentiality) that differ from in-person visits.?  Confidentiality still applies for telephone services, and nobody will record the visit.  It is important to be in a quiet, private space that is free of distractions (including cell phone or other devices) during the visit.??      If during the course of the call I believe a telephone visit is not appropriate, you will not be charged for this service\"     Consent has been obtained for this service by care team member: Yes     Treatment Plan Last Reviewed:   7/13/22  PHQ-9 / ORVILLE-7 :         DATA  Interactive Complexity: No  Crisis: No       Progress Since Last Session (Related to Symptoms / Goals / Homework):   Symptoms: No change        Homework: Achieved / completed to satisfaction          Episode of Care Goals: Satisfactory progress - ACTION (Actively working towards change); Intervened by reinforcing change plan / affirming steps taken     Current / Ongoing Stressors and Concerns:   Ongoing: Anxiety and panic attacks while driving, history of family conflict  Current:  Client utilized session to " discuss upsetting interactions with sister-in-laws, and ways in which these interactions impact self-esteem and relationship with .     Treatment Objective(s) Addressed in This Session:    Patient will use cognitive strategies identified in therapy to identify and challenge negative self-talk / self-judgement.    Patient will identify three distraction and diversion activities and use those activities to decrease level of anxiety.        Intervention:   CBT:    Explored problem solving skills to assist with anxiety management and assertiveness. Explored communication skills to assist with more effective collaboration with . Used open-ended questions and summary to assist with insight.       ASSESSMENT: Current Emotional / Mental Status (status of significant symptoms):   Risk status (Self / Other harm or suicidal ideation)   Patient denies current fears or concerns for personal safety.   Patient denies current or recent suicidal ideation or behaviors.   Patientdenies current or recent homicidal ideation or behaviors.   Patient denies current or recent self injurious behavior or ideation.   Patient denies other safety concerns.   Patient reports there has been no change in risk factors since their last session.     Patient reports there has been no change in protective factors since their last session.     Recommended that patient call 911 or go to the local ED should there be a change in any of these risk factors.     Appearance:   Appropriate    Eye Contact:   Good    Psychomotor Behavior: Normal    Attitude:   Cooperative    Orientation:   All   Speech    Rate / Production: Normal/ Responsive    Volume:  Normal    Mood:    Anxious    Affect:    Appropriate    Thought Content:  Clear    Thought Form:  Coherent  Logical      Insight:    Good        Medication Review:   No changes to current psychiatric medication(s)     Medication Compliance:   Yes     Changes in Health Issues:   None  "reported     Chemical Use Review:   Substance Use: Chemical use reviewed, no active concerns identified      Tobacco Use: No current tobacco use.      Diagnosis:  1. 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 Schedule \"worry time\".   [place] identification, communication with .      Eunice Mixon MA, [facility]                                                          ______________________________________________________________________    Treatment Plan    Patient's Name: Tracee Cooper  YOB: 1970    Date: 7/13/22    Diagnoses:  300.02 (F41.1) Generalized Anxiety Disorder   (f41.0) Panic disorder without agoraphobia  Rule out Posttraumatic Stress Disorder  Psychosocial & Contextual Factors: Past trauma, children's mental health concerns, work stressors  WHODAS: Completed    Referral / Collaboration:  Referral to another professional/service is not indicated at this time.    Anticipated number of session or this episode of care: 20      MeasurableTreatment Goal(s) related to diagnosis / functional impairment(s)  Goal 1: Patient will decrease anxiety levels as evidence by ORVILLE-7 scores, and client report.    I will know I've met my goal when I have less panic attacks, when I can drive to a new place without feeling anxious or panicked.      Objective #A (Patient Action)    Patient will identify 3 initial signs or symptoms of anxiety.  Status: Continued - Date(s): 12/16/19, 11/2/20, 2/4/21, 5/17/21, 11/17/21,  3/30/22, 7/13/22    Intervention(s)  Therapist will assign homework    teach emotional recognition/identification.      Objective #B  Patient will use cognitive strategies identified in therapy to challenge anxious thoughts.  Status: Completed - Date: 8/25/21 and Restarted - Date: , 7/13/22     Intervention(s)  Therapist will " assign homework    teach CBT techniques to identify and challenge cognitive distortions.    Objective #C  Patient will identify three distraction and diversion activities and use those activities to decrease level of anxiety  .  Status: Completed - Date: 8/25/21 and Restarted - Date: , 7/13/22     Intervention(s)  Therapist will assign homework    teach DBT techniques including Mindfulness and Distress Tolerance.     Goal 2: Patient will increase sense of confidence and independence per client report.      Objective #A (Patient Action)    Patient will identify 3 ways in which she would like to increase sense of independence.  Status: New: 8/25/21, 11/17/21,  3/30/22, 7/13/22    Intervention(s)  Therapist will assign homework    teach emotional recognition/identification, teach values identification.      Objective #B  Patient will use cognitive strategies identified in therapy to identify and challenge negative self-talk / self-judgement.   Status: New: 8/25/21, 11/17/21,  3/30/22, 7/13/22    Intervention(s)  Therapist will assign homework    teach CBT techniques to identify and challenge cognitive distortions, teach positive affirmations    Objective #C  Patient will engage in daily practice of identifying a positive affirmation or personal success she feels proud of.  Status: New: 8/25/21, 11/17/21,  3/30/22, 7/13/22    Intervention(s)  Therapist will assign homework    teach CBT techniques        Patient has reviewed and agreed to the above plan.      Eunice Mixon MA, [facility]  8/24/22

## 2022-09-06 ENCOUNTER — MYC REFILL (OUTPATIENT)
Dept: FAMILY MEDICINE | Facility: CLINIC | Age: 52
End: 2022-09-06

## 2022-09-06 DIAGNOSIS — I10 ESSENTIAL HYPERTENSION WITH GOAL BLOOD PRESSURE LESS THAN 140/90: ICD-10-CM

## 2022-09-07 DIAGNOSIS — I10 ESSENTIAL HYPERTENSION WITH GOAL BLOOD PRESSURE LESS THAN 140/90: ICD-10-CM

## 2022-09-07 RX ORDER — HYDROCHLOROTHIAZIDE 25 MG/1
TABLET ORAL
Qty: 90 TABLET | OUTPATIENT
Start: 2022-09-07

## 2022-09-07 RX ORDER — HYDROCHLOROTHIAZIDE 25 MG/1
25 TABLET ORAL DAILY
Qty: 30 TABLET | Refills: 0 | Status: SHIPPED | OUTPATIENT
Start: 2022-09-07 | End: 2022-10-07

## 2022-09-07 NOTE — TELEPHONE ENCOUNTER
Routing refill request to provider for review/approval because:  Last recorded blood pressure does not meet RN protocol parameters   See patient's message in refill     Marcio Rodrigues RN

## 2022-09-27 ENCOUNTER — VIRTUAL VISIT (OUTPATIENT)
Dept: PSYCHOLOGY | Facility: CLINIC | Age: 52
End: 2022-09-27
Payer: COMMERCIAL

## 2022-09-27 DIAGNOSIS — F41.0 PANIC DISORDER WITHOUT AGORAPHOBIA: ICD-10-CM

## 2022-09-27 DIAGNOSIS — F41.1 GENERALIZED ANXIETY DISORDER: Primary | ICD-10-CM

## 2022-09-27 PROCEDURE — 90834 PSYTX W PT 45 MINUTES: CPT | Mod: 95 | Performed by: COUNSELOR

## 2022-09-27 NOTE — PROGRESS NOTES
"                                           Progress Note    Patient Name: Tracee Cooper  Date: 9/27/22         Service Type: Individual      Session Start Time: 1pm  Session End Time: 1:45     Session Length: 40    Session #: 4    Attendees: Client attended alone     Service Modality:  Phone Visit:     The patient has been notified of the following:      \"We have found that certain health care needs can be provided without the need for a face to face visit.  This service lets us provide the care you need with a phone conversation.       I will have full access to your [facility] medical record during this entire phone call.   I will be taking notes for your medical record.      Since this is like an office visit, we will bill your insurance company for this service.       There are potential benefits and risks of telephone visits (e.g. limits to patient confidentiality) that differ from in-person visits.?  Confidentiality still applies for telephone services, and nobody will record the visit.  It is important to be in a quiet, private space that is free of distractions (including cell phone or other devices) during the visit.??      If during the course of the call I believe a telephone visit is not appropriate, you will not be charged for this service\"     Consent has been obtained for this service by care team member: Yes     Treatment Plan Last Reviewed:   7/13/22  PHQ-9 / ORVILLE-7 :         DATA  Interactive Complexity: No  Crisis: No       Progress Since Last Session (Related to Symptoms / Goals / Homework):   Symptoms: No change        Homework: Achieved / completed to satisfaction          Episode of Care Goals: Satisfactory progress - ACTION (Actively working towards change); Intervened by reinforcing change plan / affirming steps taken     Current / Ongoing Stressors and Concerns:   Ongoing: Anxiety and panic attacks while driving, history of family conflict  Current:  Client discussed ongoing concerns " around her daughter's weight and emotions surrounding this.      Treatment Objective(s) Addressed in This Session:    Patient will use cognitive strategies identified in therapy to identify and challenge negative self-talk / self-judgement.    Patient will identify three distraction and diversion activities and use those activities to decrease level of anxiety.         Intervention:   CBT:    Reflected and offered psychoeducation around cognitive distortions. Modeled challenge. Explored communication skills to assist with more effective collaboration with . Used open-ended questions and summary to assist with insight. Discussed treatment options to better support eating disorder recovery and how it impacts relationships with daughter.      ASSESSMENT: Current Emotional / Mental Status (status of significant symptoms):   Risk status (Self / Other harm or suicidal ideation)   Patient denies current fears or concerns for personal safety.   Patient denies current or recent suicidal ideation or behaviors.   Patientdenies current or recent homicidal ideation or behaviors.   Patient denies current or recent self injurious behavior or ideation.   Patient denies other safety concerns.   Patient reports there has been no change in risk factors since their last session.     Patient reports there has been no change in protective factors since their last session.     Recommended that patient call 911 or go to the local ED should there be a change in any of these risk factors.     Appearance:   Not Assessed    Eye Contact:   Not Assessed    Psychomotor Behavior: Not Assessed    Attitude:   Cooperative    Orientation:   All   Speech    Rate / Production: Normal/ Responsive    Volume:  Normal    Mood:    Anxious    Affect:    Appropriate    Thought Content:  Clear    Thought Form:  Coherent  Logical      Insight:    Good        Medication Review:   No changes to current psychiatric medication(s)     Medication  Compliance:   Yes     Changes in Health Issues:   None reported     Chemical Use Review:   Substance Use: Chemical use reviewed, no active concerns identified      Tobacco Use: No current tobacco use.      Diagnosis:  1. Generalized anxiety disorder    2. Panic disorder without agoraphobia         Collateral Reports Completed:   Not Applicable      PLAN: (Patient Tasks / Therapist Tasks / Other)  HOMEWORK:  Mindfulness around judgments regarding weight/body image/self-worth      Eunice Mixon MA, [facility]                                                          ______________________________________________________________________    Treatment Plan    Patient's Name: Tracee Cooper  YOB: 1970    Date: 7/13/22    Diagnoses:  300.02 (F41.1) Generalized Anxiety Disorder   (f41.0) Panic disorder without agoraphobia  Rule out Posttraumatic Stress Disorder  Psychosocial & Contextual Factors: Past trauma, children's mental health concerns, work stressors  WHODAS: Completed    Referral / Collaboration:  Referral to another professional/service is not indicated at this time.    Anticipated number of session or this episode of care: 20      MeasurableTreatment Goal(s) related to diagnosis / functional impairment(s)  Goal 1: Patient will decrease anxiety levels as evidence by ORVILLE-7 scores, and client report.    I will know I've met my goal when I have less panic attacks, when I can drive to a new place without feeling anxious or panicked.      Objective #A (Patient Action)    Patient will identify 3 initial signs or symptoms of anxiety.  Status: Continued - Date(s): 12/16/19, 11/2/20, 2/4/21, 5/17/21, 11/17/21,  3/30/22, 7/13/22    Intervention(s)  Therapist will assign homework    teach emotional recognition/identification.      Objective #B  Patient will use cognitive strategies identified in therapy to challenge anxious thoughts.  Status: Completed - Date: 8/25/21 and Restarted - Date: , 7/13/22      Intervention(s)  Therapist will assign homework    teach CBT techniques to identify and challenge cognitive distortions.    Objective #C  Patient will identify three distraction and diversion activities and use those activities to decrease level of anxiety  .  Status: Completed - Date: 8/25/21 and Restarted - Date: , 7/13/22     Intervention(s)  Therapist will assign homework    teach DBT techniques including Mindfulness and Distress Tolerance.     Goal 2: Patient will increase sense of confidence and independence per client report.      Objective #A (Patient Action)    Patient will identify 3 ways in which she would like to increase sense of independence.  Status: New: 8/25/21, 11/17/21,  3/30/22, 7/13/22    Intervention(s)  Therapist will assign homework    teach emotional recognition/identification, teach values identification.      Objective #B  Patient will use cognitive strategies identified in therapy to identify and challenge negative self-talk / self-judgement.   Status: New: 8/25/21, 11/17/21,  3/30/22, 7/13/22    Intervention(s)  Therapist will assign homework    teach CBT techniques to identify and challenge cognitive distortions, teach positive affirmations    Objective #C  Patient will engage in daily practice of identifying a positive affirmation or personal success she feels proud of.  Status: New: 8/25/21, 11/17/21,  3/30/22, 7/13/22    Intervention(s)  Therapist will assign homework    teach CBT techniques        Patient has reviewed and agreed to the above plan.      Eunice Mixon MA, [facility]  9/27/22

## 2022-10-06 ENCOUNTER — LAB (OUTPATIENT)
Dept: LAB | Facility: CLINIC | Age: 52
End: 2022-10-06
Payer: COMMERCIAL

## 2022-10-06 DIAGNOSIS — I10 ESSENTIAL HYPERTENSION WITH GOAL BLOOD PRESSURE LESS THAN 140/90: ICD-10-CM

## 2022-10-06 LAB
ANION GAP SERPL CALCULATED.3IONS-SCNC: 12 MMOL/L (ref 7–15)
BUN SERPL-MCNC: 13.9 MG/DL (ref 6–20)
CALCIUM SERPL-MCNC: 10.8 MG/DL (ref 8.6–10)
CHLORIDE SERPL-SCNC: 97 MMOL/L (ref 98–107)
CREAT SERPL-MCNC: 0.74 MG/DL (ref 0.51–0.95)
DEPRECATED HCO3 PLAS-SCNC: 29 MMOL/L (ref 22–29)
GFR SERPL CREATININE-BSD FRML MDRD: >90 ML/MIN/1.73M2
GLUCOSE SERPL-MCNC: 174 MG/DL (ref 70–99)
POTASSIUM SERPL-SCNC: 3.8 MMOL/L (ref 3.4–5.3)
SODIUM SERPL-SCNC: 138 MMOL/L (ref 136–145)

## 2022-10-06 PROCEDURE — 36415 COLL VENOUS BLD VENIPUNCTURE: CPT

## 2022-10-06 PROCEDURE — 80048 BASIC METABOLIC PNL TOTAL CA: CPT

## 2022-10-07 ENCOUNTER — MYC REFILL (OUTPATIENT)
Dept: FAMILY MEDICINE | Facility: CLINIC | Age: 52
End: 2022-10-07

## 2022-10-07 ENCOUNTER — E-VISIT (OUTPATIENT)
Dept: FAMILY MEDICINE | Facility: CLINIC | Age: 52
End: 2022-10-07
Payer: COMMERCIAL

## 2022-10-07 ENCOUNTER — MYC MEDICAL ADVICE (OUTPATIENT)
Dept: FAMILY MEDICINE | Facility: CLINIC | Age: 52
End: 2022-10-07

## 2022-10-07 DIAGNOSIS — I10 ESSENTIAL HYPERTENSION WITH GOAL BLOOD PRESSURE LESS THAN 140/90: Primary | ICD-10-CM

## 2022-10-07 DIAGNOSIS — I10 ESSENTIAL HYPERTENSION WITH GOAL BLOOD PRESSURE LESS THAN 140/90: ICD-10-CM

## 2022-10-07 PROCEDURE — 99207 PR NON-BILLABLE SERV PER CHARTING: CPT | Performed by: FAMILY MEDICINE

## 2022-10-07 RX ORDER — HYDROCHLOROTHIAZIDE 25 MG/1
25 TABLET ORAL DAILY
Qty: 30 TABLET | Refills: 0 | Status: SHIPPED | OUTPATIENT
Start: 2022-10-07 | End: 2022-10-13

## 2022-10-07 RX ORDER — HYDROCHLOROTHIAZIDE 25 MG/1
TABLET ORAL
Qty: 90 TABLET | OUTPATIENT
Start: 2022-10-07

## 2022-10-07 NOTE — TELEPHONE ENCOUNTER
Routing refill request to provider for review/approval because:  Janell given x1 and patient did not follow up, please advise  Patient has not scheduled visit.  Krystal Mcgrath RN

## 2022-10-12 ENCOUNTER — VIRTUAL VISIT (OUTPATIENT)
Dept: PSYCHOLOGY | Facility: CLINIC | Age: 52
End: 2022-10-12
Payer: COMMERCIAL

## 2022-10-12 DIAGNOSIS — F41.1 GENERALIZED ANXIETY DISORDER: Primary | ICD-10-CM

## 2022-10-12 DIAGNOSIS — F41.0 PANIC DISORDER WITHOUT AGORAPHOBIA: ICD-10-CM

## 2022-10-12 PROCEDURE — 90834 PSYTX W PT 45 MINUTES: CPT | Mod: 95 | Performed by: COUNSELOR

## 2022-10-12 NOTE — PROGRESS NOTES
"                                           Progress Note    Patient Name: Tracee Cooper  Date: 10/12/22         Service Type: Individual      Session Start Time: 2pm  Session End Time: 2:45     Session Length: 45    Session #: 5    Attendees: Client attended alone     Service Modality:  Phone Visit:     The patient has been notified of the following:      \"We have found that certain health care needs can be provided without the need for a face to face visit.  This service lets us provide the care you need with a phone conversation.       I will have full access to your [facility] medical record during this entire phone call.   I will be taking notes for your medical record.      Since this is like an office visit, we will bill your insurance company for this service.       There are potential benefits and risks of telephone visits (e.g. limits to patient confidentiality) that differ from in-person visits.?  Confidentiality still applies for telephone services, and nobody will record the visit.  It is important to be in a quiet, private space that is free of distractions (including cell phone or other devices) during the visit.??      If during the course of the call I believe a telephone visit is not appropriate, you will not be charged for this service\"     Consent has been obtained for this service by care team member: Yes     Treatment Plan Last Reviewed:   7/13/22  PHQ-9 / ORVILLE-7 :         DATA  Interactive Complexity: No  Crisis: No       Progress Since Last Session (Related to Symptoms / Goals / Homework):   Symptoms: No change        Homework: Achieved / completed to satisfaction          Episode of Care Goals: Satisfactory progress - ACTION (Actively working towards change); Intervened by reinforcing change plan / affirming steps taken     Current / Ongoing Stressors and Concerns:   Ongoing: Anxiety and panic attacks while driving, history of family conflict  Current:  Client discussed ongoing anxiety " triggers and interpersonal triggers.     Treatment Objective(s) Addressed in This Session:    Patient will use cognitive strategies identified in therapy to identify and challenge negative self-talk / self-judgement.    Patient will identify three distraction and diversion activities and use those activities to decrease level of anxiety.         Intervention:   CBT:    Reflected and offered psychoeducation around cognitive distortions. Modeled challenge. Explored communication skills to assist with more effective collaboration with . Used open-ended questions and summary to assist with insight.       ASSESSMENT: Current Emotional / Mental Status (status of significant symptoms):   Risk status (Self / Other harm or suicidal ideation)   Patient denies current fears or concerns for personal safety.   Patient denies current or recent suicidal ideation or behaviors.   Patientdenies current or recent homicidal ideation or behaviors.   Patient denies current or recent self injurious behavior or ideation.   Patient denies other safety concerns.   Patient reports there has been no change in risk factors since their last session.     Patient reports there has been no change in protective factors since their last session.     Recommended that patient call 911 or go to the local ED should there be a change in any of these risk factors.     Appearance:   Not Assessed    Eye Contact:   Not Assessed    Psychomotor Behavior: Not Assessed    Attitude:   Cooperative    Orientation:   All   Speech    Rate / Production: Normal/ Responsive    Volume:  Normal    Mood:    Anxious    Affect:    Appropriate    Thought Content:  Clear    Thought Form:  Coherent  Logical      Insight:    Good        Medication Review:   No changes to current psychiatric medication(s)     Medication Compliance:   Yes     Changes in Health Issues:   None reported     Chemical Use Review:   Substance Use: Chemical use reviewed, no active concerns  identified      Tobacco Use: No current tobacco use.      Diagnosis:  1. Generalized anxiety disorder    2. Panic disorder without agoraphobia         Collateral Reports Completed:   Not Applicable      PLAN: (Patient Tasks / Therapist Tasks / Other)  HOMEWORK:  Mindfulness around judgments regarding weight/body image/self-worth       Eunice Mixon MA, [facility]                                                          ______________________________________________________________________    Treatment Plan    Patient's Name: Tracee Cooper  YOB: 1970    Date: 7/13/22    Diagnoses:  300.02 (F41.1) Generalized Anxiety Disorder   (f41.0) Panic disorder without agoraphobia  Rule out Posttraumatic Stress Disorder  Psychosocial & Contextual Factors: Past trauma, children's mental health concerns, work stressors  WHODAS: Completed    Referral / Collaboration:  Referral to another professional/service is not indicated at this time.    Anticipated number of session or this episode of care: 20      MeasurableTreatment Goal(s) related to diagnosis / functional impairment(s)  Goal 1: Patient will decrease anxiety levels as evidence by ORVILLE-7 scores, and client report.    I will know I've met my goal when I have less panic attacks, when I can drive to a new place without feeling anxious or panicked.      Objective #A (Patient Action)    Patient will identify 3 initial signs or symptoms of anxiety.  Status: Continued - Date(s): 12/16/19, 11/2/20, 2/4/21, 5/17/21, 11/17/21,  3/30/22, 7/13/22    Intervention(s)  Therapist will assign homework    teach emotional recognition/identification.      Objective #B  Patient will use cognitive strategies identified in therapy to challenge anxious thoughts.  Status: Completed - Date: 8/25/21 and Restarted - Date: , 7/13/22     Intervention(s)  Therapist will assign homework    teach CBT techniques to identify and challenge cognitive distortions.    Objective #C  Patient will  identify three distraction and diversion activities and use those activities to decrease level of anxiety  .  Status: Completed - Date: 8/25/21 and Restarted - Date: , 7/13/22     Intervention(s)  Therapist will assign homework    teach DBT techniques including Mindfulness and Distress Tolerance.     Goal 2: Patient will increase sense of confidence and independence per client report.      Objective #A (Patient Action)    Patient will identify 3 ways in which she would like to increase sense of independence.  Status: New: 8/25/21, 11/17/21,  3/30/22, 7/13/22    Intervention(s)  Therapist will assign homework    teach emotional recognition/identification, teach values identification.      Objective #B  Patient will use cognitive strategies identified in therapy to identify and challenge negative self-talk / self-judgement.   Status: New: 8/25/21, 11/17/21,  3/30/22, 7/13/22    Intervention(s)  Therapist will assign homework    teach CBT techniques to identify and challenge cognitive distortions, teach positive affirmations    Objective #C  Patient will engage in daily practice of identifying a positive affirmation or personal success she feels proud of.  Status: New: 8/25/21, 11/17/21,  3/30/22, 7/13/22    Intervention(s)  Therapist will assign homework    teach CBT techniques        Patient has reviewed and agreed to the above plan.      Eunice Mixon MA, [facility]  10/12/22

## 2022-10-13 ENCOUNTER — VIRTUAL VISIT (OUTPATIENT)
Dept: FAMILY MEDICINE | Facility: CLINIC | Age: 52
End: 2022-10-13
Payer: COMMERCIAL

## 2022-10-13 DIAGNOSIS — E78.5 HYPERLIPIDEMIA LDL GOAL <130: ICD-10-CM

## 2022-10-13 DIAGNOSIS — I10 ESSENTIAL HYPERTENSION WITH GOAL BLOOD PRESSURE LESS THAN 140/90: ICD-10-CM

## 2022-10-13 DIAGNOSIS — F41.1 GENERALIZED ANXIETY DISORDER: ICD-10-CM

## 2022-10-13 PROCEDURE — 99214 OFFICE O/P EST MOD 30 MIN: CPT | Mod: 95 | Performed by: FAMILY MEDICINE

## 2022-10-13 RX ORDER — HYDROCHLOROTHIAZIDE 25 MG/1
25 TABLET ORAL DAILY
Qty: 90 TABLET | Refills: 3 | Status: SHIPPED | OUTPATIENT
Start: 2022-10-13 | End: 2023-08-22

## 2022-10-13 RX ORDER — VENLAFAXINE HYDROCHLORIDE 150 MG/1
CAPSULE, EXTENDED RELEASE ORAL
Qty: 90 CAPSULE | Refills: 1 | Status: SHIPPED | OUTPATIENT
Start: 2022-10-13 | End: 2023-06-29

## 2022-10-13 RX ORDER — VALSARTAN 80 MG/1
80 TABLET ORAL DAILY
Qty: 30 TABLET | Refills: 0 | Status: SHIPPED | OUTPATIENT
Start: 2022-10-13 | End: 2022-11-18

## 2022-10-13 NOTE — PROGRESS NOTES
"Jennie is a 52 year old who is being evaluated via a billable telephone visit.      What phone number would you like to be contacted at? [phone]  How would you like to obtain your AVS? MyChart    A/P:      ICD-10-CM    1. Essential hypertension with goal blood pressure less than 140/90  I10 valsartan (DIOVAN) 80 MG tablet     Basic metabolic panel  (Ca, Cl, CO2, Creat, Gluc, K, Na, BUN)     hydrochlorothiazide (HYDRODIURIL) 25 MG tablet      2. Hyperlipidemia LDL goal <130  E78.5       3. Generalized anxiety disorder  F41.1 venlafaxine (EFFEXOR XR) 150 MG 24 hr capsule        HTN:  Has tried and failed many antihypertensives due to intolerance.  Stopped losartan due to muscle aches.  Plan trial of valsartan in addition to HTCZ.  F/u BP check and labs in 2 weeks.    ORVILLE:  Symptoms well managed. Continue venlafaxine      Subjective   Jennie is a 52 year old, presenting for the following health issues:  Hypertension      HPI     Hypertension Follow-up      Do you check your blood pressure regularly outside of the clinic? Yes     Are you following a low salt diet? No    Are your blood pressures ever more than 140 on the top number (systolic) OR more   than 90 on the bottom number (diastolic), for example 140/90? Yes        Home BP's 150's/100's  Feels hydrochlorothiazide has helped but still high.  Checking once weekly.  Willing to consider adding another medicine.    Stopped amlodipine due to GERD.  Stopped lisinopril due to \"feeling ill\".  Stopped losartan due to muscle aches.    Review of Systems   Constitutional, HEENT, cardiovascular, pulmonary, gi and gu systems are negative, except as otherwise noted.      Objective           Vitals:  No vitals were obtained today due to virtual visit.    Physical Exam   healthy, alert and no distress  PSYCH: Alert and oriented times 3; coherent speech, normal   rate and volume, able to articulate logical thoughts, able   to abstract reason, no tangential thoughts, no " hallucinations   or delusions  Her affect is normal  RESP: No cough, no audible wheezing, able to talk in full sentences  Remainder of exam unable to be completed due to telephone visits    Epic reviewed            Phone call duration: 12 minutes

## 2022-10-14 RX ORDER — VALSARTAN 80 MG/1
TABLET ORAL
Qty: 90 TABLET | OUTPATIENT
Start: 2022-10-14

## 2022-10-19 ENCOUNTER — MYC REFILL (OUTPATIENT)
Dept: FAMILY MEDICINE | Facility: CLINIC | Age: 52
End: 2022-10-19

## 2022-10-19 DIAGNOSIS — F41.1 GENERALIZED ANXIETY DISORDER: ICD-10-CM

## 2022-10-20 RX ORDER — ALPRAZOLAM 1 MG
TABLET ORAL
Qty: 20 TABLET | Refills: 0 | Status: SHIPPED | OUTPATIENT
Start: 2022-10-20 | End: 2023-01-06

## 2022-10-30 ENCOUNTER — HEALTH MAINTENANCE LETTER (OUTPATIENT)
Age: 52
End: 2022-10-30

## 2022-11-09 ENCOUNTER — VIRTUAL VISIT (OUTPATIENT)
Dept: PSYCHOLOGY | Facility: CLINIC | Age: 52
End: 2022-11-09
Payer: COMMERCIAL

## 2022-11-09 DIAGNOSIS — F41.1 GENERALIZED ANXIETY DISORDER: Primary | ICD-10-CM

## 2022-11-09 PROCEDURE — 90834 PSYTX W PT 45 MINUTES: CPT | Mod: 95 | Performed by: COUNSELOR

## 2022-11-09 NOTE — PROGRESS NOTES
"                                           Progress Note    Patient Name: Tracee Cooper  Date: 11/9/22         Service Type: Individual      Session Start Time: 2:10pm  Session End Time: 2:48     Session Length: 38    Session #: 46    Attendees: Client attended alone     Service Modality:  Phone Visit:     The patient has been notified of the following:      \"We have found that certain health care needs can be provided without the need for a face to face visit.  This service lets us provide the care you need with a phone conversation.       I will have full access to your [facility] medical record during this entire phone call.   I will be taking notes for your medical record.      Since this is like an office visit, we will bill your insurance company for this service.       There are potential benefits and risks of telephone visits (e.g. limits to patient confidentiality) that differ from in-person visits.?  Confidentiality still applies for telephone services, and nobody will record the visit.  It is important to be in a quiet, private space that is free of distractions (including cell phone or other devices) during the visit.??      If during the course of the call I believe a telephone visit is not appropriate, you will not be charged for this service\"     Consent has been obtained for this service by care team member: Yes     Treatment Plan Last Reviewed:   7/13/22  PHQ-9 / ORVILLE-7 :         DATA  Interactive Complexity: No  Crisis: No       Progress Since Last Session (Related to Symptoms / Goals / Homework):   Symptoms: No change        Homework: Achieved / completed to satisfaction          Episode of Care Goals: Satisfactory progress - ACTION (Actively working towards change); Intervened by reinforcing change plan / affirming steps taken     Current / Ongoing Stressors and Concerns:   Ongoing: Anxiety and panic attacks while driving, history of family conflict  Current:  Client discussed challenges in her " business that have caused confusion and distress.      Treatment Objective(s) Addressed in This Session:    Patient will use cognitive strategies identified in therapy to identify and challenge negative self-talk / self-judgement.    Patient will identify three distraction and diversion activities and use those activities to decrease level of anxiety.          Intervention:   CBT:    Reflected and offered psychoeducation around cognitive distortions. Modeled challenge. Used open-ended questions and summary to assist with insight.    DBT: Distress Tolerance skills coaching: One Thing at a Time, Push Away      ASSESSMENT: Current Emotional / Mental Status (status of significant symptoms):   Risk status (Self / Other harm or suicidal ideation)   Patient denies current fears or concerns for personal safety.   Patient denies current or recent suicidal ideation or behaviors.   Patientdenies current or recent homicidal ideation or behaviors.   Patient denies current or recent self injurious behavior or ideation.   Patient denies other safety concerns.   Patient reports there has been no change in risk factors since their last session.     Patient reports there has been no change in protective factors since their last session.     Recommended that patient call 911 or go to the local ED should there be a change in any of these risk factors.     Appearance:   Not Assessed    Eye Contact:   Not Assessed    Psychomotor Behavior: Not Assessed    Attitude:   Cooperative    Orientation:   All   Speech    Rate / Production: Normal/ Responsive    Volume:  Normal    Mood:    Anxious    Affect:    Appropriate    Thought Content:  Clear    Thought Form:  Coherent  Logical      Insight:    Good        Medication Review:   No changes to current psychiatric medication(s)     Medication Compliance:   Yes     Changes in Health Issues:   None reported     Chemical Use Review:   Substance Use: Chemical use reviewed, no active concerns  identified      Tobacco Use: No current tobacco use.      Diagnosis:  1. Generalized anxiety disorder         Collateral Reports Completed:   Not Applicable      PLAN: (Patient Tasks / Therapist Tasks / Other)  HOMEWORK:  One Thing at a Time practice      Eunice Mixon MA, [facility]                                                          ______________________________________________________________________    Treatment Plan    Patient's Name: Tracee Cooper  YOB: 1970    Date: 7/13/22    Diagnoses:  300.02 (F41.1) Generalized Anxiety Disorder   (f41.0) Panic disorder without agoraphobia  Rule out Posttraumatic Stress Disorder  Psychosocial & Contextual Factors: Past trauma, children's mental health concerns, work stressors  WHODAS: Completed    Referral / Collaboration:  Referral to another professional/service is not indicated at this time.    Anticipated number of session or this episode of care: 20      MeasurableTreatment Goal(s) related to diagnosis / functional impairment(s)  Goal 1: Patient will decrease anxiety levels as evidence by ORVILLE-7 scores, and client report.    I will know I've met my goal when I have less panic attacks, when I can drive to a new place without feeling anxious or panicked.      Objective #A (Patient Action)    Patient will identify 3 initial signs or symptoms of anxiety.  Status: Continued - Date(s): 12/16/19, 11/2/20, 2/4/21, 5/17/21, 11/17/21,  3/30/22, 7/13/22    Intervention(s)  Therapist will assign homework    teach emotional recognition/identification.      Objective #B  Patient will use cognitive strategies identified in therapy to challenge anxious thoughts.  Status: Completed - Date: 8/25/21 and Restarted - Date: , 7/13/22     Intervention(s)  Therapist will assign homework    teach CBT techniques to identify and challenge cognitive distortions.    Objective #C  Patient will identify three distraction and diversion activities and use those activities to  decrease level of anxiety  .  Status: Completed - Date: 8/25/21 and Restarted - Date: , 7/13/22     Intervention(s)  Therapist will assign homework    teach DBT techniques including Mindfulness and Distress Tolerance.     Goal 2: Patient will increase sense of confidence and independence per client report.      Objective #A (Patient Action)    Patient will identify 3 ways in which she would like to increase sense of independence.  Status: New: 8/25/21, 11/17/21,  3/30/22, 7/13/22    Intervention(s)  Therapist will assign homework    teach emotional recognition/identification, teach values identification.      Objective #B  Patient will use cognitive strategies identified in therapy to identify and challenge negative self-talk / self-judgement.   Status: New: 8/25/21, 11/17/21,  3/30/22, 7/13/22    Intervention(s)  Therapist will assign homework    teach CBT techniques to identify and challenge cognitive distortions, teach positive affirmations    Objective #C  Patient will engage in daily practice of identifying a positive affirmation or personal success she feels proud of.  Status: New: 8/25/21, 11/17/21,  3/30/22, 7/13/22    Intervention(s)  Therapist will assign homework    teach CBT techniques        Patient has reviewed and agreed to the above plan.      Eunice Mixon MA, [facility]  11/9/22

## 2022-11-18 DIAGNOSIS — I10 ESSENTIAL HYPERTENSION WITH GOAL BLOOD PRESSURE LESS THAN 140/90: ICD-10-CM

## 2022-11-18 RX ORDER — VALSARTAN 80 MG/1
TABLET ORAL
Qty: 90 TABLET | OUTPATIENT
Start: 2022-11-18

## 2022-11-18 NOTE — TELEPHONE ENCOUNTER
Filled by Nghia 11/18/22 in separate encounter. Refusing RX and closing duplicate encounter.  Kika Parikh RN

## 2022-11-22 ENCOUNTER — VIRTUAL VISIT (OUTPATIENT)
Dept: PSYCHOLOGY | Facility: CLINIC | Age: 52
End: 2022-11-22
Payer: COMMERCIAL

## 2022-11-22 ENCOUNTER — MYC REFILL (OUTPATIENT)
Dept: FAMILY MEDICINE | Facility: CLINIC | Age: 52
End: 2022-11-22

## 2022-11-22 DIAGNOSIS — F41.1 GENERALIZED ANXIETY DISORDER: Primary | ICD-10-CM

## 2022-11-22 DIAGNOSIS — I10 ESSENTIAL HYPERTENSION WITH GOAL BLOOD PRESSURE LESS THAN 140/90: ICD-10-CM

## 2022-11-22 PROCEDURE — 90834 PSYTX W PT 45 MINUTES: CPT | Mod: 95 | Performed by: COUNSELOR

## 2022-11-22 RX ORDER — VALSARTAN 80 MG/1
80 TABLET ORAL DAILY
Qty: 30 TABLET | Refills: 0 | Status: CANCELLED | OUTPATIENT
Start: 2022-11-22

## 2022-11-22 NOTE — PROGRESS NOTES
Discharge Summary  Multiple Sessions    Client Name: Tracee Cooper MRN#: [id number] YOB: 1970    Discharge Date:   2022    Service Modality: Video Visit:      Provider verified identity through the following two step process.  Patient provided:  Patient     Telemedicine Visit: The patient's condition can be safely assessed and treated via synchronous audio and visual telemedicine encounter.      Reason for Telemedicine Visit: Services only offered telehealth    Originating Site (Patient Location): Patient's home    Distant Site (Provider Location): Provider Remote Setting- Home Office    Consent:  The patient/guardian has verbally consented to: the potential risks and benefits of telemedicine (video visit) versus in person care; bill my insurance or make self-payment for services provided; and responsibility for payment of non-covered services.     Patient would like the video invitation sent by:  My Chart    Mode of Communication:  Video Conference via [employer]    Distant Location (Provider):  Off-site    As the provider I attest to compliance with applicable laws and regulations related to telemedicine.    Service Type: Individual      Session Start Time: 2pm  Session End Time: 2:45      Session Length: 45 - 50     Session #: 47     Attendees: Client attended alone      Focus of Treatment Objective(s):  Client's presenting concerns included: Anxiety -    Stage of Change at time of Discharge: ACTION (Actively working towards change)    Medication Adherence:  Yes    Chemical Use:  No    Assessment: Current Emotional / Mental Status (status of significant symptoms):    Risk status (Self / Other harm or suicidal ideation)  Client denies current fears or concerns for personal safety.  Client denies current or recent suicidal ideation or behaviors.  Client denies current or recent homicidal ideation or behaviors.  Client denies current or recent self injurious behavior  or ideation.  Client denies other safety concerns.  A safety and risk management plan has not been developed at this time, however client was given the after-hours number should there be a change in any of these risk factors.    Appearance:   Appropriate   Eye Contact:   Good   Psychomotor Behavior: Normal   Attitude:   Cooperative   Orientation:   All  Speech   Rate / Production: Normal    Volume:  Normal   Mood:    Anxious  Depressed   Affect:    Appropriate   Thought Content:  Clear   Thought Form:  Coherent  Logical   Insight:   Good     DSM5 Diagnoses: (Sustained by DSM5 Criteria Listed Above)  Diagnoses:  300.02 (F41.1) Generalized Anxiety Disorder   (f41.0) Panic disorder without agoraphobia  Rule out Posttraumatic Stress Disorder  Psychosocial & Contextual Factors: Past trauma, children's mental health concerns, work stressors  WHODAS: Completed    Reason for Discharge:  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 Client to transition care to provider's new clinic.      Aftercare Plan:  Client may resume counseling services at any time in the future by calling the [facility] Intake Office, [phone].  Client plans to follow up with current provider at new clinic.      REBEKA Guevara  November 22, 2022

## 2022-11-25 NOTE — TELEPHONE ENCOUNTER
I filled this prescription 11/18 for 30 days since pt is due for lab and BP follow up as instructed on 11/18.    Please contact pharmacy, not sure why this is being requested?    Louise Agrawal, DO

## 2022-11-25 NOTE — TELEPHONE ENCOUNTER
Call placed to Patients pharmacy.   Pharmacy states that valsartan is still there, waiting for Patient to pick it up.   Call placed to Patient  No answer  Left message with call back number for Patient to return call  Alfonso Vines RN

## 2022-11-25 NOTE — TELEPHONE ENCOUNTER
Routing refill request to provider for review/approval because:  Failed protocol: 7/26/22 B/P 168/98  Alfonso Vines RN

## 2022-12-26 DIAGNOSIS — I10 ESSENTIAL HYPERTENSION WITH GOAL BLOOD PRESSURE LESS THAN 140/90: ICD-10-CM

## 2022-12-27 RX ORDER — VALSARTAN 80 MG/1
TABLET ORAL
Qty: 90 TABLET | OUTPATIENT
Start: 2022-12-27

## 2022-12-27 NOTE — TELEPHONE ENCOUNTER
Prescription was filled yesterday.  She has been overdue for follow up.  I will fill a 3 month supply after we confirm BP and labs.  She should not miss any doses before her follow up on 1/3.    Louise Agrawal, DO

## 2023-01-06 ENCOUNTER — MYC REFILL (OUTPATIENT)
Dept: FAMILY MEDICINE | Facility: CLINIC | Age: 53
End: 2023-01-06

## 2023-01-06 DIAGNOSIS — F41.1 GENERALIZED ANXIETY DISORDER: ICD-10-CM

## 2023-01-06 RX ORDER — ALPRAZOLAM 1 MG
TABLET ORAL
Qty: 20 TABLET | Refills: 0 | Status: SHIPPED | OUTPATIENT
Start: 2023-01-06 | End: 2023-05-08

## 2023-01-16 ENCOUNTER — MYC MEDICAL ADVICE (OUTPATIENT)
Dept: FAMILY MEDICINE | Facility: CLINIC | Age: 53
End: 2023-01-16

## 2023-01-18 ENCOUNTER — ALLIED HEALTH/NURSE VISIT (OUTPATIENT)
Dept: FAMILY MEDICINE | Facility: CLINIC | Age: 53
End: 2023-01-18
Payer: COMMERCIAL

## 2023-01-18 ENCOUNTER — LAB (OUTPATIENT)
Dept: LAB | Facility: CLINIC | Age: 53
End: 2023-01-18
Payer: COMMERCIAL

## 2023-01-18 VITALS — SYSTOLIC BLOOD PRESSURE: 138 MMHG | DIASTOLIC BLOOD PRESSURE: 88 MMHG

## 2023-01-18 DIAGNOSIS — I10 ESSENTIAL HYPERTENSION WITH GOAL BLOOD PRESSURE LESS THAN 140/90: ICD-10-CM

## 2023-01-18 DIAGNOSIS — Z01.30 BLOOD PRESSURE CHECK: Primary | ICD-10-CM

## 2023-01-18 LAB
ANION GAP SERPL CALCULATED.3IONS-SCNC: 8 MMOL/L (ref 7–15)
BUN SERPL-MCNC: 12 MG/DL (ref 6–20)
CALCIUM SERPL-MCNC: 10.2 MG/DL (ref 8.6–10)
CHLORIDE SERPL-SCNC: 100 MMOL/L (ref 98–107)
CREAT SERPL-MCNC: 0.72 MG/DL (ref 0.51–0.95)
DEPRECATED HCO3 PLAS-SCNC: 29 MMOL/L (ref 22–29)
GFR SERPL CREATININE-BSD FRML MDRD: >90 ML/MIN/1.73M2
GLUCOSE SERPL-MCNC: 119 MG/DL (ref 70–99)
POTASSIUM SERPL-SCNC: 4.1 MMOL/L (ref 3.4–5.3)
SODIUM SERPL-SCNC: 137 MMOL/L (ref 136–145)

## 2023-01-18 PROCEDURE — 80048 BASIC METABOLIC PNL TOTAL CA: CPT

## 2023-01-18 PROCEDURE — 99207 PR NO CHARGE NURSE ONLY: CPT

## 2023-01-18 PROCEDURE — 36415 COLL VENOUS BLD VENIPUNCTURE: CPT

## 2023-01-18 NOTE — PROGRESS NOTES
Patient here today for a blood pressure check. She takes her BP medication every day around noon. Her blood pressure was 136/88 and 138/88. She is not feeling dizzy or lightheaded. Provider NERY Sidhu CMA

## 2023-01-24 DIAGNOSIS — I10 ESSENTIAL HYPERTENSION WITH GOAL BLOOD PRESSURE LESS THAN 140/90: ICD-10-CM

## 2023-01-26 DIAGNOSIS — I10 ESSENTIAL HYPERTENSION WITH GOAL BLOOD PRESSURE LESS THAN 140/90: ICD-10-CM

## 2023-01-26 RX ORDER — VALSARTAN 80 MG/1
TABLET ORAL
Qty: 30 TABLET | Refills: 0 | Status: SHIPPED | OUTPATIENT
Start: 2023-01-26 | End: 2023-03-02

## 2023-01-26 NOTE — TELEPHONE ENCOUNTER
"Last Written Prescription Date: 12/26/22  Last Fill Quantity: 30, # refills: 0  Last office visit provider: 7/26/22        Requested Prescriptions   Pending Prescriptions Disp Refills     valsartan (DIOVAN) 80 MG tablet [Pharmacy Med Name: VALSARTAN 80MG TABLETS] 30 tablet 0     Sig: TAKE 1 TABLET(80 MG) BY MOUTH DAILY       Angiotensin-II Receptors Passed - 1/24/2023 11:54 AM        Passed - Last blood pressure under 140/90 in past 12 months     BP Readings from Last 3 Encounters:   01/18/23 138/88   07/26/22 (!) 168/98   07/07/22 (!) 169/118                 Passed - Recent (12 mo) or future (30 days) visit within the authorizing provider's specialty     Patient has had an office visit with the authorizing provider or a provider within the authorizing providers department within the previous 12 mos or has a future within next 30 days. See \"Patient Info\" tab in inbasket, or \"Choose Columns\" in Meds & Orders section of the refill encounter.              Passed - Medication is active on med list        Passed - Patient is age 18 or older        Passed - No active pregnancy on record        Passed - Normal serum creatinine on file in past 12 months     Recent Labs   Lab Test 01/18/23  1110   CR 0.72       Ok to refill medication if creatinine is low          Passed - Normal serum potassium on file in past 12 months     Recent Labs   Lab Test 01/18/23  1110   POTASSIUM 4.1                    Passed - No positive pregnancy test in past 12 months                 Kika Parikh RN 01/26/23 3:06 PM  "

## 2023-01-27 RX ORDER — VALSARTAN 80 MG/1
TABLET ORAL
Qty: 90 TABLET | OUTPATIENT
Start: 2023-01-27

## 2023-03-02 ENCOUNTER — MYC REFILL (OUTPATIENT)
Dept: FAMILY MEDICINE | Facility: CLINIC | Age: 53
End: 2023-03-02
Payer: COMMERCIAL

## 2023-03-02 DIAGNOSIS — I10 ESSENTIAL HYPERTENSION WITH GOAL BLOOD PRESSURE LESS THAN 140/90: ICD-10-CM

## 2023-03-03 DIAGNOSIS — I10 ESSENTIAL HYPERTENSION WITH GOAL BLOOD PRESSURE LESS THAN 140/90: ICD-10-CM

## 2023-03-03 RX ORDER — VALSARTAN 80 MG/1
80 TABLET ORAL DAILY
Qty: 30 TABLET | Refills: 0 | Status: SHIPPED | OUTPATIENT
Start: 2023-03-03 | End: 2023-04-03

## 2023-03-03 RX ORDER — VALSARTAN 80 MG/1
TABLET ORAL
Qty: 90 TABLET | OUTPATIENT
Start: 2023-03-03

## 2023-03-03 NOTE — TELEPHONE ENCOUNTER
"Last Written Prescription Date: 1/26/23  Last Fill Quantity: 30, # refills: 0  Last office visit provider: 7/26/22        Requested Prescriptions   Pending Prescriptions Disp Refills     valsartan (DIOVAN) 80 MG tablet 30 tablet 0     Sig: Take 1 tablet (80 mg) by mouth daily       Angiotensin-II Receptors Passed - 3/2/2023  9:42 AM        Passed - Last blood pressure under 140/90 in past 12 months     BP Readings from Last 3 Encounters:   01/18/23 138/88   07/26/22 (!) 168/98   07/07/22 (!) 169/118                 Passed - Recent (12 mo) or future (30 days) visit within the authorizing provider's specialty     Patient has had an office visit with the authorizing provider or a provider within the authorizing providers department within the previous 12 mos or has a future within next 30 days. See \"Patient Info\" tab in inbasket, or \"Choose Columns\" in Meds & Orders section of the refill encounter.              Passed - Medication is active on med list        Passed - Patient is age 18 or older        Passed - No active pregnancy on record        Passed - Normal serum creatinine on file in past 12 months     Recent Labs   Lab Test 01/18/23  1110   CR 0.72       Ok to refill medication if creatinine is low          Passed - Normal serum potassium on file in past 12 months     Recent Labs   Lab Test 01/18/23  1110   POTASSIUM 4.1                    Passed - No positive pregnancy test in past 12 months                 Kika Parikh RN 03/03/23 10:37 AM  "

## 2023-03-03 NOTE — TELEPHONE ENCOUNTER
Filled 3/3/23 by Louise Agrawal MD.  Refusing Rx and closing duplicate encounter.  Kika Parikh RN

## 2023-04-08 ENCOUNTER — HEALTH MAINTENANCE LETTER (OUTPATIENT)
Age: 53
End: 2023-04-08

## 2023-05-08 ENCOUNTER — MYC REFILL (OUTPATIENT)
Dept: FAMILY MEDICINE | Facility: CLINIC | Age: 53
End: 2023-05-08
Payer: COMMERCIAL

## 2023-05-08 DIAGNOSIS — F41.1 GENERALIZED ANXIETY DISORDER: ICD-10-CM

## 2023-05-09 RX ORDER — ALPRAZOLAM 1 MG
TABLET ORAL
Qty: 20 TABLET | Refills: 0 | Status: SHIPPED | OUTPATIENT
Start: 2023-05-09 | End: 2023-08-10

## 2023-05-09 NOTE — TELEPHONE ENCOUNTER
Routing refill request to provider for review/approval because:  Drug not on the [facility] refill protocol   Alfonso Vines RN

## 2023-06-01 ENCOUNTER — HEALTH MAINTENANCE LETTER (OUTPATIENT)
Age: 53
End: 2023-06-01

## 2023-07-11 ENCOUNTER — MYC REFILL (OUTPATIENT)
Dept: FAMILY MEDICINE | Facility: CLINIC | Age: 53
End: 2023-07-11
Payer: COMMERCIAL

## 2023-07-11 DIAGNOSIS — I10 ESSENTIAL HYPERTENSION WITH GOAL BLOOD PRESSURE LESS THAN 140/90: ICD-10-CM

## 2023-07-13 RX ORDER — VALSARTAN 80 MG/1
80 TABLET ORAL DAILY
Qty: 90 TABLET | Refills: 0 | Status: SHIPPED | OUTPATIENT
Start: 2023-07-13 | End: 2023-08-22

## 2023-08-10 ENCOUNTER — MYC REFILL (OUTPATIENT)
Dept: FAMILY MEDICINE | Facility: CLINIC | Age: 53
End: 2023-08-10
Payer: COMMERCIAL

## 2023-08-10 DIAGNOSIS — F41.1 GENERALIZED ANXIETY DISORDER: ICD-10-CM

## 2023-08-11 RX ORDER — ALPRAZOLAM 1 MG
TABLET ORAL
Qty: 20 TABLET | Refills: 0 | Status: SHIPPED | OUTPATIENT
Start: 2023-08-11 | End: 2024-01-17

## 2023-08-11 NOTE — TELEPHONE ENCOUNTER
Routing refill request to provider for review/approval because:  Drug not on the [facility] refill protocol       Requested Prescriptions   Pending Prescriptions Disp Refills    ALPRAZolam (XANAX) 1 MG tablet 20 tablet 0     Sig: TAKE 1 TABLET BY MOUTH THREE TIMES A DAY AS NEEDED FOR ANXIETY. USE SPARINGLY       There is no refill protocol information for this order              Marcio ELIDIA Rodrigues RN 08/11/23 7:50 AM

## 2023-08-22 ENCOUNTER — OFFICE VISIT (OUTPATIENT)
Dept: FAMILY MEDICINE | Facility: CLINIC | Age: 53
End: 2023-08-22
Payer: COMMERCIAL

## 2023-08-22 VITALS
WEIGHT: 167 LBS | DIASTOLIC BLOOD PRESSURE: 84 MMHG | OXYGEN SATURATION: 96 % | TEMPERATURE: 98.1 F | HEART RATE: 102 BPM | HEIGHT: 64 IN | RESPIRATION RATE: 20 BRPM | BODY MASS INDEX: 28.51 KG/M2 | SYSTOLIC BLOOD PRESSURE: 132 MMHG

## 2023-08-22 DIAGNOSIS — E78.5 HYPERLIPIDEMIA LDL GOAL <130: ICD-10-CM

## 2023-08-22 DIAGNOSIS — I10 ESSENTIAL HYPERTENSION WITH GOAL BLOOD PRESSURE LESS THAN 140/90: Primary | ICD-10-CM

## 2023-08-22 DIAGNOSIS — Z12.31 VISIT FOR SCREENING MAMMOGRAM: ICD-10-CM

## 2023-08-22 DIAGNOSIS — F41.1 GENERALIZED ANXIETY DISORDER: ICD-10-CM

## 2023-08-22 LAB
ALBUMIN SERPL BCG-MCNC: 4.5 G/DL (ref 3.5–5.2)
ALP SERPL-CCNC: 79 U/L (ref 35–104)
ALT SERPL W P-5'-P-CCNC: 47 U/L (ref 0–50)
ANION GAP SERPL CALCULATED.3IONS-SCNC: 14 MMOL/L (ref 7–15)
AST SERPL W P-5'-P-CCNC: 41 U/L (ref 0–45)
BILIRUB SERPL-MCNC: 0.2 MG/DL
BUN SERPL-MCNC: 16.2 MG/DL (ref 6–20)
CALCIUM SERPL-MCNC: 10.4 MG/DL (ref 8.6–10)
CHLORIDE SERPL-SCNC: 101 MMOL/L (ref 98–107)
CHOLEST SERPL-MCNC: 287 MG/DL
CREAT SERPL-MCNC: 0.73 MG/DL (ref 0.51–0.95)
DEPRECATED HCO3 PLAS-SCNC: 23 MMOL/L (ref 22–29)
GFR SERPL CREATININE-BSD FRML MDRD: >90 ML/MIN/1.73M2
GLUCOSE SERPL-MCNC: 88 MG/DL (ref 70–99)
HDLC SERPL-MCNC: 34 MG/DL
LDLC SERPL CALC-MCNC: ABNORMAL MG/DL
NONHDLC SERPL-MCNC: 253 MG/DL
POTASSIUM SERPL-SCNC: 4 MMOL/L (ref 3.4–5.3)
PROT SERPL-MCNC: 7 G/DL (ref 6.4–8.3)
SODIUM SERPL-SCNC: 138 MMOL/L (ref 136–145)
TRIGL SERPL-MCNC: 542 MG/DL

## 2023-08-22 PROCEDURE — 80061 LIPID PANEL: CPT | Performed by: FAMILY MEDICINE

## 2023-08-22 PROCEDURE — 99214 OFFICE O/P EST MOD 30 MIN: CPT | Mod: 25 | Performed by: FAMILY MEDICINE

## 2023-08-22 PROCEDURE — 90471 IMMUNIZATION ADMIN: CPT | Performed by: FAMILY MEDICINE

## 2023-08-22 PROCEDURE — 36415 COLL VENOUS BLD VENIPUNCTURE: CPT | Performed by: FAMILY MEDICINE

## 2023-08-22 PROCEDURE — 80053 COMPREHEN METABOLIC PANEL: CPT | Performed by: FAMILY MEDICINE

## 2023-08-22 PROCEDURE — 83721 ASSAY OF BLOOD LIPOPROTEIN: CPT | Performed by: FAMILY MEDICINE

## 2023-08-22 PROCEDURE — 90715 TDAP VACCINE 7 YRS/> IM: CPT | Performed by: FAMILY MEDICINE

## 2023-08-22 RX ORDER — VENLAFAXINE HYDROCHLORIDE 150 MG/1
150 CAPSULE, EXTENDED RELEASE ORAL DAILY
Qty: 90 CAPSULE | Refills: 3 | Status: SHIPPED | OUTPATIENT
Start: 2023-08-22 | End: 2024-09-10

## 2023-08-22 RX ORDER — VALSARTAN 80 MG/1
80 TABLET ORAL DAILY
Qty: 90 TABLET | Refills: 3 | Status: SHIPPED | OUTPATIENT
Start: 2023-08-22 | End: 2024-09-10

## 2023-08-22 RX ORDER — HYDROCHLOROTHIAZIDE 25 MG/1
25 TABLET ORAL DAILY
Qty: 90 TABLET | Refills: 3 | Status: SHIPPED | OUTPATIENT
Start: 2023-08-22 | End: 2024-09-10

## 2023-08-22 ASSESSMENT — ANXIETY QUESTIONNAIRES
6. BECOMING EASILY ANNOYED OR IRRITABLE: SEVERAL DAYS
GAD7 TOTAL SCORE: 5
3. WORRYING TOO MUCH ABOUT DIFFERENT THINGS: SEVERAL DAYS
GAD7 TOTAL SCORE: 5
2. NOT BEING ABLE TO STOP OR CONTROL WORRYING: SEVERAL DAYS
7. FEELING AFRAID AS IF SOMETHING AWFUL MIGHT HAPPEN: SEVERAL DAYS
1. FEELING NERVOUS, ANXIOUS, OR ON EDGE: SEVERAL DAYS
5. BEING SO RESTLESS THAT IT IS HARD TO SIT STILL: NOT AT ALL

## 2023-08-22 ASSESSMENT — PATIENT HEALTH QUESTIONNAIRE - PHQ9
5. POOR APPETITE OR OVEREATING: NOT AT ALL
SUM OF ALL RESPONSES TO PHQ QUESTIONS 1-9: 2

## 2023-08-22 NOTE — PROGRESS NOTES
A/P:      ICD-10-CM    1. Essential hypertension with goal blood pressure less than 140/90  I10 valsartan (DIOVAN) 80 MG tablet     hydrochlorothiazide (HYDRODIURIL) 25 MG tablet     Comprehensive metabolic panel (BMP + Alb, Alk Phos, ALT, AST, Total. Bili, TP)     Comprehensive metabolic panel (BMP + Alb, Alk Phos, ALT, AST, Total. Bili, TP)      2. Hyperlipidemia LDL goal <130  E78.5 Lipid panel reflex to direct LDL Non-fasting     Comprehensive metabolic panel (BMP + Alb, Alk Phos, ALT, AST, Total. Bili, TP)     Lipid panel reflex to direct LDL Non-fasting     Comprehensive metabolic panel (BMP + Alb, Alk Phos, ALT, AST, Total. Bili, TP)     LDL cholesterol direct      3. Generalized anxiety disorder  F41.1 venlafaxine (EFFEXOR XR) 150 MG 24 hr capsule      4. Visit for screening mammogram  Z12.31 MA SCREENING DIGITAL BILAT - Future  (s+30)        HTN:  well controlled, continue current meds    Lipids:  not fasting today.  Reviewed previously elevated results.  Discussed role of medication to reduce vascular risk.  Pt not interested in considering at this time    ORVILLE:  well controlled with rare alprazolam use        Kelley Schneider is a 53 year old, presenting for the following health issues:  Hypertension      History of Present Illness       Hypertension: She presents for follow up of hypertension.  She does check blood pressure  regularly outside of the clinic. Outpatient blood pressures have not been over 140/90. She does not follow a low salt diet.     She eats 2-3 servings of fruits and vegetables daily.She consumes 0 sweetened beverage(s) daily.She exercises with enough effort to increase her heart rate 10 to 19 minutes per day.  She exercises with enough effort to increase her heart rate 4 days per week.   She is taking medications regularly.     Tolerating medications well, no concerns.  No missed doses.    Feels anxiety is well controlled, rarely needing alprazolam      Review of Systems  "  Constitutional, HEENT, cardiovascular, pulmonary, gi and gu systems are negative, except as otherwise noted.      Objective    /84   Pulse 102   Temp 98.1  F (36.7  C) (Tympanic)   Resp 20   Ht 1.613 m (5' 3.5\")   Wt 75.8 kg (167 lb)   SpO2 96%   BMI 29.12 kg/m    Body mass index is 29.12 kg/m .  Physical Exam   PE:  VS as above   Gen:  WN/WD/WH female in NAD   Heart:  RRR without murmur, nl S1, S2, no rubs or gallops   Lungs CTA irish without rales/ronchi/wheezes   Ext:  No pedal edema  Psych: Alert and oriented times 3; coherent speech, normal   rate and volume, able to articulate logical thoughts, able   to abstract reason, no tangential thoughts, no hallucinations   or delusions  Her affect is bright and appropriate      Epic reviewed                "

## 2023-08-23 LAB — LDLC SERPL DIRECT ASSAY-MCNC: 173 MG/DL

## 2024-01-17 ENCOUNTER — MYC REFILL (OUTPATIENT)
Dept: FAMILY MEDICINE | Facility: CLINIC | Age: 54
End: 2024-01-17
Payer: COMMERCIAL

## 2024-01-17 DIAGNOSIS — F41.1 GENERALIZED ANXIETY DISORDER: ICD-10-CM

## 2024-01-17 NOTE — TELEPHONE ENCOUNTER
Pending Prescriptions:                       Disp   Refills    ALPRAZolam (XANAX) 1 MG tablet            20 tab*0            Sig: TAKE 1 TABLET BY MOUTH THREE TIMES A DAY AS           NEEDED FOR ANXIETY. USE SPARINGLY    Routing refill request to provider for review/approval because:  Drug not on the [facility] refill protocol       Naresh Blanca RN

## 2024-01-19 RX ORDER — ALPRAZOLAM 1 MG
TABLET ORAL
Qty: 20 TABLET | Refills: 0 | Status: SHIPPED | OUTPATIENT
Start: 2024-01-19 | End: 2024-01-23

## 2024-01-19 RX ORDER — ALPRAZOLAM 1 MG
TABLET ORAL
Qty: 20 TABLET | Refills: 0 | Status: SHIPPED | OUTPATIENT
Start: 2024-01-19 | End: 2024-01-19

## 2024-01-22 ENCOUNTER — MYC REFILL (OUTPATIENT)
Dept: FAMILY MEDICINE | Facility: CLINIC | Age: 54
End: 2024-01-22
Payer: COMMERCIAL

## 2024-01-22 DIAGNOSIS — F41.1 GENERALIZED ANXIETY DISORDER: ICD-10-CM

## 2024-01-23 RX ORDER — ALPRAZOLAM 1 MG
TABLET ORAL
Qty: 20 TABLET | Refills: 0 | OUTPATIENT
Start: 2024-01-23

## 2024-01-23 RX ORDER — ALPRAZOLAM 1 MG
TABLET ORAL
Qty: 20 TABLET | Refills: 0 | Status: SHIPPED | OUTPATIENT
Start: 2024-01-23 | End: 2024-05-17

## 2024-01-23 NOTE — TELEPHONE ENCOUNTER
Dr. Agrawal,  It looks like the transmission on the 1/19/2024 script failed to send. Patient is still waiting for a refill to be sent over (see mychart)  Could you please send this ASAP.    Thank you,  Nando HARDY RN  [facility] Nurse

## 2024-05-17 ENCOUNTER — MYC REFILL (OUTPATIENT)
Dept: FAMILY MEDICINE | Facility: CLINIC | Age: 54
End: 2024-05-17
Payer: COMMERCIAL

## 2024-05-17 DIAGNOSIS — F41.1 GENERALIZED ANXIETY DISORDER: ICD-10-CM

## 2024-05-20 RX ORDER — ALPRAZOLAM 1 MG
TABLET ORAL
Qty: 20 TABLET | Refills: 0 | Status: SHIPPED | OUTPATIENT
Start: 2024-05-20 | End: 2024-08-08

## 2024-06-09 ENCOUNTER — HEALTH MAINTENANCE LETTER (OUTPATIENT)
Age: 54
End: 2024-06-09

## 2024-08-07 ENCOUNTER — MYC REFILL (OUTPATIENT)
Dept: FAMILY MEDICINE | Facility: CLINIC | Age: 54
End: 2024-08-07
Payer: COMMERCIAL

## 2024-08-07 DIAGNOSIS — F41.1 GENERALIZED ANXIETY DISORDER: ICD-10-CM

## 2024-08-07 RX ORDER — ALPRAZOLAM 1 MG
TABLET ORAL
Qty: 20 TABLET | Refills: 0 | OUTPATIENT
Start: 2024-08-07

## 2024-08-07 NOTE — TELEPHONE ENCOUNTER
Overdue for needed care. Please call to schedule ORVILLE follow up. Once appt is scheduled, route back to the pool.    Julie Behrendt RN

## 2024-08-09 RX ORDER — ALPRAZOLAM 1 MG
TABLET ORAL
Qty: 20 TABLET | Refills: 0 | Status: SHIPPED | OUTPATIENT
Start: 2024-08-09

## 2024-08-24 DIAGNOSIS — Z20.818 PERTUSSIS EXPOSURE: Primary | ICD-10-CM

## 2024-08-24 RX ORDER — AZITHROMYCIN 250 MG/1
TABLET, FILM COATED ORAL
Qty: 6 TABLET | Refills: 0 | Status: SHIPPED | OUTPATIENT
Start: 2024-08-24 | End: 2024-08-29

## 2024-09-10 ENCOUNTER — OFFICE VISIT (OUTPATIENT)
Dept: FAMILY MEDICINE | Facility: CLINIC | Age: 54
End: 2024-09-10
Payer: COMMERCIAL

## 2024-09-10 VITALS
SYSTOLIC BLOOD PRESSURE: 148 MMHG | HEIGHT: 64 IN | OXYGEN SATURATION: 96 % | WEIGHT: 163 LBS | TEMPERATURE: 98.8 F | BODY MASS INDEX: 27.83 KG/M2 | RESPIRATION RATE: 18 BRPM | HEART RATE: 113 BPM | DIASTOLIC BLOOD PRESSURE: 100 MMHG

## 2024-09-10 DIAGNOSIS — I10 ESSENTIAL HYPERTENSION WITH GOAL BLOOD PRESSURE LESS THAN 140/90: Primary | ICD-10-CM

## 2024-09-10 DIAGNOSIS — I10 ESSENTIAL HYPERTENSION WITH GOAL BLOOD PRESSURE LESS THAN 140/90: ICD-10-CM

## 2024-09-10 DIAGNOSIS — E78.5 HYPERLIPIDEMIA LDL GOAL <130: ICD-10-CM

## 2024-09-10 DIAGNOSIS — N95.2 ATROPHIC VAGINITIS: ICD-10-CM

## 2024-09-10 DIAGNOSIS — Z12.31 VISIT FOR SCREENING MAMMOGRAM: ICD-10-CM

## 2024-09-10 DIAGNOSIS — F41.1 GENERALIZED ANXIETY DISORDER: ICD-10-CM

## 2024-09-10 LAB
ALBUMIN SERPL BCG-MCNC: 4.9 G/DL (ref 3.5–5.2)
ALP SERPL-CCNC: 81 U/L (ref 40–150)
ALT SERPL W P-5'-P-CCNC: 45 U/L (ref 0–50)
ANION GAP SERPL CALCULATED.3IONS-SCNC: 15 MMOL/L (ref 7–15)
AST SERPL W P-5'-P-CCNC: 58 U/L (ref 0–45)
BILIRUB SERPL-MCNC: 0.4 MG/DL
BUN SERPL-MCNC: 12 MG/DL (ref 6–20)
CALCIUM SERPL-MCNC: 10.6 MG/DL (ref 8.8–10.4)
CHLORIDE SERPL-SCNC: 95 MMOL/L (ref 98–107)
CHOLEST SERPL-MCNC: 301 MG/DL
CREAT SERPL-MCNC: 0.73 MG/DL (ref 0.51–0.95)
EGFRCR SERPLBLD CKD-EPI 2021: >90 ML/MIN/1.73M2
FASTING STATUS PATIENT QL REPORTED: YES
FASTING STATUS PATIENT QL REPORTED: YES
GLUCOSE SERPL-MCNC: 109 MG/DL (ref 70–99)
HCO3 SERPL-SCNC: 27 MMOL/L (ref 22–29)
HDLC SERPL-MCNC: 39 MG/DL
LDLC SERPL CALC-MCNC: 183 MG/DL
NONHDLC SERPL-MCNC: 262 MG/DL
POTASSIUM SERPL-SCNC: 4.2 MMOL/L (ref 3.4–5.3)
PROT SERPL-MCNC: 8 G/DL (ref 6.4–8.3)
SODIUM SERPL-SCNC: 137 MMOL/L (ref 135–145)
TRIGL SERPL-MCNC: 396 MG/DL

## 2024-09-10 PROCEDURE — 80061 LIPID PANEL: CPT | Performed by: FAMILY MEDICINE

## 2024-09-10 PROCEDURE — 99214 OFFICE O/P EST MOD 30 MIN: CPT | Performed by: FAMILY MEDICINE

## 2024-09-10 PROCEDURE — 36415 COLL VENOUS BLD VENIPUNCTURE: CPT | Performed by: FAMILY MEDICINE

## 2024-09-10 PROCEDURE — 80053 COMPREHEN METABOLIC PANEL: CPT | Performed by: FAMILY MEDICINE

## 2024-09-10 RX ORDER — VALSARTAN 80 MG/1
80 TABLET ORAL DAILY
Qty: 90 TABLET | Refills: 3 | Status: CANCELLED | OUTPATIENT
Start: 2024-09-10

## 2024-09-10 RX ORDER — HYDROCHLOROTHIAZIDE 25 MG/1
25 TABLET ORAL DAILY
Qty: 90 TABLET | Refills: 3 | Status: SHIPPED | OUTPATIENT
Start: 2024-09-10

## 2024-09-10 RX ORDER — VENLAFAXINE HYDROCHLORIDE 150 MG/1
150 CAPSULE, EXTENDED RELEASE ORAL DAILY
Qty: 90 CAPSULE | Refills: 3 | Status: SHIPPED | OUTPATIENT
Start: 2024-09-10

## 2024-09-10 RX ORDER — ESTRADIOL 10 UG/1
10 INSERT VAGINAL
Qty: 24 TABLET | Refills: 3 | Status: SHIPPED | OUTPATIENT
Start: 2024-09-12 | End: 2024-09-11

## 2024-09-10 RX ORDER — VALSARTAN 160 MG/1
160 TABLET ORAL DAILY
Qty: 30 TABLET | Refills: 0 | Status: SHIPPED | OUTPATIENT
Start: 2024-09-10 | End: 2024-09-11

## 2024-09-10 NOTE — PROGRESS NOTES
A/P:      ICD-10-CM    1. Essential hypertension with goal blood pressure less than 140/90  I10 hydrochlorothiazide (HYDRODIURIL) 25 MG tablet     Comprehensive metabolic panel (BMP + Alb, Alk Phos, ALT, AST, Total. Bili, TP)     Basic metabolic panel  (Ca, Cl, CO2, Creat, Gluc, K, Na, BUN)     Comprehensive metabolic panel (BMP + Alb, Alk Phos, ALT, AST, Total. Bili, TP)     DISCONTINUED: valsartan (DIOVAN) 160 MG tablet      2. Generalized anxiety disorder  F41.1 venlafaxine (EFFEXOR XR) 150 MG 24 hr capsule      3. Hyperlipidemia LDL goal <130  E78.5 Lipid panel reflex to direct LDL Fasting     Comprehensive metabolic panel (BMP + Alb, Alk Phos, ALT, AST, Total. Bili, TP)     Lipid panel reflex to direct LDL Fasting     Comprehensive metabolic panel (BMP + Alb, Alk Phos, ALT, AST, Total. Bili, TP)      4. Atrophic vaginitis  N95.2 DISCONTINUED: estradiol (VAGIFEM) 10 MCG TABS vaginal tablet      5. BMI 27.0-27.9,adult  Z68.27       6. Visit for screening mammogram  Z12.31 MA Screening Bilateral w/ Dillan        HTN:  uncontrolled.  Increase valsartan to 160mg daily.  BP check and nonfasting labs in 2 weeks.    ORVILLE:  controlled, continue current meds    Lipids:  very elevated at last check.  Reviewed ASCVD recommendations today.  Pt wishes to do labs today and willing to begin med if ASCDV risk >7.5%    Atrophic vaginitis:  reviewed risks and benefits of vaginal estrogen, prescription sent.  Pt will reach out with update in 4-6 weeks.    BMI:  >27 with HTN and lipids does meet criteria for med management.  No contraindications to GLP1.  Reviewed side effects.  Pt will check with insurance regarding coverage    Subjective   Jennie is a 54 year old, presenting for the following health issues:  Hypertension and Anxiety        9/10/2024    11:27 AM   Additional Questions   Roomed by Lin FERRELL   Accompanied by Self      History of Present Illness       Hypertension: She presents for follow up of hypertension.  She does  "check blood pressure  regularly outside of the clinic. Outpatient blood pressures have not been over 140/90. She does not follow a low salt diet.     She eats 2-3 servings of fruits and vegetables daily.She consumes 0 sweetened beverage(s) daily.She exercises with enough effort to increase her heart rate 10 to 19 minutes per day.  She exercises with enough effort to increase her heart rate 3 or less days per week.   She is taking medications regularly.       Home readings 130's/90's  checks every so often.    The 10-year ASCVD risk score (Clark BERRY, et al., 2019) is: 8.5%    Values used to calculate the score:      Age: 54 years      Sex: Female      Is Non- : No      Diabetic: No      Tobacco smoker: No      Systolic Blood Pressure: 148 mmHg      Is BP treated: Yes      HDL Cholesterol: 34 mg/dL      Total Cholesterol: 287 mg/dL    Takes xanax 1-2 times per month, in general feels like her anxiety is well controlled.    Worried that her weight is contributing to both her elevated BP and high cholesterol.  States these things were never an issue when she weighed less and were not an issue for other family members of normal weight.  Daughter is currently taking wegovy and wonders if she can start medicine to decrease BP and lipids.      Also wondering about vaginal dryness.  Has been uncomfortable consistently and with intercourse.  Not interested in OCT options.            Review of Systems  Constitutional, HEENT, cardiovascular, pulmonary, gi and gu systems are negative, except as otherwise noted.      Objective    BP (!) 148/100   Pulse 113   Temp 98.8  F (37.1  C) (Tympanic)   Resp 18   Ht 1.626 m (5' 4\")   Wt 73.9 kg (163 lb)   SpO2 96%   BMI 27.98 kg/m    Body mass index is 27.98 kg/m .  Physical Exam   GENERAL: alert and no distress  RESP: lungs clear to auscultation - no rales, rhonchi or wheezes  CV: regular rate and rhythm, normal S1 S2, no S3 or S4, no murmur, click or rub, " no peripheral edema  MS: no gross musculoskeletal defects noted, no edema  NEURO: Normal strength and tone, mentation intact and speech normal  PSYCH: mentation appears normal, affect normal/bright    Epic reviewed        Signed Electronically by: Louise Agrawal DO

## 2024-09-10 NOTE — PATIENT INSTRUCTIONS
For your weight:  you can check with your insurance to see if they cover Zepbound or Wegovy for weight loss.      For your blood pressure:  we'll increase the valsartan dose.  New prescription at your pharmacy.  Please schedule a lab visit in 2-3 weeks for nonfasting blood work.  Please also send me your home blood pressure readings at that time    For the cholesterol:  we'll check today, I'll send a medicine and let you know in the result note is needed      For the vaginal dryness:  vaginal estrogen tabs.  Given it 8 weeks or so and let me know what you think

## 2024-09-11 RX ORDER — ESTRADIOL 10 UG/1
INSERT VAGINAL
Qty: 25 TABLET | Refills: 3 | Status: SHIPPED | OUTPATIENT
Start: 2024-09-11

## 2024-09-11 RX ORDER — VALSARTAN 160 MG/1
160 TABLET ORAL DAILY
Qty: 90 TABLET | Refills: 0 | Status: SHIPPED | OUTPATIENT
Start: 2024-09-11

## 2024-09-12 ENCOUNTER — MYC MEDICAL ADVICE (OUTPATIENT)
Dept: FAMILY MEDICINE | Facility: CLINIC | Age: 54
End: 2024-09-12
Payer: COMMERCIAL

## 2024-09-12 DIAGNOSIS — E78.5 HYPERLIPIDEMIA LDL GOAL <100: Primary | ICD-10-CM

## 2024-09-12 DIAGNOSIS — I10 ESSENTIAL HYPERTENSION WITH GOAL BLOOD PRESSURE LESS THAN 140/90: ICD-10-CM

## 2024-09-12 DIAGNOSIS — E66.3 OVERWEIGHT WITH BODY MASS INDEX (BMI) OF 27 TO 27.9 IN ADULT: ICD-10-CM

## 2024-09-12 DIAGNOSIS — R79.89 ABNORMAL LFTS: ICD-10-CM

## 2024-09-12 RX ORDER — ATORVASTATIN CALCIUM 20 MG/1
20 TABLET, FILM COATED ORAL DAILY
Qty: 90 TABLET | Refills: 3 | Status: SHIPPED | OUTPATIENT
Start: 2024-09-12

## 2024-09-23 DIAGNOSIS — I10 ESSENTIAL HYPERTENSION WITH GOAL BLOOD PRESSURE LESS THAN 140/90: ICD-10-CM

## 2024-09-23 RX ORDER — VALSARTAN 80 MG/1
80 TABLET ORAL DAILY
Qty: 90 TABLET | Refills: 3 | OUTPATIENT
Start: 2024-09-23

## 2024-10-11 ENCOUNTER — MYC MEDICAL ADVICE (OUTPATIENT)
Dept: FAMILY MEDICINE | Facility: CLINIC | Age: 54
End: 2024-10-11
Payer: COMMERCIAL

## 2024-10-11 DIAGNOSIS — E66.3 OVERWEIGHT WITH BODY MASS INDEX (BMI) OF 27 TO 27.9 IN ADULT: Primary | ICD-10-CM

## 2024-10-15 ENCOUNTER — LAB (OUTPATIENT)
Dept: LAB | Facility: CLINIC | Age: 54
End: 2024-10-15
Payer: COMMERCIAL

## 2024-10-15 DIAGNOSIS — I10 ESSENTIAL HYPERTENSION WITH GOAL BLOOD PRESSURE LESS THAN 140/90: ICD-10-CM

## 2024-10-15 DIAGNOSIS — R79.89 ABNORMAL LFTS: ICD-10-CM

## 2024-10-15 LAB
ALBUMIN SERPL BCG-MCNC: 4.8 G/DL (ref 3.5–5.2)
ALP SERPL-CCNC: 84 U/L (ref 40–150)
ALT SERPL W P-5'-P-CCNC: 50 U/L (ref 0–50)
ANION GAP SERPL CALCULATED.3IONS-SCNC: 13 MMOL/L (ref 7–15)
AST SERPL W P-5'-P-CCNC: 40 U/L (ref 0–45)
BILIRUB DIRECT SERPL-MCNC: <0.2 MG/DL (ref 0–0.3)
BILIRUB SERPL-MCNC: 0.4 MG/DL
BUN SERPL-MCNC: 12.3 MG/DL (ref 6–20)
CALCIUM SERPL-MCNC: 10.4 MG/DL (ref 8.8–10.4)
CHLORIDE SERPL-SCNC: 98 MMOL/L (ref 98–107)
CREAT SERPL-MCNC: 0.76 MG/DL (ref 0.51–0.95)
EGFRCR SERPLBLD CKD-EPI 2021: >90 ML/MIN/1.73M2
GLUCOSE SERPL-MCNC: 101 MG/DL (ref 70–99)
HCO3 SERPL-SCNC: 27 MMOL/L (ref 22–29)
POTASSIUM SERPL-SCNC: 4.5 MMOL/L (ref 3.4–5.3)
PROT SERPL-MCNC: 7.7 G/DL (ref 6.4–8.3)
SODIUM SERPL-SCNC: 138 MMOL/L (ref 135–145)

## 2024-10-15 PROCEDURE — 36415 COLL VENOUS BLD VENIPUNCTURE: CPT

## 2024-10-15 PROCEDURE — 80053 COMPREHEN METABOLIC PANEL: CPT

## 2024-10-15 PROCEDURE — 82248 BILIRUBIN DIRECT: CPT

## 2024-10-16 ENCOUNTER — MYC REFILL (OUTPATIENT)
Dept: FAMILY MEDICINE | Facility: CLINIC | Age: 54
End: 2024-10-16
Payer: COMMERCIAL

## 2024-10-16 DIAGNOSIS — I10 ESSENTIAL HYPERTENSION WITH GOAL BLOOD PRESSURE LESS THAN 140/90: ICD-10-CM

## 2024-10-16 RX ORDER — VALSARTAN 160 MG/1
160 TABLET ORAL DAILY
Qty: 30 TABLET | Refills: 0 | Status: SHIPPED | OUTPATIENT
Start: 2024-10-16

## 2024-10-16 RX ORDER — VALSARTAN 160 MG/1
160 TABLET ORAL DAILY
Qty: 90 TABLET | Refills: 0 | Status: SHIPPED | OUTPATIENT
Start: 2024-10-16

## 2024-11-11 ENCOUNTER — MYC MEDICAL ADVICE (OUTPATIENT)
Dept: FAMILY MEDICINE | Facility: CLINIC | Age: 54
End: 2024-11-11
Payer: COMMERCIAL

## 2024-11-11 DIAGNOSIS — E66.3 OVERWEIGHT WITH BODY MASS INDEX (BMI) OF 27 TO 27.9 IN ADULT: Primary | ICD-10-CM

## 2024-11-14 ENCOUNTER — MYC MEDICAL ADVICE (OUTPATIENT)
Dept: FAMILY MEDICINE | Facility: CLINIC | Age: 54
End: 2024-11-14
Payer: COMMERCIAL

## 2024-11-14 NOTE — TELEPHONE ENCOUNTER
11/14/2024  9:56 AM   Vitals - Patient Reported    Systolic (Patient Reported) 114    Diastolic (Patient Reported) 84          BP Readings from Last 6 Encounters:   09/10/24 (!) 148/100   08/22/23 132/84   01/18/23 138/88   07/26/22 (!) 168/98   07/07/22 (!) 169/118   06/03/22 (!) 149/90         Jennie WALLS [facility] [place] (supporting Louise Agrawal DO)37 minutes ago (9:20 AM)     TOMAS  I took my blood pressure at home  114/84  120/82  118/83     Thanks  Jennie

## 2024-12-12 ENCOUNTER — PATIENT OUTREACH (OUTPATIENT)
Dept: CARE COORDINATION | Facility: CLINIC | Age: 54
End: 2024-12-12
Payer: COMMERCIAL

## 2025-01-15 ENCOUNTER — MYC REFILL (OUTPATIENT)
Dept: FAMILY MEDICINE | Facility: CLINIC | Age: 55
End: 2025-01-15
Payer: COMMERCIAL

## 2025-01-15 DIAGNOSIS — I10 ESSENTIAL HYPERTENSION WITH GOAL BLOOD PRESSURE LESS THAN 140/90: ICD-10-CM

## 2025-01-16 RX ORDER — VALSARTAN 160 MG/1
160 TABLET ORAL DAILY
Qty: 90 TABLET | Refills: 0 | Status: SHIPPED | OUTPATIENT
Start: 2025-01-16

## 2025-01-16 RX ORDER — VALSARTAN 160 MG/1
160 TABLET ORAL DAILY
Qty: 90 TABLET | Refills: 0 | OUTPATIENT
Start: 2025-01-16

## 2025-01-28 ENCOUNTER — MYC MEDICAL ADVICE (OUTPATIENT)
Dept: FAMILY MEDICINE | Facility: CLINIC | Age: 55
End: 2025-01-28
Payer: COMMERCIAL

## 2025-01-29 ENCOUNTER — TELEPHONE (OUTPATIENT)
Dept: FAMILY MEDICINE | Facility: CLINIC | Age: 55
End: 2025-01-29
Payer: COMMERCIAL

## 2025-01-29 NOTE — TELEPHONE ENCOUNTER
PLAN LIMIT        [facility]  Fax:[phone]  Spec: [phone]  MO: [phone]

## 2025-01-29 NOTE — TELEPHONE ENCOUNTER
I would recommend she be seen in person for eval in the next day or 2.  Not sure what is going on but hard to tell in the photos and should get checked out.    Clinic visit would be fine if there is a spot for her.  Looks like my schedule is full tomorrow and Friday for in person    Dr. Louise Agrawal, DO

## 2025-01-30 ENCOUNTER — OFFICE VISIT (OUTPATIENT)
Dept: FAMILY MEDICINE | Facility: CLINIC | Age: 55
End: 2025-01-30
Payer: COMMERCIAL

## 2025-01-30 VITALS
WEIGHT: 152 LBS | OXYGEN SATURATION: 98 % | BODY MASS INDEX: 25.95 KG/M2 | DIASTOLIC BLOOD PRESSURE: 70 MMHG | TEMPERATURE: 99.2 F | HEIGHT: 64 IN | HEART RATE: 102 BPM | SYSTOLIC BLOOD PRESSURE: 122 MMHG

## 2025-01-30 DIAGNOSIS — I73.00 RAYNAUD'S PHENOMENON WITHOUT GANGRENE: ICD-10-CM

## 2025-01-30 DIAGNOSIS — I73.00 RAYNAUD'S PHENOMENON WITHOUT GANGRENE: Primary | ICD-10-CM

## 2025-01-30 PROCEDURE — 99213 OFFICE O/P EST LOW 20 MIN: CPT | Performed by: FAMILY MEDICINE

## 2025-01-30 RX ORDER — AMLODIPINE BESYLATE 2.5 MG/1
2.5 TABLET ORAL DAILY
Qty: 90 TABLET | Refills: 0 | Status: SHIPPED | OUTPATIENT
Start: 2025-01-30

## 2025-01-30 RX ORDER — AMLODIPINE BESYLATE 2.5 MG/1
2.5 TABLET ORAL DAILY
Qty: 30 TABLET | Refills: 2 | Status: SHIPPED | OUTPATIENT
Start: 2025-01-30 | End: 2025-01-30

## 2025-01-30 NOTE — PROGRESS NOTES
"  {PROVIDER CHARTING PREFERENCE:165164}    Kelley Schneider is a 54 year old, presenting for the following health issues:  Toe Pain (Bruising on toe both feet, getting hard to walk on. Hard to wear shoes)        1/30/2025     9:47 AM   Additional Questions   Roomed by Jeanette HARDY CMA     History of Present Illness       Reason for visit:  Bruising toes  Symptom onset:  3-4 weeks ago   She is taking medications regularly.     Would like to stop the atorvastatin - having nausea she does want to stop   Has had 3 weeks of bruised toes   The 10-year ASCVD risk score (Clark BERRY, et al., 2019) is: 5.4%    Values used to calculate the score:      Age: 54 years      Sex: Female      Is Non- : No      Diabetic: No      Tobacco smoker: No      Systolic Blood Pressure: 122 mmHg      Is BP treated: Yes      HDL Cholesterol: 39 mg/dL      Total Cholesterol: 301 mg/dL          Review of Systems  Constitutional, HEENT, cardiovascular, pulmonary, gi and gu systems are negative, except as otherwise noted.      Objective    /70 (BP Location: Right arm, Patient Position: Sitting, Cuff Size: Adult Small)   Pulse 102   Temp 99.2  F (37.3  C) (Tympanic)   Ht 1.626 m (5' 4\")   Wt 68.9 kg (152 lb)   SpO2 98%   BMI 26.09 kg/m    Body mass index is 26.09 kg/m .  Physical Exam   GENERAL: alert and no distress  SKIN: {:851994}  PSYCH: mentation appears normal, affect normal/bright    {Diagnostic Test Results (Optional):274241}        Signed Electronically by: Nevaeh Goff MD  {Email feedback regarding this note to [email or web address]   :[id number]}  " There are multiple violaceous lesions on the ends of the third and fourth toes.  These are not painful there is no skin sloughing off pulses are good around the ankle.  PSYCH: mentation appears normal, affect normal/bright    No results found for any visits on 01/30/25.        Signed Electronically by: Nevaeh Goff MD

## 2025-02-02 NOTE — TELEPHONE ENCOUNTER
PA Initiation    Medication: WEGOVY 1 MG/0.5ML SC SOAJ  Insurance Company: [employer] Non-Specialty PA's - Phone [phone] Fax [phone]  Pharmacy Filling the Rx: [facility] - [place], [place] - [address]  Filling Pharmacy Phone: [phone]  Filling Pharmacy Fax: [phone]  Start Date: 2/2/2025

## 2025-02-03 NOTE — TELEPHONE ENCOUNTER
Prior Authorization Not Needed per Insurance    Medication: WEGOVY 1 MG/0.5ML SC SOAJ  Insurance Company: [employer] Non-Specialty PA's - Phone [phone] Fax [phone]  Expected CoPay: $    Pharmacy Filling the Rx: [facility] - [place], [place] - [address]   Pharmacy Notified: YES  Patient Notified: **Instructed pharmacy to notify patient when script is ready to /ship.**    Per insurance rep no PA is needed.  When she runs the claim it doesn't reject for quantity.  I also ran a claim in [employer] and it does pay now.

## 2025-02-07 ENCOUNTER — MYC MEDICAL ADVICE (OUTPATIENT)
Dept: FAMILY MEDICINE | Facility: CLINIC | Age: 55
End: 2025-02-07
Payer: COMMERCIAL

## 2025-02-13 ENCOUNTER — OFFICE VISIT (OUTPATIENT)
Dept: FAMILY MEDICINE | Facility: CLINIC | Age: 55
End: 2025-02-13
Payer: COMMERCIAL

## 2025-02-13 ENCOUNTER — MYC MEDICAL ADVICE (OUTPATIENT)
Dept: FAMILY MEDICINE | Facility: CLINIC | Age: 55
End: 2025-02-13

## 2025-02-13 VITALS
HEIGHT: 64 IN | WEIGHT: 155 LBS | SYSTOLIC BLOOD PRESSURE: 138 MMHG | OXYGEN SATURATION: 98 % | TEMPERATURE: 99.6 F | HEART RATE: 123 BPM | DIASTOLIC BLOOD PRESSURE: 70 MMHG | BODY MASS INDEX: 26.46 KG/M2

## 2025-02-13 DIAGNOSIS — Z12.31 VISIT FOR SCREENING MAMMOGRAM: ICD-10-CM

## 2025-02-13 DIAGNOSIS — I10 ESSENTIAL HYPERTENSION WITH GOAL BLOOD PRESSURE LESS THAN 140/90: ICD-10-CM

## 2025-02-13 DIAGNOSIS — R73.09 ABNORMAL GLUCOSE: ICD-10-CM

## 2025-02-13 DIAGNOSIS — E66.3 OVERWEIGHT WITH BODY MASS INDEX (BMI) OF 27 TO 27.9 IN ADULT: ICD-10-CM

## 2025-02-13 DIAGNOSIS — I10 ESSENTIAL HYPERTENSION WITH GOAL BLOOD PRESSURE LESS THAN 140/90: Primary | ICD-10-CM

## 2025-02-13 DIAGNOSIS — I73.00 RAYNAUD'S PHENOMENON WITHOUT GANGRENE: ICD-10-CM

## 2025-02-13 DIAGNOSIS — E66.3 OVERWEIGHT WITH BODY MASS INDEX (BMI) OF 27 TO 27.9 IN ADULT: Primary | ICD-10-CM

## 2025-02-13 LAB
ERYTHROCYTE [DISTWIDTH] IN BLOOD BY AUTOMATED COUNT: 13.2 % (ref 10–15)
EST. AVERAGE GLUCOSE BLD GHB EST-MCNC: 128 MG/DL
HBA1C MFR BLD: 6.1 % (ref 0–5.6)
HCT VFR BLD AUTO: 39.8 % (ref 35–47)
HGB BLD-MCNC: 13.4 G/DL (ref 11.7–15.7)
MCH RBC QN AUTO: 30 PG (ref 26.5–33)
MCHC RBC AUTO-ENTMCNC: 33.7 G/DL (ref 31.5–36.5)
MCV RBC AUTO: 89 FL (ref 78–100)
PLATELET # BLD AUTO: 418 10E3/UL (ref 150–450)
RBC # BLD AUTO: 4.46 10E6/UL (ref 3.8–5.2)
TSH SERPL DL<=0.005 MIU/L-ACNC: 1.34 UIU/ML (ref 0.3–4.2)
WBC # BLD AUTO: 9 10E3/UL (ref 4–11)

## 2025-02-13 NOTE — PROGRESS NOTES
ICD-10-CM    1. Essential hypertension with goal blood pressure less than 140/90  I10       2. Raynaud's phenomenon without gangrene  I73.00       3. Overweight with body mass index (BMI) of 27 to 27.9 in adult  E66.3 Vitamin B12    Z68.27 TSH with free T4 reflex     CBC with platelets     Vitamin B12     TSH with free T4 reflex     CBC with platelets      4. Abnormal glucose  R73.09 Hemoglobin A1c     Hemoglobin A1c      5. Visit for screening mammogram  Z12.31 MA Screen Bilateral w/Dillan        HTN:  controlled today, continue meds    Raynaud's:  healing ischemic lesions noted.  Encouraged pt to continue amlodipine and warming.  Reviewed additional doses available.  She will reach out if she feels anything is changing/worsening or if lesions do not continue to heal.    Overweight with obesity related comorbidity of HTN:  labs as ordered.  Continue semaglutide if available.    Kelley Schneider is a 54 year old, presenting for the following health issues:  Hypertension        2/13/2025     1:41 PM   Additional Questions   Roomed by Lni FERRELL   Accompanied by Self      History of Present Illness       Hypertension: She presents for follow up of hypertension.  She does check blood pressure  regularly outside of the clinic. Outpatient blood pressures have not been over 140/90. She does not follow a low salt diet.     She eats 2-3 servings of fruits and vegetables daily.She consumes 0 sweetened beverage(s) daily.She exercises with enough effort to increase her heart rate 9 or less minutes per day.  She exercises with enough effort to increase her heart rate 3 or less days per week.   She is taking medications regularly.     BP has been controlled at home on regular readings on validated cuff.  No trouble with blood pressure medications.    Thinking about getting semaglutide through a different pharmacy/program as she is not sure if her insurance covers any longer.  Additional labs ordered.  She will contact pharmacy  "as well as it appears med should be covered based on last messages.  Has been out of med for 3 weeks currently.     Recheck feet.  Was seen end of Jan by Dr Goff.  Had painful bruised toes.  Noted symptoms beginning earlier in Jan.  Feet were very cold and turned white.  Started getting sores/deep painful bruises on her toes.  Mother with a h/o scleroderma/Raynauds and she recognized the color changes in her feet.  Was seen in clinic and started on amlodipine.  Has been tolerating med well and feels thing are dramatically improved.  Has also been focusing on keeping her feet warm with thicker socks and slippers.  Pain is much improved, no longer seeing the white color change.  Feels bruising and lesions look much better and are healing well.     Review of Systems  Constitutional, HEENT, cardiovascular, pulmonary, gi and gu systems are negative, except as otherwise noted.      Objective    /70   Pulse (!) 123   Temp 99.6  F (37.6  C) (Tympanic)   Ht 1.626 m (5' 4\")   Wt 70.3 kg (155 lb)   SpO2 98%   BMI 26.61 kg/m    Body mass index is 26.61 kg/m .  Physical Exam   GENERAL: alert and no distress  RESP: lungs clear to auscultation - no rales, rhonchi or wheezes  CV: regular rate and rhythm, normal S1 S2, no S3 or S4, no murmur, click or rub, no peripheral edema  MS: no gross musculoskeletal defects noted, no edema  SKIN: irish feet with healing ecchymosis on toes and healing ischemic lesion along nail beds multiple toes.  Open/denuded area between 3rd and 4th digit R, no signs of infection  PSYCH: mentation appears normal, affect normal/bright    Epic reviewed        Signed Electronically by: Louise Agrawal DO    "

## 2025-02-14 ENCOUNTER — TELEPHONE (OUTPATIENT)
Dept: FAMILY MEDICINE | Facility: CLINIC | Age: 55
End: 2025-02-14

## 2025-02-14 NOTE — TELEPHONE ENCOUNTER
Retail Pharmacy Prior Authorization Team   Phone: [phone]    PRIOR AUTHORIZATION DENIED    Medication: OZEMPIC (1 MG/DOSE) 4 MG/3ML SC SOPN  Insurance Company: [employer] Non-Specialty PA's - Phone [phone] Fax [phone]  Denial Date: 2/14/2025  Denial Reason(s): MUST HAVE TYPE 2 DIABETES AND PROVIDE DOCUMENTATION CONFIRMING DX      Appeal Information: IF THE PROVIDER WOULD LIKE TO APPEAL THIS DECISION PLEASE PROVIDE THE PA TEAM WITH A LETTER OF MEDICAL NECESSITY      Patient Notified: NO

## 2025-02-18 DIAGNOSIS — E66.3 OVERWEIGHT WITH BODY MASS INDEX (BMI) OF 27 TO 27.9 IN ADULT: Primary | ICD-10-CM

## 2025-02-18 DIAGNOSIS — E66.3 OVERWEIGHT (BMI 25.0-29.9): ICD-10-CM

## 2025-02-18 NOTE — TELEPHONE ENCOUNTER
Pt is requesting new rx. Last one was approved but ozempic was sent instead. Please send new rx for this med. Thank you!!

## 2025-03-18 ENCOUNTER — MYC MEDICAL ADVICE (OUTPATIENT)
Dept: FAMILY MEDICINE | Facility: CLINIC | Age: 55
End: 2025-03-18
Payer: COMMERCIAL

## 2025-03-18 ENCOUNTER — MYC REFILL (OUTPATIENT)
Dept: FAMILY MEDICINE | Facility: CLINIC | Age: 55
End: 2025-03-18
Payer: COMMERCIAL

## 2025-03-18 DIAGNOSIS — F41.1 GENERALIZED ANXIETY DISORDER: ICD-10-CM

## 2025-03-18 DIAGNOSIS — I73.00 RAYNAUD'S PHENOMENON WITHOUT GANGRENE: Primary | ICD-10-CM

## 2025-03-19 RX ORDER — ALPRAZOLAM 1 MG/1
TABLET ORAL
Qty: 20 TABLET | Refills: 0 | Status: SHIPPED | OUTPATIENT
Start: 2025-03-19

## 2025-03-19 RX ORDER — AMLODIPINE BESYLATE 5 MG/1
5 TABLET ORAL DAILY
Qty: 90 TABLET | Refills: 1 | Status: SHIPPED | OUTPATIENT
Start: 2025-03-19

## 2025-04-14 ENCOUNTER — MYC REFILL (OUTPATIENT)
Dept: FAMILY MEDICINE | Facility: CLINIC | Age: 55
End: 2025-04-14
Payer: COMMERCIAL

## 2025-04-14 DIAGNOSIS — I10 ESSENTIAL HYPERTENSION WITH GOAL BLOOD PRESSURE LESS THAN 140/90: ICD-10-CM

## 2025-04-15 ENCOUNTER — MYC MEDICAL ADVICE (OUTPATIENT)
Dept: FAMILY MEDICINE | Facility: CLINIC | Age: 55
End: 2025-04-15
Payer: COMMERCIAL

## 2025-04-15 DIAGNOSIS — E66.3 OVERWEIGHT (BMI 25.0-29.9): Primary | ICD-10-CM

## 2025-04-15 RX ORDER — VALSARTAN 160 MG/1
160 TABLET ORAL DAILY
Qty: 90 TABLET | Refills: 1 | Status: SHIPPED | OUTPATIENT
Start: 2025-04-15

## 2025-04-21 ENCOUNTER — TELEPHONE (OUTPATIENT)
Dept: FAMILY MEDICINE | Facility: CLINIC | Age: 55
End: 2025-04-21
Payer: COMMERCIAL

## 2025-04-21 NOTE — TELEPHONE ENCOUNTER
[facility]  Fax:[phone]  Spec: [phone]  MO: [phone]

## 2025-04-22 ENCOUNTER — MYC MEDICAL ADVICE (OUTPATIENT)
Dept: FAMILY MEDICINE | Facility: CLINIC | Age: 55
End: 2025-04-22

## 2025-04-23 NOTE — TELEPHONE ENCOUNTER
Retail Pharmacy Prior Authorization Team   Phone: [phone]    Prior Authorization Approval    Medication: WEGOVY 1.7 MG/0.75ML SC SOAJ  Authorization Effective Date: 3/24/2025  Authorization Expiration Date: 4/23/2026  Insurance Company: [employer] Non-Specialty PA's - Phone [phone] Fax [phone]  Which Pharmacy is filling the prescription: [facility] - [place] [address]   Pharmacy Notified: YES  Patient Notified: YES (faxed approval letter to pharmacy and notified patient via [email or web address] message)

## 2025-05-19 ENCOUNTER — MYC REFILL (OUTPATIENT)
Dept: FAMILY MEDICINE | Facility: CLINIC | Age: 55
End: 2025-05-19
Payer: COMMERCIAL

## 2025-05-19 DIAGNOSIS — E66.3 OVERWEIGHT (BMI 25.0-29.9): ICD-10-CM

## 2025-05-20 NOTE — TELEPHONE ENCOUNTER
Requested Prescriptions   Pending Prescriptions Disp Refills    Semaglutide-Weight Management (WEGOVY) 1.7 MG/0.75ML pen 3 mL 0     Sig: Inject 1.7 mg subcutaneously once a week.       GLP-1 Agonists Protocol Failed - 5/20/2025 10:29 AM        Failed - Medication indicated for associated diagnosis     Medication is associated with one or more of the following diagnoses:    Type 2 diabetes mellitus  Obesity          Passed - Medication is active on med list and the sig matches. RN to manually verify dose and sig if red X/fail.     If the protocol passes (green check), you do not need to verify med dose and sig.    A prescription matches if they are the same clinical intention.    For Example: once daily and every morning are the same.    The protocol can not identify upper and lower case letters as matching and will fail.     For Example: Take 1 tablet (50 mg) by mouth daily     TAKE 1 TABLET (50 MG) BY MOUTH DAILY    For all fails (red x), verify dose and sig.    If the refill does match what is on file, the RN can still proceed to approve the refill request.       If they do not match, route to the appropriate provider.             Passed - Has GFR on file in past 12 months and most recent value is normal        Passed - Recent (6 mo) or future (90 days) visit within the authorizing provider's specialty     The patient must have completed an in-person or virtual visit within the past 6 months or has a future visit scheduled within the next 90 days with the authorizing provider s specialty.  Urgent care and e-visits do not quality as an office visit for this protocol.          Passed - Patient is age 18 or older        Passed - No active pregnancy on record        Passed - No positive pregnancy test in past 12 months

## 2025-06-12 ENCOUNTER — MYC REFILL (OUTPATIENT)
Dept: FAMILY MEDICINE | Facility: CLINIC | Age: 55
End: 2025-06-12
Payer: COMMERCIAL

## 2025-06-12 DIAGNOSIS — N95.2 ATROPHIC VAGINITIS: ICD-10-CM

## 2025-06-12 RX ORDER — ESTRADIOL 10 UG/1
10 TABLET, FILM COATED VAGINAL
Qty: 25 TABLET | Refills: 1 | Status: SHIPPED | OUTPATIENT
Start: 2025-06-12

## 2025-06-15 ENCOUNTER — HEALTH MAINTENANCE LETTER (OUTPATIENT)
Age: 55
End: 2025-06-15

## 2025-06-18 ENCOUNTER — MYC REFILL (OUTPATIENT)
Dept: FAMILY MEDICINE | Facility: CLINIC | Age: 55
End: 2025-06-18
Payer: COMMERCIAL

## 2025-06-18 DIAGNOSIS — E66.3 OVERWEIGHT (BMI 25.0-29.9): ICD-10-CM

## 2025-07-23 ENCOUNTER — MYC MEDICAL ADVICE (OUTPATIENT)
Dept: FAMILY MEDICINE | Facility: CLINIC | Age: 55
End: 2025-07-23
Payer: COMMERCIAL

## 2025-07-23 ENCOUNTER — MYC REFILL (OUTPATIENT)
Dept: FAMILY MEDICINE | Facility: CLINIC | Age: 55
End: 2025-07-23
Payer: COMMERCIAL

## 2025-07-23 DIAGNOSIS — E66.3 OVERWEIGHT (BMI 25.0-29.9): ICD-10-CM

## 2025-07-23 NOTE — TELEPHONE ENCOUNTER
Has appointment scheduled for 8/18/25.      Requested Prescriptions   Pending Prescriptions Disp Refills    Semaglutide-Weight Management (WEGOVY) 1.7 MG/0.75ML pen 3 mL 0     Sig: Inject 1.7 mg subcutaneously once a week.       GLP-1 Agonists Protocol Failed - 7/23/2025 11:04 AM        Failed - Diagnosis is not related to diabetes mellitus. Send to Provider.        Failed - Medication indicated for associated diagnosis     Medication is associated with one or more of the following diagnoses:    Type 2 diabetes mellitus  Obesity          Passed - Medication is active on med list and the sig matches. RN to manually verify dose and sig if red X/fail.     If the protocol passes (green check), you do not need to verify med dose and sig.    A prescription matches if they are the same clinical intention.    For Example: once daily and every morning are the same.    The protocol can not identify upper and lower case letters as matching and will fail.     For Example: Take 1 tablet (50 mg) by mouth daily     TAKE 1 TABLET (50 MG) BY MOUTH DAILY    For all fails (red x), verify dose and sig.    If the refill does match what is on file, the RN can still proceed to approve the refill request.       If they do not match, route to the appropriate provider.             Passed - Has GFR on file in past 12 months and most recent value is normal     Recent Labs   Lab Test 10/15/24  1132 01/11/22  1452 12/15/20  1411   GFRESTIMATED >90   < > 83   GFRESTBLACK  --   --  >90    < > = values in this interval not displayed.             Passed - Recent (6 month) or future (90 days) visit with the authorizing provider's specialty (provided they have been seen in the past 9 months)     The patient must have completed an in-person or virtual visit within the past 6 months or has a future visit scheduled within the next 90 days with the authorizing provider s specialty.  Urgent care and e-visits do not quality as an office visit for this  protocol.          Passed - Patient is age 18 or older        Passed - No active pregnancy on record        Passed - No positive pregnancy test in past 12 months

## 2025-07-30 ENCOUNTER — MYC REFILL (OUTPATIENT)
Dept: FAMILY MEDICINE | Facility: CLINIC | Age: 55
End: 2025-07-30
Payer: COMMERCIAL

## 2025-07-30 DIAGNOSIS — F41.1 GENERALIZED ANXIETY DISORDER: ICD-10-CM

## 2025-07-30 RX ORDER — ALPRAZOLAM 1 MG/1
TABLET ORAL
Qty: 20 TABLET | Refills: 0 | Status: SHIPPED | OUTPATIENT
Start: 2025-07-30

## 2025-08-18 ENCOUNTER — OFFICE VISIT (OUTPATIENT)
Dept: FAMILY MEDICINE | Facility: CLINIC | Age: 55
End: 2025-08-18
Payer: COMMERCIAL

## 2025-08-18 VITALS
HEIGHT: 64 IN | RESPIRATION RATE: 16 BRPM | OXYGEN SATURATION: 96 % | TEMPERATURE: 99.2 F | SYSTOLIC BLOOD PRESSURE: 122 MMHG | HEART RATE: 106 BPM | WEIGHT: 145.6 LBS | DIASTOLIC BLOOD PRESSURE: 70 MMHG | BODY MASS INDEX: 24.86 KG/M2

## 2025-08-18 DIAGNOSIS — E78.5 HYPERLIPIDEMIA LDL GOAL <100: ICD-10-CM

## 2025-08-18 DIAGNOSIS — I73.00 RAYNAUD'S PHENOMENON WITHOUT GANGRENE: ICD-10-CM

## 2025-08-18 DIAGNOSIS — R73.09 ABNORMAL GLUCOSE: ICD-10-CM

## 2025-08-18 DIAGNOSIS — I10 ESSENTIAL HYPERTENSION WITH GOAL BLOOD PRESSURE LESS THAN 140/90: Primary | ICD-10-CM

## 2025-08-18 DIAGNOSIS — F41.1 GENERALIZED ANXIETY DISORDER: ICD-10-CM

## 2025-08-18 DIAGNOSIS — Z12.31 VISIT FOR SCREENING MAMMOGRAM: ICD-10-CM

## 2025-08-18 DIAGNOSIS — E66.3 OVERWEIGHT (BMI 25.0-29.9): ICD-10-CM

## 2025-08-18 DIAGNOSIS — B07.8 OTHER VIRAL WARTS: ICD-10-CM

## 2025-08-18 PROCEDURE — 3074F SYST BP LT 130 MM HG: CPT | Performed by: FAMILY MEDICINE

## 2025-08-18 PROCEDURE — 96127 BRIEF EMOTIONAL/BEHAV ASSMT: CPT | Performed by: FAMILY MEDICINE

## 2025-08-18 PROCEDURE — 3078F DIAST BP <80 MM HG: CPT | Performed by: FAMILY MEDICINE

## 2025-08-18 PROCEDURE — 17110 DESTRUCTION B9 LES UP TO 14: CPT | Performed by: FAMILY MEDICINE

## 2025-08-18 PROCEDURE — 90750 HZV VACC RECOMBINANT IM: CPT | Performed by: FAMILY MEDICINE

## 2025-08-18 PROCEDURE — 99214 OFFICE O/P EST MOD 30 MIN: CPT | Mod: 25 | Performed by: FAMILY MEDICINE

## 2025-08-18 PROCEDURE — 90471 IMMUNIZATION ADMIN: CPT | Performed by: FAMILY MEDICINE

## 2025-08-18 RX ORDER — VALSARTAN 80 MG/1
80 TABLET ORAL DAILY
Qty: 90 TABLET | Refills: 3 | Status: SHIPPED | OUTPATIENT
Start: 2025-08-18

## 2025-08-18 RX ORDER — AMLODIPINE BESYLATE 5 MG/1
5 TABLET ORAL DAILY
Qty: 90 TABLET | Refills: 3 | Status: SHIPPED | OUTPATIENT
Start: 2025-08-18

## 2025-08-18 RX ORDER — VENLAFAXINE HYDROCHLORIDE 150 MG/1
150 CAPSULE, EXTENDED RELEASE ORAL DAILY
Qty: 90 CAPSULE | Refills: 3 | Status: SHIPPED | OUTPATIENT
Start: 2025-08-18

## 2025-08-18 RX ORDER — HYDROCHLOROTHIAZIDE 25 MG/1
25 TABLET ORAL DAILY
Qty: 90 TABLET | Refills: 3 | Status: SHIPPED | OUTPATIENT
Start: 2025-08-18

## 2025-08-19 ENCOUNTER — PATIENT OUTREACH (OUTPATIENT)
Dept: CARE COORDINATION | Facility: CLINIC | Age: 55
End: 2025-08-19
Payer: COMMERCIAL

## 2025-08-27 ENCOUNTER — MYC MEDICAL ADVICE (OUTPATIENT)
Dept: FAMILY MEDICINE | Facility: CLINIC | Age: 55
End: 2025-08-27
Payer: COMMERCIAL